# Patient Record
Sex: MALE | Race: OTHER | NOT HISPANIC OR LATINO
[De-identification: names, ages, dates, MRNs, and addresses within clinical notes are randomized per-mention and may not be internally consistent; named-entity substitution may affect disease eponyms.]

---

## 2017-01-02 ENCOUNTER — RESULT REVIEW (OUTPATIENT)
Age: 82
End: 2017-01-02

## 2017-01-03 ENCOUNTER — APPOINTMENT (OUTPATIENT)
Dept: INFUSION THERAPY | Facility: HOSPITAL | Age: 82
End: 2017-01-03

## 2017-01-03 ENCOUNTER — OUTPATIENT (OUTPATIENT)
Dept: OUTPATIENT SERVICES | Facility: HOSPITAL | Age: 82
LOS: 1 days | Discharge: HOME | End: 2017-01-03

## 2017-01-03 ENCOUNTER — OUTPATIENT (OUTPATIENT)
Dept: OUTPATIENT SERVICES | Facility: HOSPITAL | Age: 82
LOS: 1 days | End: 2017-01-03
Payer: MEDICARE

## 2017-01-03 DIAGNOSIS — C90.00 MULTIPLE MYELOMA NOT HAVING ACHIEVED REMISSION: ICD-10-CM

## 2017-01-03 LAB
BASOPHILS # BLD AUTO: 0 K/UL — SIGNIFICANT CHANGE UP (ref 0–0.2)
BLD GP AB SCN SERPL QL: NEGATIVE — SIGNIFICANT CHANGE UP
EOSINOPHIL # BLD AUTO: 0.1 K/UL — SIGNIFICANT CHANGE UP (ref 0–0.5)
EOSINOPHIL NFR BLD AUTO: 2 % — SIGNIFICANT CHANGE UP (ref 0–6)
HCT VFR BLD CALC: 21 % — CRITICAL LOW (ref 39–50)
HGB BLD-MCNC: 7.2 G/DL — LOW (ref 13–17)
LYMPHOCYTES # BLD AUTO: 0.6 K/UL — LOW (ref 1–3.3)
LYMPHOCYTES # BLD AUTO: 76 % — HIGH (ref 13–44)
MCHC RBC-ENTMCNC: 29.8 PG — SIGNIFICANT CHANGE UP (ref 27–34)
MCHC RBC-ENTMCNC: 34.6 GM/DL — SIGNIFICANT CHANGE UP (ref 32–36)
MCV RBC AUTO: 86.1 FL — SIGNIFICANT CHANGE UP (ref 80–100)
MONOCYTES # BLD AUTO: 0 K/UL — SIGNIFICANT CHANGE UP (ref 0–0.9)
MONOCYTES NFR BLD AUTO: 2 % — SIGNIFICANT CHANGE UP (ref 2–14)
NEUTROPHILS # BLD AUTO: 0.1 K/UL — LOW (ref 1.8–7.4)
NEUTROPHILS NFR BLD AUTO: 18 % — LOW (ref 43–77)
NEUTS BAND # BLD: 2 % — SIGNIFICANT CHANGE UP (ref 0–8)
PLAT MORPH BLD: NORMAL — SIGNIFICANT CHANGE UP
PLATELET # BLD AUTO: 6 K/UL — CRITICAL LOW (ref 150–400)
PLATELET # BLD MANUAL: 28 K/UL — LOW (ref 150–400)
RBC # BLD: 2.44 M/UL — LOW (ref 4.2–5.8)
RBC # FLD: 12 % — SIGNIFICANT CHANGE UP (ref 10.3–14.5)
RBC BLD AUTO: SIGNIFICANT CHANGE UP
RH IG SCN BLD-IMP: POSITIVE — SIGNIFICANT CHANGE UP
WBC # BLD: 0.8 K/UL — CRITICAL LOW (ref 3.8–10.5)
WBC # FLD AUTO: 0.8 K/UL — CRITICAL LOW (ref 3.8–10.5)

## 2017-01-04 ENCOUNTER — RESULT REVIEW (OUTPATIENT)
Age: 82
End: 2017-01-04

## 2017-01-04 DIAGNOSIS — Z51.89 ENCOUNTER FOR OTHER SPECIFIED AFTERCARE: ICD-10-CM

## 2017-01-04 DIAGNOSIS — R11.2 NAUSEA WITH VOMITING, UNSPECIFIED: ICD-10-CM

## 2017-01-05 ENCOUNTER — APPOINTMENT (OUTPATIENT)
Dept: INFUSION THERAPY | Facility: HOSPITAL | Age: 82
End: 2017-01-05

## 2017-01-05 LAB
BASOPHILS # BLD AUTO: 0 K/UL — SIGNIFICANT CHANGE UP (ref 0–0.2)
EOSINOPHIL # BLD AUTO: 0 K/UL — SIGNIFICANT CHANGE UP (ref 0–0.5)
EOSINOPHIL NFR BLD AUTO: 2 % — SIGNIFICANT CHANGE UP (ref 0–6)
HCT VFR BLD CALC: 21 % — CRITICAL LOW (ref 39–50)
HGB BLD-MCNC: 7.1 G/DL — LOW (ref 13–17)
LYMPHOCYTES # BLD AUTO: 0.6 K/UL — LOW (ref 1–3.3)
LYMPHOCYTES # BLD AUTO: 82 % — HIGH (ref 13–44)
MCHC RBC-ENTMCNC: 29.1 PG — SIGNIFICANT CHANGE UP (ref 27–34)
MCHC RBC-ENTMCNC: 33.8 GM/DL — SIGNIFICANT CHANGE UP (ref 32–36)
MCV RBC AUTO: 86.2 FL — SIGNIFICANT CHANGE UP (ref 80–100)
MONOCYTES # BLD AUTO: 0 K/UL — SIGNIFICANT CHANGE UP (ref 0–0.9)
NEUTROPHILS # BLD AUTO: 0.2 K/UL — LOW (ref 1.8–7.4)
NEUTROPHILS NFR BLD AUTO: 16 % — LOW (ref 43–77)
PLAT MORPH BLD: NORMAL — SIGNIFICANT CHANGE UP
PLATELET # BLD AUTO: 14 K/UL — CRITICAL LOW (ref 150–400)
RBC # BLD: 2.43 M/UL — LOW (ref 4.2–5.8)
RBC # FLD: 12 % — SIGNIFICANT CHANGE UP (ref 10.3–14.5)
RBC BLD AUTO: SIGNIFICANT CHANGE UP
WBC # BLD: 0.8 K/UL — CRITICAL LOW (ref 3.8–10.5)
WBC # FLD AUTO: 0.8 K/UL — CRITICAL LOW (ref 3.8–10.5)

## 2017-01-06 ENCOUNTER — RESULT REVIEW (OUTPATIENT)
Age: 82
End: 2017-01-06

## 2017-01-07 ENCOUNTER — APPOINTMENT (OUTPATIENT)
Dept: INFUSION THERAPY | Facility: HOSPITAL | Age: 82
End: 2017-01-07

## 2017-01-07 LAB
BASOPHILS # BLD AUTO: 0 K/UL — SIGNIFICANT CHANGE UP (ref 0–0.2)
BLD GP AB SCN SERPL QL: NEGATIVE — SIGNIFICANT CHANGE UP
EOSINOPHIL # BLD AUTO: 0 K/UL — SIGNIFICANT CHANGE UP (ref 0–0.5)
EOSINOPHIL NFR BLD AUTO: 1 % — SIGNIFICANT CHANGE UP (ref 0–6)
HCT VFR BLD CALC: 24.3 % — LOW (ref 39–50)
HGB BLD-MCNC: 8.3 G/DL — LOW (ref 13–17)
LYMPHOCYTES # BLD AUTO: 0.7 K/UL — LOW (ref 1–3.3)
LYMPHOCYTES # BLD AUTO: 73 % — HIGH (ref 13–44)
MCHC RBC-ENTMCNC: 29.5 PG — SIGNIFICANT CHANGE UP (ref 27–34)
MCHC RBC-ENTMCNC: 34 GM/DL — SIGNIFICANT CHANGE UP (ref 32–36)
MCV RBC AUTO: 86.7 FL — SIGNIFICANT CHANGE UP (ref 80–100)
MONOCYTES # BLD AUTO: 0 K/UL — SIGNIFICANT CHANGE UP (ref 0–0.9)
MONOCYTES NFR BLD AUTO: 3 % — SIGNIFICANT CHANGE UP (ref 2–14)
NEUTROPHILS # BLD AUTO: 0.2 K/UL — LOW (ref 1.8–7.4)
NEUTROPHILS NFR BLD AUTO: 23 % — LOW (ref 43–77)
PLAT MORPH BLD: NORMAL — SIGNIFICANT CHANGE UP
PLATELET # BLD AUTO: 7 K/UL — CRITICAL LOW (ref 150–400)
PLATELET # BLD MANUAL: 18 K/UL — CRITICAL LOW (ref 150–400)
RBC # BLD: 2.81 M/UL — LOW (ref 4.2–5.8)
RBC # FLD: 12.2 % — SIGNIFICANT CHANGE UP (ref 10.3–14.5)
RBC BLD AUTO: SIGNIFICANT CHANGE UP
RH IG SCN BLD-IMP: POSITIVE — SIGNIFICANT CHANGE UP
WBC # BLD: 0.9 K/UL — CRITICAL LOW (ref 3.8–10.5)
WBC # FLD AUTO: 0.9 K/UL — CRITICAL LOW (ref 3.8–10.5)

## 2017-01-08 ENCOUNTER — RESULT REVIEW (OUTPATIENT)
Age: 82
End: 2017-01-08

## 2017-01-09 ENCOUNTER — APPOINTMENT (OUTPATIENT)
Dept: INFUSION THERAPY | Facility: HOSPITAL | Age: 82
End: 2017-01-09

## 2017-01-09 LAB
EOSINOPHIL # BLD AUTO: 0.1 K/UL — SIGNIFICANT CHANGE UP (ref 0–0.5)
EOSINOPHIL NFR BLD AUTO: 1 % — SIGNIFICANT CHANGE UP (ref 0–6)
HCT VFR BLD CALC: 23.6 % — LOW (ref 39–50)
HGB BLD-MCNC: 8.3 G/DL — LOW (ref 13–17)
LYMPHOCYTES # BLD AUTO: 0.7 K/UL — LOW (ref 1–3.3)
LYMPHOCYTES # BLD AUTO: 67 % — HIGH (ref 13–44)
MCHC RBC-ENTMCNC: 30.3 PG — SIGNIFICANT CHANGE UP (ref 27–34)
MCHC RBC-ENTMCNC: 35 GM/DL — SIGNIFICANT CHANGE UP (ref 32–36)
MCV RBC AUTO: 86.5 FL — SIGNIFICANT CHANGE UP (ref 80–100)
MONOCYTES # BLD AUTO: 0 K/UL — SIGNIFICANT CHANGE UP (ref 0–0.9)
MONOCYTES NFR BLD AUTO: 2 % — SIGNIFICANT CHANGE UP (ref 2–14)
NEUTROPHILS # BLD AUTO: 0.2 K/UL — LOW (ref 1.8–7.4)
NEUTROPHILS NFR BLD AUTO: 30 % — LOW (ref 43–77)
PLAT MORPH BLD: NORMAL — SIGNIFICANT CHANGE UP
PLATELET # BLD AUTO: 5 K/UL — CRITICAL LOW (ref 150–400)
PLATELET # BLD MANUAL: 4 K/UL — CRITICAL LOW (ref 150–400)
RBC # BLD: 2.73 M/UL — LOW (ref 4.2–5.8)
RBC # FLD: 12 % — SIGNIFICANT CHANGE UP (ref 10.3–14.5)
RBC BLD AUTO: SIGNIFICANT CHANGE UP
WBC # BLD: 1 K/UL — CRITICAL LOW (ref 3.8–10.5)
WBC # FLD AUTO: 1 K/UL — CRITICAL LOW (ref 3.8–10.5)

## 2017-01-10 ENCOUNTER — RESULT REVIEW (OUTPATIENT)
Age: 82
End: 2017-01-10

## 2017-01-11 ENCOUNTER — APPOINTMENT (OUTPATIENT)
Dept: INFUSION THERAPY | Facility: HOSPITAL | Age: 82
End: 2017-01-11

## 2017-01-11 LAB
ANISOCYTOSIS BLD QL: SLIGHT — SIGNIFICANT CHANGE UP
BASOPHILS # BLD AUTO: 0 K/UL — SIGNIFICANT CHANGE UP (ref 0–0.2)
BLD GP AB SCN SERPL QL: NEGATIVE — SIGNIFICANT CHANGE UP
DACRYOCYTES BLD QL SMEAR: SLIGHT — SIGNIFICANT CHANGE UP
ELLIPTOCYTES BLD QL SMEAR: SLIGHT — SIGNIFICANT CHANGE UP
EOSINOPHIL # BLD AUTO: 0 K/UL — SIGNIFICANT CHANGE UP (ref 0–0.5)
EOSINOPHIL NFR BLD AUTO: 4 % — SIGNIFICANT CHANGE UP (ref 0–6)
HCT VFR BLD CALC: 22.3 % — LOW (ref 39–50)
HGB BLD-MCNC: 7.6 G/DL — LOW (ref 13–17)
HYPOCHROMIA BLD QL: SLIGHT — SIGNIFICANT CHANGE UP
LYMPHOCYTES # BLD AUTO: 0.6 K/UL — LOW (ref 1–3.3)
LYMPHOCYTES # BLD AUTO: 76 % — HIGH (ref 13–44)
MACROCYTES BLD QL: SLIGHT — SIGNIFICANT CHANGE UP
MCHC RBC-ENTMCNC: 29.5 PG — SIGNIFICANT CHANGE UP (ref 27–34)
MCHC RBC-ENTMCNC: 34 GM/DL — SIGNIFICANT CHANGE UP (ref 32–36)
MCV RBC AUTO: 86.6 FL — SIGNIFICANT CHANGE UP (ref 80–100)
MICROCYTES BLD QL: SLIGHT — SIGNIFICANT CHANGE UP
MONOCYTES # BLD AUTO: 0 K/UL — SIGNIFICANT CHANGE UP (ref 0–0.9)
MONOCYTES NFR BLD AUTO: 2 % — SIGNIFICANT CHANGE UP (ref 2–14)
NEUTROPHILS # BLD AUTO: 0.2 K/UL — LOW (ref 1.8–7.4)
NEUTROPHILS NFR BLD AUTO: 18 % — LOW (ref 43–77)
PLAT MORPH BLD: NORMAL — SIGNIFICANT CHANGE UP
PLATELET # BLD AUTO: 4 K/UL — CRITICAL LOW (ref 150–400)
POIKILOCYTOSIS BLD QL AUTO: SLIGHT — SIGNIFICANT CHANGE UP
POLYCHROMASIA BLD QL SMEAR: SLIGHT — SIGNIFICANT CHANGE UP
RBC # BLD: 2.58 M/UL — LOW (ref 4.2–5.8)
RBC # FLD: 12.1 % — SIGNIFICANT CHANGE UP (ref 10.3–14.5)
RBC BLD AUTO: ABNORMAL
RH IG SCN BLD-IMP: POSITIVE — SIGNIFICANT CHANGE UP
SCHISTOCYTES BLD QL AUTO: SLIGHT — SIGNIFICANT CHANGE UP
WBC # BLD: 0.8 K/UL — CRITICAL LOW (ref 3.8–10.5)
WBC # FLD AUTO: 0.8 K/UL — CRITICAL LOW (ref 3.8–10.5)

## 2017-01-12 ENCOUNTER — RESULT REVIEW (OUTPATIENT)
Age: 82
End: 2017-01-12

## 2017-01-13 ENCOUNTER — RESULT REVIEW (OUTPATIENT)
Age: 82
End: 2017-01-13

## 2017-01-13 ENCOUNTER — APPOINTMENT (OUTPATIENT)
Dept: INFUSION THERAPY | Facility: HOSPITAL | Age: 82
End: 2017-01-13

## 2017-01-13 LAB
ANISOCYTOSIS BLD QL: SLIGHT — SIGNIFICANT CHANGE UP
ELLIPTOCYTES BLD QL SMEAR: SLIGHT — SIGNIFICANT CHANGE UP
EOSINOPHIL NFR BLD AUTO: 8 % — HIGH (ref 0–6)
HCT VFR BLD CALC: 20.8 % — CRITICAL LOW (ref 39–50)
HGB BLD-MCNC: 7.3 G/DL — LOW (ref 13–17)
HYPOCHROMIA BLD QL: SLIGHT — SIGNIFICANT CHANGE UP
LYMPHOCYTES # BLD AUTO: 0.7 K/UL — LOW (ref 1–3.3)
LYMPHOCYTES # BLD AUTO: 80 % — HIGH (ref 13–44)
MCHC RBC-ENTMCNC: 30.1 PG — SIGNIFICANT CHANGE UP (ref 27–34)
MCHC RBC-ENTMCNC: 35 GM/DL — SIGNIFICANT CHANGE UP (ref 32–36)
MCV RBC AUTO: 85.9 FL — SIGNIFICANT CHANGE UP (ref 80–100)
MONOCYTES NFR BLD AUTO: 2 % — SIGNIFICANT CHANGE UP (ref 2–14)
NEUTROPHILS # BLD AUTO: 0.2 K/UL — LOW (ref 1.8–7.4)
NEUTROPHILS NFR BLD AUTO: 10 % — LOW (ref 43–77)
PLAT MORPH BLD: NORMAL — SIGNIFICANT CHANGE UP
PLATELET # BLD AUTO: 5 K/UL — CRITICAL LOW (ref 150–400)
PLATELET # BLD MANUAL: 4 K/UL — CRITICAL LOW (ref 150–400)
POIKILOCYTOSIS BLD QL AUTO: SLIGHT — SIGNIFICANT CHANGE UP
POLYCHROMASIA BLD QL SMEAR: SLIGHT — SIGNIFICANT CHANGE UP
RBC # BLD: 2.42 M/UL — LOW (ref 4.2–5.8)
RBC # FLD: 12 % — SIGNIFICANT CHANGE UP (ref 10.3–14.5)
RBC BLD AUTO: ABNORMAL
SCHISTOCYTES BLD QL AUTO: SLIGHT — SIGNIFICANT CHANGE UP
WBC # BLD: 0.9 K/UL — CRITICAL LOW (ref 3.8–10.5)
WBC # FLD AUTO: 0.9 K/UL — CRITICAL LOW (ref 3.8–10.5)

## 2017-01-14 ENCOUNTER — APPOINTMENT (OUTPATIENT)
Dept: INFUSION THERAPY | Facility: HOSPITAL | Age: 82
End: 2017-01-14

## 2017-01-14 LAB
ANISOCYTOSIS BLD QL: SLIGHT — SIGNIFICANT CHANGE UP
BASOPHILS # BLD AUTO: 0 K/UL — SIGNIFICANT CHANGE UP (ref 0–0.2)
DACRYOCYTES BLD QL SMEAR: SLIGHT — SIGNIFICANT CHANGE UP
ELLIPTOCYTES BLD QL SMEAR: SLIGHT — SIGNIFICANT CHANGE UP
EOSINOPHIL # BLD AUTO: 0.1 K/UL — SIGNIFICANT CHANGE UP (ref 0–0.5)
EOSINOPHIL NFR BLD AUTO: 6 % — SIGNIFICANT CHANGE UP (ref 0–6)
HCT VFR BLD CALC: 25.8 % — LOW (ref 39–50)
HGB BLD-MCNC: 9.2 G/DL — LOW (ref 13–17)
HYPOCHROMIA BLD QL: SLIGHT — SIGNIFICANT CHANGE UP
LYMPHOCYTES # BLD AUTO: 0.7 K/UL — LOW (ref 1–3.3)
LYMPHOCYTES # BLD AUTO: 72 % — HIGH (ref 13–44)
MACROCYTES BLD QL: SLIGHT — SIGNIFICANT CHANGE UP
MCHC RBC-ENTMCNC: 30.3 PG — SIGNIFICANT CHANGE UP (ref 27–34)
MCHC RBC-ENTMCNC: 35.5 GM/DL — SIGNIFICANT CHANGE UP (ref 32–36)
MCV RBC AUTO: 85.3 FL — SIGNIFICANT CHANGE UP (ref 80–100)
MICROCYTES BLD QL: SLIGHT — SIGNIFICANT CHANGE UP
MONOCYTES # BLD AUTO: 0 K/UL — SIGNIFICANT CHANGE UP (ref 0–0.9)
MONOCYTES NFR BLD AUTO: 2 % — SIGNIFICANT CHANGE UP (ref 2–14)
NEUTROPHILS # BLD AUTO: 0.2 K/UL — LOW (ref 1.8–7.4)
NEUTROPHILS NFR BLD AUTO: 19 % — LOW (ref 43–77)
NEUTS BAND # BLD: 1 % — SIGNIFICANT CHANGE UP (ref 0–8)
PLAT MORPH BLD: NORMAL — SIGNIFICANT CHANGE UP
PLATELET # BLD AUTO: 6 K/UL — CRITICAL LOW (ref 150–400)
PLATELET # BLD MANUAL: 4 K/UL — CRITICAL LOW (ref 150–400)
POIKILOCYTOSIS BLD QL AUTO: SLIGHT — SIGNIFICANT CHANGE UP
POLYCHROMASIA BLD QL SMEAR: SLIGHT — SIGNIFICANT CHANGE UP
RBC # BLD: 3.02 M/UL — LOW (ref 4.2–5.8)
RBC # FLD: 12 % — SIGNIFICANT CHANGE UP (ref 10.3–14.5)
RBC BLD AUTO: ABNORMAL
SCHISTOCYTES BLD QL AUTO: SLIGHT — SIGNIFICANT CHANGE UP
WBC # BLD: 1 K/UL — CRITICAL LOW (ref 3.8–10.5)
WBC # FLD AUTO: 1 K/UL — CRITICAL LOW (ref 3.8–10.5)

## 2017-01-16 ENCOUNTER — RESULT REVIEW (OUTPATIENT)
Age: 82
End: 2017-01-16

## 2017-01-17 ENCOUNTER — LABORATORY RESULT (OUTPATIENT)
Age: 82
End: 2017-01-17

## 2017-01-17 ENCOUNTER — RESULT REVIEW (OUTPATIENT)
Age: 82
End: 2017-01-17

## 2017-01-17 ENCOUNTER — APPOINTMENT (OUTPATIENT)
Dept: INFUSION THERAPY | Facility: HOSPITAL | Age: 82
End: 2017-01-17

## 2017-01-17 LAB
ANISOCYTOSIS BLD QL: SLIGHT — SIGNIFICANT CHANGE UP
BASOPHILS NFR BLD AUTO: 0 % — SIGNIFICANT CHANGE UP (ref 0–2)
BLASTS # FLD: 1 % — HIGH (ref 0–0)
ELLIPTOCYTES BLD QL SMEAR: SLIGHT — SIGNIFICANT CHANGE UP
EOSINOPHIL # BLD AUTO: 0.1 K/UL — SIGNIFICANT CHANGE UP (ref 0–0.5)
EOSINOPHIL NFR BLD AUTO: 4 % — SIGNIFICANT CHANGE UP (ref 0–6)
HCT VFR BLD CALC: 25.5 % — LOW (ref 39–50)
HGB BLD-MCNC: 9 G/DL — LOW (ref 13–17)
LYMPHOCYTES # BLD AUTO: 0.9 K/UL — LOW (ref 1–3.3)
LYMPHOCYTES # BLD AUTO: 80 % — HIGH (ref 13–44)
MCHC RBC-ENTMCNC: 30 PG — SIGNIFICANT CHANGE UP (ref 27–34)
MCHC RBC-ENTMCNC: 35.3 GM/DL — SIGNIFICANT CHANGE UP (ref 32–36)
MCV RBC AUTO: 85 FL — SIGNIFICANT CHANGE UP (ref 80–100)
MONOCYTES NFR BLD AUTO: 1 % — LOW (ref 2–14)
NEUTROPHILS # BLD AUTO: 0.1 K/UL — LOW (ref 1.8–7.4)
NEUTROPHILS NFR BLD AUTO: 13 % — LOW (ref 43–77)
NEUTS BAND # BLD: 1 % — SIGNIFICANT CHANGE UP (ref 0–8)
PLAT MORPH BLD: NORMAL — SIGNIFICANT CHANGE UP
PLATELET # BLD AUTO: 6 K/UL — CRITICAL LOW (ref 150–400)
POIKILOCYTOSIS BLD QL AUTO: SLIGHT — SIGNIFICANT CHANGE UP
RBC # BLD: 3 M/UL — LOW (ref 4.2–5.8)
RBC # FLD: 11.7 % — SIGNIFICANT CHANGE UP (ref 10.3–14.5)
RBC BLD AUTO: ABNORMAL
SCHISTOCYTES BLD QL AUTO: SLIGHT — SIGNIFICANT CHANGE UP
WBC # BLD: 1.1 K/UL — LOW (ref 3.8–10.5)
WBC # FLD AUTO: 1.1 K/UL — LOW (ref 3.8–10.5)

## 2017-01-18 ENCOUNTER — RESULT REVIEW (OUTPATIENT)
Age: 82
End: 2017-01-18

## 2017-01-18 ENCOUNTER — APPOINTMENT (OUTPATIENT)
Dept: INFUSION THERAPY | Facility: HOSPITAL | Age: 82
End: 2017-01-18

## 2017-01-18 ENCOUNTER — APPOINTMENT (OUTPATIENT)
Dept: HEMATOLOGY ONCOLOGY | Facility: CLINIC | Age: 82
End: 2017-01-18

## 2017-01-18 VITALS
TEMPERATURE: 98 F | SYSTOLIC BLOOD PRESSURE: 118 MMHG | HEART RATE: 70 BPM | DIASTOLIC BLOOD PRESSURE: 76 MMHG | RESPIRATION RATE: 16 BRPM

## 2017-01-18 DIAGNOSIS — Z51.11 ENCOUNTER FOR ANTINEOPLASTIC CHEMOTHERAPY: ICD-10-CM

## 2017-01-18 DIAGNOSIS — D64.89 OTHER SPECIFIED ANEMIAS: ICD-10-CM

## 2017-01-18 LAB
BASOPHILS # BLD AUTO: 0 K/UL — SIGNIFICANT CHANGE UP (ref 0–0.2)
BASOPHILS NFR BLD AUTO: 2 % — SIGNIFICANT CHANGE UP (ref 0–2)
BLD GP AB SCN SERPL QL: NEGATIVE — SIGNIFICANT CHANGE UP
EOSINOPHIL # BLD AUTO: 0 K/UL — SIGNIFICANT CHANGE UP (ref 0–0.5)
HCT VFR BLD CALC: 23.6 % — LOW (ref 39–50)
HGB BLD-MCNC: 8.3 G/DL — LOW (ref 13–17)
LYMPHOCYTES # BLD AUTO: 0.6 K/UL — LOW (ref 1–3.3)
LYMPHOCYTES # BLD AUTO: 70 % — HIGH (ref 13–44)
MCHC RBC-ENTMCNC: 30.1 PG — SIGNIFICANT CHANGE UP (ref 27–34)
MCHC RBC-ENTMCNC: 35.2 GM/DL — SIGNIFICANT CHANGE UP (ref 32–36)
MCV RBC AUTO: 85.7 FL — SIGNIFICANT CHANGE UP (ref 80–100)
MONOCYTES # BLD AUTO: 0 K/UL — SIGNIFICANT CHANGE UP (ref 0–0.9)
MONOCYTES NFR BLD AUTO: 1 % — LOW (ref 2–14)
NEUTROPHILS # BLD AUTO: 0.2 K/UL — LOW (ref 1.8–7.4)
NEUTROPHILS NFR BLD AUTO: 27 % — LOW (ref 43–77)
PLAT MORPH BLD: NORMAL — SIGNIFICANT CHANGE UP
PLATELET # BLD AUTO: 5 K/UL — CRITICAL LOW (ref 150–400)
PLATELET # BLD MANUAL: 16 K/UL — CRITICAL LOW (ref 150–400)
RBC # BLD: 2.76 M/UL — LOW (ref 4.2–5.8)
RBC # FLD: 11.6 % — SIGNIFICANT CHANGE UP (ref 10.3–14.5)
RBC BLD AUTO: SIGNIFICANT CHANGE UP
RH IG SCN BLD-IMP: POSITIVE — SIGNIFICANT CHANGE UP
WBC # BLD: 0.9 K/UL — CRITICAL LOW (ref 3.8–10.5)
WBC # FLD AUTO: 0.9 K/UL — CRITICAL LOW (ref 3.8–10.5)

## 2017-01-19 ENCOUNTER — RESULT REVIEW (OUTPATIENT)
Age: 82
End: 2017-01-19

## 2017-01-19 ENCOUNTER — APPOINTMENT (OUTPATIENT)
Dept: INFUSION THERAPY | Facility: HOSPITAL | Age: 82
End: 2017-01-19

## 2017-01-19 LAB
EOSINOPHIL NFR BLD AUTO: 1 % — SIGNIFICANT CHANGE UP (ref 0–6)
HCT VFR BLD CALC: 23.3 % — LOW (ref 39–50)
HGB BLD-MCNC: 8.2 G/DL — LOW (ref 13–17)
LYMPHOCYTES # BLD AUTO: 76 % — HIGH (ref 13–44)
LYMPHOCYTES # BLD AUTO: LOW K/UL (ref 1–3.3)
MCHC RBC-ENTMCNC: 30.1 PG — SIGNIFICANT CHANGE UP (ref 27–34)
MCHC RBC-ENTMCNC: 35.1 GM/DL — SIGNIFICANT CHANGE UP (ref 32–36)
MCV RBC AUTO: 85.7 FL — SIGNIFICANT CHANGE UP (ref 80–100)
MONOCYTES NFR BLD AUTO: 10 % — SIGNIFICANT CHANGE UP (ref 2–14)
NEUTROPHILS # BLD AUTO: 0.2 K/UL — LOW (ref 1.8–7.4)
NEUTROPHILS NFR BLD AUTO: 13 % — LOW (ref 43–77)
PLAT MORPH BLD: NORMAL — SIGNIFICANT CHANGE UP
PLATELET # BLD AUTO: 15 K/UL — CRITICAL LOW (ref 150–400)
PLATELET # BLD MANUAL: 30 K/UL — LOW (ref 150–400)
RBC # BLD: 2.72 M/UL — LOW (ref 4.2–5.8)
RBC # FLD: 11.8 % — SIGNIFICANT CHANGE UP (ref 10.3–14.5)
RBC BLD AUTO: SIGNIFICANT CHANGE UP
WBC # BLD: 1 K/UL — CRITICAL LOW (ref 3.8–10.5)
WBC # FLD AUTO: 1 K/UL — CRITICAL LOW (ref 3.8–10.5)

## 2017-01-20 ENCOUNTER — APPOINTMENT (OUTPATIENT)
Dept: INFUSION THERAPY | Facility: HOSPITAL | Age: 82
End: 2017-01-20

## 2017-01-20 ENCOUNTER — RESULT REVIEW (OUTPATIENT)
Age: 82
End: 2017-01-20

## 2017-01-20 LAB
BASOPHILS # BLD AUTO: 0 K/UL — SIGNIFICANT CHANGE UP (ref 0–0.2)
EOSINOPHIL # BLD AUTO: 0.1 K/UL — SIGNIFICANT CHANGE UP (ref 0–0.5)
EOSINOPHIL NFR BLD AUTO: 4 % — SIGNIFICANT CHANGE UP (ref 0–6)
HCT VFR BLD CALC: 22.5 % — LOW (ref 39–50)
HGB BLD-MCNC: 7.8 G/DL — LOW (ref 13–17)
LYMPHOCYTES # BLD AUTO: 0.7 K/UL — LOW (ref 1–3.3)
LYMPHOCYTES # BLD AUTO: 76 % — HIGH (ref 13–44)
MCHC RBC-ENTMCNC: 29.8 PG — SIGNIFICANT CHANGE UP (ref 27–34)
MCHC RBC-ENTMCNC: 34.4 GM/DL — SIGNIFICANT CHANGE UP (ref 32–36)
MCV RBC AUTO: 86.4 FL — SIGNIFICANT CHANGE UP (ref 80–100)
MONOCYTES # BLD AUTO: 0 K/UL — SIGNIFICANT CHANGE UP (ref 0–0.9)
MONOCYTES NFR BLD AUTO: 7 % — SIGNIFICANT CHANGE UP (ref 2–14)
NEUTROPHILS # BLD AUTO: 0.1 K/UL — LOW (ref 1.8–7.4)
NEUTROPHILS NFR BLD AUTO: 13 % — LOW (ref 43–77)
PLAT MORPH BLD: NORMAL — SIGNIFICANT CHANGE UP
PLATELET # BLD AUTO: 21 K/UL — LOW (ref 150–400)
RBC # BLD: 2.61 M/UL — LOW (ref 4.2–5.8)
RBC # FLD: 11.7 % — SIGNIFICANT CHANGE UP (ref 10.3–14.5)
RBC BLD AUTO: SIGNIFICANT CHANGE UP
WBC # BLD: 0.9 K/UL — CRITICAL LOW (ref 3.8–10.5)
WBC # FLD AUTO: 0.9 K/UL — CRITICAL LOW (ref 3.8–10.5)

## 2017-01-21 ENCOUNTER — APPOINTMENT (OUTPATIENT)
Dept: INFUSION THERAPY | Facility: HOSPITAL | Age: 82
End: 2017-01-21

## 2017-01-21 LAB
BASOPHILS # BLD AUTO: 0 K/UL — SIGNIFICANT CHANGE UP (ref 0–0.2)
EOSINOPHIL # BLD AUTO: 0 K/UL — SIGNIFICANT CHANGE UP (ref 0–0.5)
EOSINOPHIL NFR BLD AUTO: 6 % — SIGNIFICANT CHANGE UP (ref 0–6)
HCT VFR BLD CALC: 23.4 % — LOW (ref 39–50)
HGB BLD-MCNC: 8.2 G/DL — LOW (ref 13–17)
LYMPHOCYTES # BLD AUTO: 0.6 K/UL — LOW (ref 1–3.3)
LYMPHOCYTES # BLD AUTO: 76 % — HIGH (ref 13–44)
MCHC RBC-ENTMCNC: 29.9 PG — SIGNIFICANT CHANGE UP (ref 27–34)
MCHC RBC-ENTMCNC: 35 GM/DL — SIGNIFICANT CHANGE UP (ref 32–36)
MCV RBC AUTO: 85.4 FL — SIGNIFICANT CHANGE UP (ref 80–100)
MONOCYTES # BLD AUTO: 0 K/UL — SIGNIFICANT CHANGE UP (ref 0–0.9)
NEUTROPHILS # BLD AUTO: 0.1 K/UL — LOW (ref 1.8–7.4)
NEUTROPHILS NFR BLD AUTO: 18 % — LOW (ref 43–77)
PLAT MORPH BLD: NORMAL — SIGNIFICANT CHANGE UP
PLATELET # BLD AUTO: 18 K/UL — CRITICAL LOW (ref 150–400)
RBC # BLD: 2.74 M/UL — LOW (ref 4.2–5.8)
RBC # FLD: 11.7 % — SIGNIFICANT CHANGE UP (ref 10.3–14.5)
RBC BLD AUTO: SIGNIFICANT CHANGE UP
WBC # BLD: 0.8 K/UL — CRITICAL LOW (ref 3.8–10.5)
WBC # FLD AUTO: 0.8 K/UL — CRITICAL LOW (ref 3.8–10.5)

## 2017-01-22 ENCOUNTER — RESULT REVIEW (OUTPATIENT)
Age: 82
End: 2017-01-22

## 2017-01-23 ENCOUNTER — APPOINTMENT (OUTPATIENT)
Dept: INFUSION THERAPY | Facility: HOSPITAL | Age: 82
End: 2017-01-23

## 2017-01-23 ENCOUNTER — RESULT REVIEW (OUTPATIENT)
Age: 82
End: 2017-01-23

## 2017-01-23 LAB
BASOPHILS # BLD AUTO: 0 K/UL — SIGNIFICANT CHANGE UP (ref 0–0.2)
EOSINOPHIL # BLD AUTO: 0.1 K/UL — SIGNIFICANT CHANGE UP (ref 0–0.5)
EOSINOPHIL NFR BLD AUTO: 4 % — SIGNIFICANT CHANGE UP (ref 0–6)
HCT VFR BLD CALC: 26.1 % — LOW (ref 39–50)
HGB BLD-MCNC: 9.1 G/DL — LOW (ref 13–17)
LYMPHOCYTES # BLD AUTO: 0.7 K/UL — LOW (ref 1–3.3)
LYMPHOCYTES # BLD AUTO: 56 % — HIGH (ref 13–44)
MCHC RBC-ENTMCNC: 30.2 PG — SIGNIFICANT CHANGE UP (ref 27–34)
MCHC RBC-ENTMCNC: 35 GM/DL — SIGNIFICANT CHANGE UP (ref 32–36)
MCV RBC AUTO: 86.2 FL — SIGNIFICANT CHANGE UP (ref 80–100)
MONOCYTES # BLD AUTO: 0 K/UL — SIGNIFICANT CHANGE UP (ref 0–0.9)
NEUTROPHILS # BLD AUTO: 0.4 K/UL — LOW (ref 1.8–7.4)
NEUTROPHILS NFR BLD AUTO: 40 % — LOW (ref 43–77)
PLAT MORPH BLD: NORMAL — SIGNIFICANT CHANGE UP
PLATELET # BLD AUTO: 9 K/UL — CRITICAL LOW (ref 150–400)
PLATELET # BLD MANUAL: 20 K/UL — CRITICAL LOW (ref 150–400)
RBC # BLD: 3.03 M/UL — LOW (ref 4.2–5.8)
RBC # FLD: 12.6 % — SIGNIFICANT CHANGE UP (ref 10.3–14.5)
RBC BLD AUTO: SIGNIFICANT CHANGE UP
WBC # BLD: 1.2 K/UL — LOW (ref 3.8–10.5)
WBC # FLD AUTO: 1.2 K/UL — LOW (ref 3.8–10.5)

## 2017-01-24 ENCOUNTER — RESULT REVIEW (OUTPATIENT)
Age: 82
End: 2017-01-24

## 2017-01-24 ENCOUNTER — APPOINTMENT (OUTPATIENT)
Dept: INFUSION THERAPY | Facility: HOSPITAL | Age: 82
End: 2017-01-24

## 2017-01-24 LAB
HCT VFR BLD CALC: 25.2 % — LOW (ref 39–50)
HGB BLD-MCNC: 8.8 G/DL — LOW (ref 13–17)
LYMPHOCYTES # BLD AUTO: 0.6 K/UL — LOW (ref 1–3.3)
LYMPHOCYTES # BLD AUTO: 61 % — HIGH (ref 13–44)
MCHC RBC-ENTMCNC: 30.2 PG — SIGNIFICANT CHANGE UP (ref 27–34)
MCHC RBC-ENTMCNC: 35.1 GM/DL — SIGNIFICANT CHANGE UP (ref 32–36)
MCV RBC AUTO: 86.1 FL — SIGNIFICANT CHANGE UP (ref 80–100)
MONOCYTES # BLD AUTO: 0 K/UL — SIGNIFICANT CHANGE UP (ref 0–0.9)
MONOCYTES NFR BLD AUTO: 3 % — SIGNIFICANT CHANGE UP (ref 2–14)
NEUTROPHILS # BLD AUTO: 0.3 K/UL — LOW (ref 1.8–7.4)
NEUTROPHILS NFR BLD AUTO: 36 % — LOW (ref 43–77)
PLAT MORPH BLD: NORMAL — SIGNIFICANT CHANGE UP
PLATELET # BLD AUTO: 21 K/UL — LOW (ref 150–400)
RBC # BLD: 2.92 M/UL — LOW (ref 4.2–5.8)
RBC # FLD: 12.3 % — SIGNIFICANT CHANGE UP (ref 10.3–14.5)
RBC BLD AUTO: SIGNIFICANT CHANGE UP
WBC # BLD: 1 K/UL — CRITICAL LOW (ref 3.8–10.5)
WBC # FLD AUTO: 1 K/UL — CRITICAL LOW (ref 3.8–10.5)

## 2017-01-25 ENCOUNTER — APPOINTMENT (OUTPATIENT)
Dept: INFUSION THERAPY | Facility: HOSPITAL | Age: 82
End: 2017-01-25

## 2017-01-25 LAB
BASOPHILS # BLD AUTO: 0 K/UL — SIGNIFICANT CHANGE UP (ref 0–0.2)
EOSINOPHIL # BLD AUTO: 0 K/UL — SIGNIFICANT CHANGE UP (ref 0–0.5)
HCT VFR BLD CALC: 25.8 % — LOW (ref 39–50)
HGB BLD-MCNC: 8.9 G/DL — LOW (ref 13–17)
LYMPHOCYTES # BLD AUTO: 0.8 K/UL — LOW (ref 1–3.3)
LYMPHOCYTES # BLD AUTO: 78 % — HIGH (ref 13–44)
MCHC RBC-ENTMCNC: 29.6 PG — SIGNIFICANT CHANGE UP (ref 27–34)
MCHC RBC-ENTMCNC: 34.3 GM/DL — SIGNIFICANT CHANGE UP (ref 32–36)
MCV RBC AUTO: 86.3 FL — SIGNIFICANT CHANGE UP (ref 80–100)
MONOCYTES # BLD AUTO: 0 K/UL — SIGNIFICANT CHANGE UP (ref 0–0.9)
MONOCYTES NFR BLD AUTO: 6 % — SIGNIFICANT CHANGE UP (ref 2–14)
NEUTROPHILS # BLD AUTO: 0.2 K/UL — LOW (ref 1.8–7.4)
NEUTROPHILS NFR BLD AUTO: 16 % — LOW (ref 43–77)
PLAT MORPH BLD: NORMAL — SIGNIFICANT CHANGE UP
PLATELET # BLD AUTO: 15 K/UL — CRITICAL LOW (ref 150–400)
RBC # BLD: 2.99 M/UL — LOW (ref 4.2–5.8)
RBC # FLD: 12.4 % — SIGNIFICANT CHANGE UP (ref 10.3–14.5)
RBC BLD AUTO: SIGNIFICANT CHANGE UP
WBC # BLD: 1 K/UL — CRITICAL LOW (ref 3.8–10.5)
WBC # FLD AUTO: 1 K/UL — CRITICAL LOW (ref 3.8–10.5)

## 2017-01-26 ENCOUNTER — RESULT REVIEW (OUTPATIENT)
Age: 82
End: 2017-01-26

## 2017-01-26 ENCOUNTER — MEDICATION RENEWAL (OUTPATIENT)
Age: 82
End: 2017-01-26

## 2017-01-26 DIAGNOSIS — K59.00 CONSTIPATION, UNSPECIFIED: ICD-10-CM

## 2017-01-26 RX ORDER — LACTULOSE 10 G/15ML
10 SOLUTION ORAL
Qty: 500 | Refills: 2 | Status: ACTIVE | COMMUNITY
Start: 2017-01-26 | End: 1900-01-01

## 2017-01-27 ENCOUNTER — APPOINTMENT (OUTPATIENT)
Dept: INFUSION THERAPY | Facility: HOSPITAL | Age: 82
End: 2017-01-27

## 2017-01-27 LAB
BASOPHILS # BLD AUTO: 0 K/UL — SIGNIFICANT CHANGE UP (ref 0–0.2)
BASOPHILS NFR BLD AUTO: 1 % — SIGNIFICANT CHANGE UP (ref 0–2)
EOSINOPHIL # BLD AUTO: 0.1 K/UL — SIGNIFICANT CHANGE UP (ref 0–0.5)
EOSINOPHIL NFR BLD AUTO: 4 % — SIGNIFICANT CHANGE UP (ref 0–6)
HCT VFR BLD CALC: 25.6 % — LOW (ref 39–50)
HGB BLD-MCNC: 8.8 G/DL — LOW (ref 13–17)
LYMPHOCYTES # BLD AUTO: 0.7 K/UL — LOW (ref 1–3.3)
LYMPHOCYTES # BLD AUTO: 75 % — HIGH (ref 13–44)
MCHC RBC-ENTMCNC: 29.2 PG — SIGNIFICANT CHANGE UP (ref 27–34)
MCHC RBC-ENTMCNC: 34.2 GM/DL — SIGNIFICANT CHANGE UP (ref 32–36)
MCV RBC AUTO: 85.3 FL — SIGNIFICANT CHANGE UP (ref 80–100)
MONOCYTES # BLD AUTO: 0 K/UL — SIGNIFICANT CHANGE UP (ref 0–0.9)
NEUTROPHILS # BLD AUTO: 0.2 K/UL — LOW (ref 1.8–7.4)
NEUTROPHILS NFR BLD AUTO: 20 % — LOW (ref 43–77)
PLAT MORPH BLD: NORMAL — SIGNIFICANT CHANGE UP
PLATELET # BLD AUTO: 9 K/UL — CRITICAL LOW (ref 150–400)
RBC # BLD: 3.01 M/UL — LOW (ref 4.2–5.8)
RBC # FLD: 12.2 % — SIGNIFICANT CHANGE UP (ref 10.3–14.5)
RBC BLD AUTO: SIGNIFICANT CHANGE UP
WBC # BLD: 1 K/UL — CRITICAL LOW (ref 3.8–10.5)
WBC # FLD AUTO: 1 K/UL — CRITICAL LOW (ref 3.8–10.5)

## 2017-01-29 ENCOUNTER — RESULT REVIEW (OUTPATIENT)
Age: 82
End: 2017-01-29

## 2017-01-30 ENCOUNTER — APPOINTMENT (OUTPATIENT)
Dept: INFUSION THERAPY | Facility: HOSPITAL | Age: 82
End: 2017-01-30

## 2017-01-30 ENCOUNTER — OUTPATIENT (OUTPATIENT)
Dept: OUTPATIENT SERVICES | Facility: HOSPITAL | Age: 82
LOS: 1 days | Discharge: HOME | End: 2017-01-30

## 2017-01-30 DIAGNOSIS — C95.00 ACUTE LEUKEMIA OF UNSPECIFIED CELL TYPE NOT HAVING ACHIEVED REMISSION: ICD-10-CM

## 2017-01-30 LAB
BASOPHILS # BLD AUTO: 0 K/UL — SIGNIFICANT CHANGE UP (ref 0–0.2)
EOSINOPHIL # BLD AUTO: 0 K/UL — SIGNIFICANT CHANGE UP (ref 0–0.5)
EOSINOPHIL NFR BLD AUTO: 2 % — SIGNIFICANT CHANGE UP (ref 0–6)
HCT VFR BLD CALC: 24 % — LOW (ref 39–50)
HGB BLD-MCNC: 8.2 G/DL — LOW (ref 13–17)
LYMPHOCYTES # BLD AUTO: 0.6 K/UL — LOW (ref 1–3.3)
LYMPHOCYTES # BLD AUTO: 79 % — HIGH (ref 13–44)
MCHC RBC-ENTMCNC: 29.3 PG — SIGNIFICANT CHANGE UP (ref 27–34)
MCHC RBC-ENTMCNC: 34.4 GM/DL — SIGNIFICANT CHANGE UP (ref 32–36)
MCV RBC AUTO: 85.3 FL — SIGNIFICANT CHANGE UP (ref 80–100)
MONOCYTES # BLD AUTO: 0 K/UL — SIGNIFICANT CHANGE UP (ref 0–0.9)
NEUTROPHILS # BLD AUTO: 0.2 K/UL — LOW (ref 1.8–7.4)
NEUTROPHILS NFR BLD AUTO: 19 % — LOW (ref 43–77)
PLAT MORPH BLD: NORMAL — SIGNIFICANT CHANGE UP
PLATELET # BLD AUTO: 6 K/UL — CRITICAL LOW (ref 150–400)
RBC # BLD: 2.81 M/UL — LOW (ref 4.2–5.8)
RBC # FLD: 12.1 % — SIGNIFICANT CHANGE UP (ref 10.3–14.5)
RBC BLD AUTO: SIGNIFICANT CHANGE UP
WBC # BLD: 0.8 K/UL — CRITICAL LOW (ref 3.8–10.5)
WBC # FLD AUTO: 0.8 K/UL — CRITICAL LOW (ref 3.8–10.5)

## 2017-01-31 ENCOUNTER — RESULT REVIEW (OUTPATIENT)
Age: 82
End: 2017-01-31

## 2017-02-01 ENCOUNTER — RESULT REVIEW (OUTPATIENT)
Age: 82
End: 2017-02-01

## 2017-02-01 ENCOUNTER — APPOINTMENT (OUTPATIENT)
Dept: INFUSION THERAPY | Facility: HOSPITAL | Age: 82
End: 2017-02-01

## 2017-02-01 ENCOUNTER — OUTPATIENT (OUTPATIENT)
Dept: OUTPATIENT SERVICES | Facility: HOSPITAL | Age: 82
LOS: 1 days | End: 2017-02-01
Payer: MEDICARE

## 2017-02-01 ENCOUNTER — APPOINTMENT (OUTPATIENT)
Dept: HEMATOLOGY ONCOLOGY | Facility: CLINIC | Age: 82
End: 2017-02-01

## 2017-02-01 VITALS
WEIGHT: 181.22 LBS | HEART RATE: 91 BPM | BODY MASS INDEX: 30.56 KG/M2 | SYSTOLIC BLOOD PRESSURE: 145 MMHG | DIASTOLIC BLOOD PRESSURE: 66 MMHG | TEMPERATURE: 97.7 F | OXYGEN SATURATION: 98 % | RESPIRATION RATE: 16 BRPM

## 2017-02-01 DIAGNOSIS — C95.00 ACUTE LEUKEMIA OF UNSPECIFIED CELL TYPE NOT HAVING ACHIEVED REMISSION: ICD-10-CM

## 2017-02-01 LAB
BASOPHILS # BLD AUTO: 0 K/UL — SIGNIFICANT CHANGE UP (ref 0–0.2)
BLD GP AB SCN SERPL QL: NEGATIVE — SIGNIFICANT CHANGE UP
EOSINOPHIL # BLD AUTO: 0.1 K/UL — SIGNIFICANT CHANGE UP (ref 0–0.5)
EOSINOPHIL NFR BLD AUTO: 8 % — HIGH (ref 0–6)
HCT VFR BLD CALC: 23 % — LOW (ref 39–50)
HGB BLD-MCNC: 7.8 G/DL — LOW (ref 13–17)
LYMPHOCYTES # BLD AUTO: 0.7 K/UL — LOW (ref 1–3.3)
LYMPHOCYTES # BLD AUTO: 80 % — HIGH (ref 13–44)
MCHC RBC-ENTMCNC: 29.1 PG — SIGNIFICANT CHANGE UP (ref 27–34)
MCHC RBC-ENTMCNC: 34 GM/DL — SIGNIFICANT CHANGE UP (ref 32–36)
MCV RBC AUTO: 85.4 FL — SIGNIFICANT CHANGE UP (ref 80–100)
MONOCYTES # BLD AUTO: 0 K/UL — SIGNIFICANT CHANGE UP (ref 0–0.9)
NEUTROPHILS # BLD AUTO: 0.2 K/UL — LOW (ref 1.8–7.4)
NEUTROPHILS NFR BLD AUTO: 12 % — LOW (ref 43–77)
PLAT MORPH BLD: NORMAL — SIGNIFICANT CHANGE UP
PLATELET # BLD AUTO: 6 K/UL — CRITICAL LOW (ref 150–400)
PLATELET # BLD MANUAL: 26 K/UL — LOW (ref 150–400)
RBC # BLD: 2.69 M/UL — LOW (ref 4.2–5.8)
RBC # FLD: 12 % — SIGNIFICANT CHANGE UP (ref 10.3–14.5)
RBC BLD AUTO: SIGNIFICANT CHANGE UP
RH IG SCN BLD-IMP: POSITIVE — SIGNIFICANT CHANGE UP
WBC # BLD: 0.9 K/UL — CRITICAL LOW (ref 3.8–10.5)
WBC # FLD AUTO: 0.9 K/UL — CRITICAL LOW (ref 3.8–10.5)

## 2017-02-01 PROCEDURE — 88313 SPECIAL STAINS GROUP 2: CPT | Mod: 26

## 2017-02-01 PROCEDURE — 88189 FLOWCYTOMETRY/READ 16 & >: CPT

## 2017-02-01 PROCEDURE — 88264 CHROMOSOME ANALYSIS 20-25: CPT

## 2017-02-01 PROCEDURE — 88237 TISSUE CULTURE BONE MARROW: CPT

## 2017-02-01 PROCEDURE — 88305 TISSUE EXAM BY PATHOLOGIST: CPT | Mod: 26

## 2017-02-01 PROCEDURE — 85097 BONE MARROW INTERPRETATION: CPT

## 2017-02-01 PROCEDURE — 86901 BLOOD TYPING SEROLOGIC RH(D): CPT

## 2017-02-01 PROCEDURE — 86923 COMPATIBILITY TEST ELECTRIC: CPT

## 2017-02-01 PROCEDURE — 88285 CHROMOSOME COUNT ADDITIONAL: CPT

## 2017-02-01 PROCEDURE — 86850 RBC ANTIBODY SCREEN: CPT

## 2017-02-01 PROCEDURE — 86900 BLOOD TYPING SEROLOGIC ABO: CPT

## 2017-02-01 PROCEDURE — 88280 CHROMOSOME KARYOTYPE STUDY: CPT

## 2017-02-01 PROCEDURE — 88360 TUMOR IMMUNOHISTOCHEM/MANUAL: CPT | Mod: 26

## 2017-02-02 ENCOUNTER — APPOINTMENT (OUTPATIENT)
Dept: INFUSION THERAPY | Facility: HOSPITAL | Age: 82
End: 2017-02-02

## 2017-02-02 DIAGNOSIS — R11.2 NAUSEA WITH VOMITING, UNSPECIFIED: ICD-10-CM

## 2017-02-02 DIAGNOSIS — Z51.89 ENCOUNTER FOR OTHER SPECIFIED AFTERCARE: ICD-10-CM

## 2017-02-02 DIAGNOSIS — Z51.11 ENCOUNTER FOR ANTINEOPLASTIC CHEMOTHERAPY: ICD-10-CM

## 2017-02-02 LAB
EOSINOPHIL # BLD AUTO: 0 K/UL — SIGNIFICANT CHANGE UP (ref 0–0.5)
EOSINOPHIL NFR BLD AUTO: 3 % — SIGNIFICANT CHANGE UP (ref 0–6)
HCT VFR BLD CALC: 22.8 % — LOW (ref 39–50)
HGB BLD-MCNC: 7.5 G/DL — LOW (ref 13–17)
LYMPHOCYTES # BLD AUTO: 0.7 K/UL — LOW (ref 1–3.3)
LYMPHOCYTES # BLD AUTO: 67 % — HIGH (ref 13–44)
MCHC RBC-ENTMCNC: 28.1 PG — SIGNIFICANT CHANGE UP (ref 27–34)
MCHC RBC-ENTMCNC: 32.9 GM/DL — SIGNIFICANT CHANGE UP (ref 32–36)
MCV RBC AUTO: 85.3 FL — SIGNIFICANT CHANGE UP (ref 80–100)
MONOCYTES # BLD AUTO: 0 K/UL — SIGNIFICANT CHANGE UP (ref 0–0.9)
MONOCYTES NFR BLD AUTO: 2 % — SIGNIFICANT CHANGE UP (ref 2–14)
NEUTROPHILS # BLD AUTO: 0.2 K/UL — LOW (ref 1.8–7.4)
NEUTROPHILS NFR BLD AUTO: 28 % — LOW (ref 43–77)
PLAT MORPH BLD: NORMAL — SIGNIFICANT CHANGE UP
PLATELET # BLD AUTO: 21 K/UL — LOW (ref 150–400)
PLATELET # BLD MANUAL: 30 K/UL — LOW (ref 150–400)
RBC # BLD: 2.67 M/UL — LOW (ref 4.2–5.8)
RBC # FLD: 12 % — SIGNIFICANT CHANGE UP (ref 10.3–14.5)
RBC BLD AUTO: SIGNIFICANT CHANGE UP
WBC # BLD: 1 K/UL — CRITICAL LOW (ref 3.8–10.5)
WBC # FLD AUTO: 1 K/UL — CRITICAL LOW (ref 3.8–10.5)

## 2017-02-03 ENCOUNTER — APPOINTMENT (OUTPATIENT)
Dept: INFUSION THERAPY | Facility: HOSPITAL | Age: 82
End: 2017-02-03

## 2017-02-05 ENCOUNTER — RESULT REVIEW (OUTPATIENT)
Age: 82
End: 2017-02-05

## 2017-02-06 ENCOUNTER — APPOINTMENT (OUTPATIENT)
Dept: INFUSION THERAPY | Facility: HOSPITAL | Age: 82
End: 2017-02-06

## 2017-02-06 LAB
EOSINOPHIL # BLD AUTO: 0.1 K/UL — SIGNIFICANT CHANGE UP (ref 0–0.5)
EOSINOPHIL NFR BLD AUTO: 4 % — SIGNIFICANT CHANGE UP (ref 0–6)
HCT VFR BLD CALC: 26.1 % — LOW (ref 39–50)
HEMATOPATHOLOGY REPORT: SIGNIFICANT CHANGE UP
HGB BLD-MCNC: 8.9 G/DL — LOW (ref 13–17)
LYMPHOCYTES # BLD AUTO: 0.6 K/UL — LOW (ref 1–3.3)
LYMPHOCYTES # BLD AUTO: 69 % — HIGH (ref 13–44)
MCHC RBC-ENTMCNC: 29.3 PG — SIGNIFICANT CHANGE UP (ref 27–34)
MCHC RBC-ENTMCNC: 34.1 GM/DL — SIGNIFICANT CHANGE UP (ref 32–36)
MCV RBC AUTO: 86.1 FL — SIGNIFICANT CHANGE UP (ref 80–100)
MONOCYTES # BLD AUTO: 0 K/UL — SIGNIFICANT CHANGE UP (ref 0–0.9)
MONOCYTES NFR BLD AUTO: 4 % — SIGNIFICANT CHANGE UP (ref 2–14)
NEUTROPHILS # BLD AUTO: 0.3 K/UL — LOW (ref 1.8–7.4)
NEUTROPHILS NFR BLD AUTO: 23 % — LOW (ref 43–77)
PLAT MORPH BLD: NORMAL — SIGNIFICANT CHANGE UP
PLATELET # BLD AUTO: 10 K/UL — CRITICAL LOW (ref 150–400)
PLATELET # BLD MANUAL: 8 K/UL — CRITICAL LOW (ref 150–400)
RBC # BLD: 3.03 M/UL — LOW (ref 4.2–5.8)
RBC # FLD: 13.2 % — SIGNIFICANT CHANGE UP (ref 10.3–14.5)
RBC BLD AUTO: SIGNIFICANT CHANGE UP
TM INTERPRETATION: SIGNIFICANT CHANGE UP
WBC # BLD: 1 K/UL — CRITICAL LOW (ref 3.8–10.5)
WBC # FLD AUTO: 1 K/UL — CRITICAL LOW (ref 3.8–10.5)

## 2017-02-07 ENCOUNTER — RESULT REVIEW (OUTPATIENT)
Age: 82
End: 2017-02-07

## 2017-02-08 ENCOUNTER — APPOINTMENT (OUTPATIENT)
Dept: HEMATOLOGY ONCOLOGY | Facility: CLINIC | Age: 82
End: 2017-02-08

## 2017-02-08 ENCOUNTER — APPOINTMENT (OUTPATIENT)
Dept: INFUSION THERAPY | Facility: HOSPITAL | Age: 82
End: 2017-02-08

## 2017-02-08 VITALS
SYSTOLIC BLOOD PRESSURE: 150 MMHG | OXYGEN SATURATION: 97 % | BODY MASS INDEX: 30.93 KG/M2 | TEMPERATURE: 97.9 F | DIASTOLIC BLOOD PRESSURE: 72 MMHG | HEART RATE: 74 BPM | RESPIRATION RATE: 16 BRPM | WEIGHT: 183.4 LBS

## 2017-02-08 LAB
BASOPHILS # BLD AUTO: 0 K/UL — SIGNIFICANT CHANGE UP (ref 0–0.2)
ELLIPTOCYTES BLD QL SMEAR: SLIGHT — SIGNIFICANT CHANGE UP
EOSINOPHIL # BLD AUTO: 0.1 K/UL — SIGNIFICANT CHANGE UP (ref 0–0.5)
EOSINOPHIL NFR BLD AUTO: 3 % — SIGNIFICANT CHANGE UP (ref 0–6)
HCT VFR BLD CALC: 24.1 % — LOW (ref 39–50)
HGB BLD-MCNC: 8.2 G/DL — LOW (ref 13–17)
LYMPHOCYTES # BLD AUTO: 0.6 K/UL — LOW (ref 1–3.3)
LYMPHOCYTES # BLD AUTO: 65 % — HIGH (ref 13–44)
MCHC RBC-ENTMCNC: 29.4 PG — SIGNIFICANT CHANGE UP (ref 27–34)
MCHC RBC-ENTMCNC: 34 GM/DL — SIGNIFICANT CHANGE UP (ref 32–36)
MCV RBC AUTO: 86.3 FL — SIGNIFICANT CHANGE UP (ref 80–100)
MONOCYTES # BLD AUTO: 0 K/UL — SIGNIFICANT CHANGE UP (ref 0–0.9)
NEUTROPHILS # BLD AUTO: 0.4 K/UL — LOW (ref 1.8–7.4)
NEUTROPHILS NFR BLD AUTO: 32 % — LOW (ref 43–77)
PLAT MORPH BLD: NORMAL — SIGNIFICANT CHANGE UP
PLATELET # BLD AUTO: 9 K/UL — CRITICAL LOW (ref 150–400)
RBC # BLD: 2.8 M/UL — LOW (ref 4.2–5.8)
RBC # FLD: 12.9 % — SIGNIFICANT CHANGE UP (ref 10.3–14.5)
RBC BLD AUTO: SIGNIFICANT CHANGE UP
WBC # BLD: 1.2 K/UL — LOW (ref 3.8–10.5)
WBC # FLD AUTO: 1.2 K/UL — LOW (ref 3.8–10.5)

## 2017-02-09 ENCOUNTER — RESULT REVIEW (OUTPATIENT)
Age: 82
End: 2017-02-09

## 2017-02-09 ENCOUNTER — APPOINTMENT (OUTPATIENT)
Dept: INFUSION THERAPY | Facility: HOSPITAL | Age: 82
End: 2017-02-09

## 2017-02-09 ENCOUNTER — FORM ENCOUNTER (OUTPATIENT)
Age: 82
End: 2017-02-09

## 2017-02-10 ENCOUNTER — OUTPATIENT (OUTPATIENT)
Dept: OUTPATIENT SERVICES | Facility: HOSPITAL | Age: 82
LOS: 1 days | End: 2017-02-10
Payer: MEDICARE

## 2017-02-10 ENCOUNTER — APPOINTMENT (OUTPATIENT)
Dept: CT IMAGING | Facility: IMAGING CENTER | Age: 82
End: 2017-02-10

## 2017-02-10 ENCOUNTER — APPOINTMENT (OUTPATIENT)
Dept: INFUSION THERAPY | Facility: HOSPITAL | Age: 82
End: 2017-02-10

## 2017-02-10 DIAGNOSIS — C92.00 ACUTE MYELOBLASTIC LEUKEMIA, NOT HAVING ACHIEVED REMISSION: ICD-10-CM

## 2017-02-10 LAB
BASOPHILS # BLD AUTO: 0 K/UL — SIGNIFICANT CHANGE UP (ref 0–0.2)
EOSINOPHIL # BLD AUTO: 0.1 K/UL — SIGNIFICANT CHANGE UP (ref 0–0.5)
EOSINOPHIL NFR BLD AUTO: 8 % — HIGH (ref 0–6)
HCT VFR BLD CALC: 23.8 % — LOW (ref 39–50)
HGB BLD-MCNC: 8.1 G/DL — LOW (ref 13–17)
LYMPHOCYTES # BLD AUTO: 0.8 K/UL — LOW (ref 1–3.3)
LYMPHOCYTES # BLD AUTO: 68 % — HIGH (ref 13–44)
MCHC RBC-ENTMCNC: 29.1 PG — SIGNIFICANT CHANGE UP (ref 27–34)
MCHC RBC-ENTMCNC: 33.9 GM/DL — SIGNIFICANT CHANGE UP (ref 32–36)
MCV RBC AUTO: 85.9 FL — SIGNIFICANT CHANGE UP (ref 80–100)
MONOCYTES # BLD AUTO: 0 K/UL — SIGNIFICANT CHANGE UP (ref 0–0.9)
NEUTROPHILS # BLD AUTO: 0.2 K/UL — LOW (ref 1.8–7.4)
NEUTROPHILS NFR BLD AUTO: 24 % — LOW (ref 43–77)
PLAT MORPH BLD: NORMAL — SIGNIFICANT CHANGE UP
PLATELET # BLD AUTO: 10 K/UL — CRITICAL LOW (ref 150–400)
PLATELET # BLD MANUAL: 17 K/UL — CRITICAL LOW (ref 150–400)
RBC # BLD: 2.77 M/UL — LOW (ref 4.2–5.8)
RBC # FLD: 13 % — SIGNIFICANT CHANGE UP (ref 10.3–14.5)
RBC BLD AUTO: SIGNIFICANT CHANGE UP
WBC # BLD: 1.1 K/UL — LOW (ref 3.8–10.5)
WBC # FLD AUTO: 1.1 K/UL — LOW (ref 3.8–10.5)

## 2017-02-10 PROCEDURE — 70450 CT HEAD/BRAIN W/O DYE: CPT

## 2017-02-12 ENCOUNTER — RESULT REVIEW (OUTPATIENT)
Age: 82
End: 2017-02-12

## 2017-02-13 ENCOUNTER — LABORATORY RESULT (OUTPATIENT)
Age: 82
End: 2017-02-13

## 2017-02-13 ENCOUNTER — APPOINTMENT (OUTPATIENT)
Dept: INFUSION THERAPY | Facility: HOSPITAL | Age: 82
End: 2017-02-13

## 2017-02-13 LAB
BASOPHILS # BLD AUTO: 0 K/UL — SIGNIFICANT CHANGE UP (ref 0–0.2)
BLD GP AB SCN SERPL QL: NEGATIVE — SIGNIFICANT CHANGE UP
EOSINOPHIL # BLD AUTO: 0.1 K/UL — SIGNIFICANT CHANGE UP (ref 0–0.5)
EOSINOPHIL NFR BLD AUTO: 7 % — HIGH (ref 0–6)
HCT VFR BLD CALC: 20.8 % — CRITICAL LOW (ref 39–50)
HGB BLD-MCNC: 7.3 G/DL — LOW (ref 13–17)
LYMPHOCYTES # BLD AUTO: 0.5 K/UL — LOW (ref 1–3.3)
LYMPHOCYTES # BLD AUTO: 60 % — HIGH (ref 13–44)
MCHC RBC-ENTMCNC: 29.9 PG — SIGNIFICANT CHANGE UP (ref 27–34)
MCHC RBC-ENTMCNC: 35.2 G/DL — SIGNIFICANT CHANGE UP (ref 32–36)
MCV RBC AUTO: 85 FL — SIGNIFICANT CHANGE UP (ref 80–100)
MONOCYTES # BLD AUTO: 0 K/UL — SIGNIFICANT CHANGE UP (ref 0–0.9)
MONOCYTES NFR BLD AUTO: 2 % — SIGNIFICANT CHANGE UP (ref 2–14)
NEUTROPHILS # BLD AUTO: 0.3 K/UL — LOW (ref 1.8–7.4)
NEUTROPHILS NFR BLD AUTO: 30 % — LOW (ref 43–77)
NEUTS BAND # BLD: 1 % — SIGNIFICANT CHANGE UP (ref 0–8)
PLAT MORPH BLD: NORMAL — SIGNIFICANT CHANGE UP
PLATELET # BLD AUTO: 12 K/UL — CRITICAL LOW (ref 150–400)
RBC # BLD: 2.45 M/UL — LOW (ref 4.2–5.8)
RBC # FLD: 13 % — SIGNIFICANT CHANGE UP (ref 10.3–14.5)
RBC BLD AUTO: SIGNIFICANT CHANGE UP
RH IG SCN BLD-IMP: POSITIVE — SIGNIFICANT CHANGE UP
WBC # BLD: 0.9 K/UL — CRITICAL LOW (ref 3.8–10.5)
WBC # FLD AUTO: 0.9 K/UL — CRITICAL LOW (ref 3.8–10.5)

## 2017-02-14 ENCOUNTER — RESULT REVIEW (OUTPATIENT)
Age: 82
End: 2017-02-14

## 2017-02-14 ENCOUNTER — APPOINTMENT (OUTPATIENT)
Dept: INFUSION THERAPY | Facility: HOSPITAL | Age: 82
End: 2017-02-14

## 2017-02-14 DIAGNOSIS — C92.00 ACUTE MYELOBLASTIC LEUKEMIA, NOT HAVING ACHIEVED REMISSION: ICD-10-CM

## 2017-02-15 ENCOUNTER — RESULT REVIEW (OUTPATIENT)
Age: 82
End: 2017-02-15

## 2017-02-15 ENCOUNTER — APPOINTMENT (OUTPATIENT)
Dept: INFUSION THERAPY | Facility: HOSPITAL | Age: 82
End: 2017-02-15

## 2017-02-15 LAB
ANISOCYTOSIS BLD QL: SLIGHT — SIGNIFICANT CHANGE UP
BASOPHILS # BLD AUTO: 0 K/UL — SIGNIFICANT CHANGE UP (ref 0–0.2)
DACRYOCYTES BLD QL SMEAR: SLIGHT — SIGNIFICANT CHANGE UP
ELLIPTOCYTES BLD QL SMEAR: SLIGHT — SIGNIFICANT CHANGE UP
EOSINOPHIL # BLD AUTO: 0 K/UL — SIGNIFICANT CHANGE UP (ref 0–0.5)
HCT VFR BLD CALC: 26 % — LOW (ref 39–50)
HGB BLD-MCNC: 8.7 G/DL — LOW (ref 13–17)
HYPOCHROMIA BLD QL: SLIGHT — SIGNIFICANT CHANGE UP
LYMPHOCYTES # BLD AUTO: 0.6 K/UL — LOW (ref 1–3.3)
LYMPHOCYTES # BLD AUTO: 80 % — HIGH (ref 13–44)
MACROCYTES BLD QL: SLIGHT — SIGNIFICANT CHANGE UP
MCHC RBC-ENTMCNC: 29.3 PG — SIGNIFICANT CHANGE UP (ref 27–34)
MCHC RBC-ENTMCNC: 33.6 G/DL — SIGNIFICANT CHANGE UP (ref 32–36)
MCV RBC AUTO: 87.4 FL — SIGNIFICANT CHANGE UP (ref 80–100)
MICROCYTES BLD QL: SLIGHT — SIGNIFICANT CHANGE UP
MONOCYTES # BLD AUTO: 0 K/UL — SIGNIFICANT CHANGE UP (ref 0–0.9)
NEUTROPHILS # BLD AUTO: 0.3 K/UL — LOW (ref 1.8–7.4)
NEUTROPHILS NFR BLD AUTO: 20 % — LOW (ref 43–77)
PLAT MORPH BLD: NORMAL — SIGNIFICANT CHANGE UP
PLATELET # BLD AUTO: 10 K/UL — CRITICAL LOW (ref 150–400)
POIKILOCYTOSIS BLD QL AUTO: SLIGHT — SIGNIFICANT CHANGE UP
POLYCHROMASIA BLD QL SMEAR: SLIGHT — SIGNIFICANT CHANGE UP
RBC # BLD: 2.97 M/UL — LOW (ref 4.2–5.8)
RBC # FLD: 13 % — SIGNIFICANT CHANGE UP (ref 10.3–14.5)
RBC BLD AUTO: SIGNIFICANT CHANGE UP
SCHISTOCYTES BLD QL AUTO: SLIGHT — SIGNIFICANT CHANGE UP
WBC # BLD: 0.9 K/UL — CRITICAL LOW (ref 3.8–10.5)
WBC # FLD AUTO: 0.9 K/UL — CRITICAL LOW (ref 3.8–10.5)

## 2017-02-16 ENCOUNTER — APPOINTMENT (OUTPATIENT)
Dept: INFUSION THERAPY | Facility: HOSPITAL | Age: 82
End: 2017-02-16

## 2017-02-16 ENCOUNTER — INPATIENT (INPATIENT)
Facility: HOSPITAL | Age: 82
LOS: 13 days | Discharge: HOME | DRG: 871 | End: 2017-03-02
Attending: INTERNAL MEDICINE | Admitting: STUDENT IN AN ORGANIZED HEALTH CARE EDUCATION/TRAINING PROGRAM
Payer: MEDICARE

## 2017-02-16 VITALS
TEMPERATURE: 101 F | DIASTOLIC BLOOD PRESSURE: 74 MMHG | SYSTOLIC BLOOD PRESSURE: 168 MMHG | RESPIRATION RATE: 22 BRPM | HEART RATE: 96 BPM | OXYGEN SATURATION: 99 %

## 2017-02-16 DIAGNOSIS — N40.0 BENIGN PROSTATIC HYPERPLASIA WITHOUT LOWER URINARY TRACT SYMPTOMS: ICD-10-CM

## 2017-02-16 DIAGNOSIS — C92.00 ACUTE MYELOBLASTIC LEUKEMIA, NOT HAVING ACHIEVED REMISSION: ICD-10-CM

## 2017-02-16 DIAGNOSIS — Z41.8 ENCOUNTER FOR OTHER PROCEDURES FOR PURPOSES OTHER THAN REMEDYING HEALTH STATE: ICD-10-CM

## 2017-02-16 DIAGNOSIS — D61.818 OTHER PANCYTOPENIA: ICD-10-CM

## 2017-02-16 DIAGNOSIS — D70.9 NEUTROPENIA, UNSPECIFIED: ICD-10-CM

## 2017-02-16 DIAGNOSIS — A41.9 SEPSIS, UNSPECIFIED ORGANISM: ICD-10-CM

## 2017-02-16 DIAGNOSIS — I10 ESSENTIAL (PRIMARY) HYPERTENSION: ICD-10-CM

## 2017-02-16 LAB
ALBUMIN SERPL ELPH-MCNC: 3.3 G/DL — SIGNIFICANT CHANGE UP (ref 3.3–5)
ALP SERPL-CCNC: 101 U/L — SIGNIFICANT CHANGE UP (ref 40–120)
ALT FLD-CCNC: 25 U/L RC — SIGNIFICANT CHANGE UP (ref 10–45)
ANION GAP SERPL CALC-SCNC: 14 MMOL/L — SIGNIFICANT CHANGE UP (ref 5–17)
AST SERPL-CCNC: 22 U/L — SIGNIFICANT CHANGE UP (ref 10–40)
BASOPHILS # BLD AUTO: 0 K/UL — SIGNIFICANT CHANGE UP (ref 0–0.2)
BASOPHILS # BLD AUTO: 0 K/UL — SIGNIFICANT CHANGE UP (ref 0–0.2)
BASOPHILS NFR BLD AUTO: 0 % — SIGNIFICANT CHANGE UP (ref 0–2)
BILIRUB SERPL-MCNC: 0.9 MG/DL — SIGNIFICANT CHANGE UP (ref 0.2–1.2)
BLD GP AB SCN SERPL QL: NEGATIVE — SIGNIFICANT CHANGE UP
BUN SERPL-MCNC: 12 MG/DL — SIGNIFICANT CHANGE UP (ref 7–23)
CALCIUM SERPL-MCNC: 10 MG/DL — SIGNIFICANT CHANGE UP (ref 8.4–10.5)
CHLORIDE SERPL-SCNC: 98 MMOL/L — SIGNIFICANT CHANGE UP (ref 96–108)
CHROM ANALY OVERALL INTERP SPEC-IMP: SIGNIFICANT CHANGE UP
CO2 SERPL-SCNC: 25 MMOL/L — SIGNIFICANT CHANGE UP (ref 22–31)
CREAT SERPL-MCNC: 0.84 MG/DL — SIGNIFICANT CHANGE UP (ref 0.5–1.3)
EOSINOPHIL # BLD AUTO: 0 K/UL — SIGNIFICANT CHANGE UP (ref 0–0.5)
EOSINOPHIL # BLD AUTO: 0.1 K/UL — SIGNIFICANT CHANGE UP (ref 0–0.5)
EOSINOPHIL NFR BLD AUTO: 0.7 % — SIGNIFICANT CHANGE UP (ref 0–6)
EOSINOPHIL NFR BLD AUTO: 8 % — HIGH (ref 0–6)
GAS PNL BLDV: SIGNIFICANT CHANGE UP
GLUCOSE SERPL-MCNC: 108 MG/DL — HIGH (ref 70–99)
HCT VFR BLD CALC: 25.9 % — LOW (ref 39–50)
HCT VFR BLD CALC: 27.5 % — LOW (ref 39–50)
HGB BLD-MCNC: 8.7 G/DL — LOW (ref 13–17)
HGB BLD-MCNC: 9.7 G/DL — LOW (ref 13–17)
LYMPHOCYTES # BLD AUTO: 0.5 K/UL — LOW (ref 1–3.3)
LYMPHOCYTES # BLD AUTO: 0.7 K/UL — LOW (ref 1–3.3)
LYMPHOCYTES # BLD AUTO: 68 % — HIGH (ref 13–44)
LYMPHOCYTES # BLD AUTO: 74 % — HIGH (ref 13–44)
MCHC RBC-ENTMCNC: 29.2 PG — SIGNIFICANT CHANGE UP (ref 27–34)
MCHC RBC-ENTMCNC: 30.6 PG — SIGNIFICANT CHANGE UP (ref 27–34)
MCHC RBC-ENTMCNC: 33.6 G/DL — SIGNIFICANT CHANGE UP (ref 32–36)
MCHC RBC-ENTMCNC: 35.2 GM/DL — SIGNIFICANT CHANGE UP (ref 32–36)
MCV RBC AUTO: 86.8 FL — SIGNIFICANT CHANGE UP (ref 80–100)
MCV RBC AUTO: 87 FL — SIGNIFICANT CHANGE UP (ref 80–100)
MONOCYTES # BLD AUTO: 0 K/UL — SIGNIFICANT CHANGE UP (ref 0–0.9)
MONOCYTES # BLD AUTO: 0 K/UL — SIGNIFICANT CHANGE UP (ref 0–0.9)
MONOCYTES NFR BLD AUTO: 0.7 % — LOW (ref 2–14)
NEUTROPHILS # BLD AUTO: 0.2 K/UL — LOW (ref 1.8–7.4)
NEUTROPHILS # BLD AUTO: 0.2 K/UL — LOW (ref 1.8–7.4)
NEUTROPHILS NFR BLD AUTO: 24 % — LOW (ref 43–77)
NEUTROPHILS NFR BLD AUTO: 24.6 % — LOW (ref 43–77)
PLAT MORPH BLD: NORMAL — SIGNIFICANT CHANGE UP
PLATELET # BLD AUTO: 10 K/UL — CRITICAL LOW (ref 150–400)
PLATELET # BLD AUTO: 11 K/UL — CRITICAL LOW (ref 150–400)
POTASSIUM SERPL-MCNC: 3.9 MMOL/L — SIGNIFICANT CHANGE UP (ref 3.5–5.3)
POTASSIUM SERPL-SCNC: 3.9 MMOL/L — SIGNIFICANT CHANGE UP (ref 3.5–5.3)
PROT SERPL-MCNC: 7.1 G/DL — SIGNIFICANT CHANGE UP (ref 6–8.3)
RAPID RVP RESULT: SIGNIFICANT CHANGE UP
RBC # BLD: 2.99 M/UL — LOW (ref 4.2–5.8)
RBC # BLD: 3.16 M/UL — LOW (ref 4.2–5.8)
RBC # FLD: 13.1 % — SIGNIFICANT CHANGE UP (ref 10.3–14.5)
RBC # FLD: 13.4 % — SIGNIFICANT CHANGE UP (ref 10.3–14.5)
RBC BLD AUTO: SIGNIFICANT CHANGE UP
RH IG SCN BLD-IMP: POSITIVE — SIGNIFICANT CHANGE UP
SODIUM SERPL-SCNC: 137 MMOL/L — SIGNIFICANT CHANGE UP (ref 135–145)
WBC # BLD: 0.8 K/UL — CRITICAL LOW (ref 3.8–10.5)
WBC # BLD: 1 K/UL — CRITICAL LOW (ref 3.8–10.5)
WBC # FLD AUTO: 0.8 K/UL — CRITICAL LOW (ref 3.8–10.5)
WBC # FLD AUTO: 1 K/UL — CRITICAL LOW (ref 3.8–10.5)

## 2017-02-16 PROCEDURE — 71010: CPT | Mod: 26

## 2017-02-16 PROCEDURE — 93010 ELECTROCARDIOGRAM REPORT: CPT

## 2017-02-16 PROCEDURE — 99223 1ST HOSP IP/OBS HIGH 75: CPT

## 2017-02-16 PROCEDURE — 99285 EMERGENCY DEPT VISIT HI MDM: CPT | Mod: 25,GC

## 2017-02-16 PROCEDURE — 99223 1ST HOSP IP/OBS HIGH 75: CPT | Mod: AI,GC

## 2017-02-16 RX ORDER — LOSARTAN POTASSIUM 100 MG/1
50 TABLET, FILM COATED ORAL DAILY
Qty: 0 | Refills: 0 | Status: DISCONTINUED | OUTPATIENT
Start: 2017-02-16 | End: 2017-02-16

## 2017-02-16 RX ORDER — ACETAMINOPHEN 500 MG
1000 TABLET ORAL ONCE
Qty: 0 | Refills: 0 | Status: COMPLETED | OUTPATIENT
Start: 2017-02-16 | End: 2017-02-16

## 2017-02-16 RX ORDER — ACETAMINOPHEN 500 MG
650 TABLET ORAL EVERY 6 HOURS
Qty: 0 | Refills: 0 | Status: DISCONTINUED | OUTPATIENT
Start: 2017-02-16 | End: 2017-03-02

## 2017-02-16 RX ORDER — DOXAZOSIN MESYLATE 4 MG
8 TABLET ORAL AT BEDTIME
Qty: 0 | Refills: 0 | Status: DISCONTINUED | OUTPATIENT
Start: 2017-02-16 | End: 2017-02-17

## 2017-02-16 RX ORDER — VANCOMYCIN HCL 1 G
1000 VIAL (EA) INTRAVENOUS ONCE
Qty: 0 | Refills: 0 | Status: COMPLETED | OUTPATIENT
Start: 2017-02-16 | End: 2017-02-16

## 2017-02-16 RX ORDER — FLUCONAZOLE 150 MG/1
200 TABLET ORAL DAILY
Qty: 0 | Refills: 0 | Status: DISCONTINUED | OUTPATIENT
Start: 2017-02-16 | End: 2017-03-02

## 2017-02-16 RX ORDER — CEFEPIME 1 G/1
2000 INJECTION, POWDER, FOR SOLUTION INTRAMUSCULAR; INTRAVENOUS ONCE
Qty: 0 | Refills: 0 | Status: COMPLETED | OUTPATIENT
Start: 2017-02-16 | End: 2017-02-16

## 2017-02-16 RX ORDER — SODIUM CHLORIDE 9 MG/ML
1000 INJECTION INTRAMUSCULAR; INTRAVENOUS; SUBCUTANEOUS ONCE
Qty: 0 | Refills: 0 | Status: COMPLETED | OUTPATIENT
Start: 2017-02-16 | End: 2017-02-16

## 2017-02-16 RX ORDER — CEFEPIME 1 G/1
2000 INJECTION, POWDER, FOR SOLUTION INTRAMUSCULAR; INTRAVENOUS EVERY 8 HOURS
Qty: 0 | Refills: 0 | Status: DISCONTINUED | OUTPATIENT
Start: 2017-02-16 | End: 2017-02-17

## 2017-02-16 RX ORDER — SODIUM CHLORIDE 9 MG/ML
1000 INJECTION INTRAMUSCULAR; INTRAVENOUS; SUBCUTANEOUS
Qty: 0 | Refills: 0 | Status: DISCONTINUED | OUTPATIENT
Start: 2017-02-16 | End: 2017-02-17

## 2017-02-16 RX ADMIN — CEFEPIME 100 MILLIGRAM(S): 1 INJECTION, POWDER, FOR SOLUTION INTRAMUSCULAR; INTRAVENOUS at 15:13

## 2017-02-16 RX ADMIN — Medication 250 MILLIGRAM(S): at 16:57

## 2017-02-16 RX ADMIN — SODIUM CHLORIDE 2000 MILLILITER(S): 9 INJECTION INTRAMUSCULAR; INTRAVENOUS; SUBCUTANEOUS at 14:57

## 2017-02-16 RX ADMIN — Medication 400 MILLIGRAM(S): at 17:57

## 2017-02-16 RX ADMIN — Medication 8 MILLIGRAM(S): at 23:41

## 2017-02-16 RX ADMIN — CEFEPIME 100 MILLIGRAM(S): 1 INJECTION, POWDER, FOR SOLUTION INTRAMUSCULAR; INTRAVENOUS at 22:29

## 2017-02-16 NOTE — H&P ADULT. - PROBLEM SELECTOR PLAN 2
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

## 2017-02-16 NOTE — H&P ADULT. - PROBLEM SELECTOR PLAN 3
-  -  -h/o of hypertension, current /79   -would hold home htn meds in setting of sepsis.  - would add on antihertensives as needed if BP > 170/90 -h/o of hypertension, current /79   -would hold home htn meds in setting of sepsis.  - would add on antihertensives as needed if BP > 170/90 -h/o of hypertension, current /79   - will start losartan 50 mg qd and HCTZ 12.5 (only half of home dose given sepsis)  - monitor blood pressure; if continues to remain hypertensive, will titrate up dose of losartan and HCTZ -h/o of hypertension, initially hypertensive on presentation but now SBP in 130  - will hold home losartan/hctz for now and monitor BP in setting of sepsis  - monitor blood pressure; if uptrends will restart losartan and HCTZ

## 2017-02-16 NOTE — H&P ADULT. - PROBLEM SELECTOR PLAN 5
-On Cylce 7 Vidaza   -c/b Pancytopenia, neutropeni   -Hematology following pt  - would f/u recs -On Cylce 7 of Vidaza   -c/b Pancytopenia, neutropenia   -Hematology following pt  - would f/u recs -On Cylce 7 of Vidaza   -c/b Pancytopenia, neutropenia   -Hematology consult appreciated

## 2017-02-16 NOTE — ED PROVIDER NOTE - PHYSICAL EXAMINATION
Gen: NAD  Eyes: PERRL, EOMI. sclerae white, no icterus  ENT: Dry mucous membranes. No exudates  Neck: supple, no LAD, mass or goiter, trachea midline  CV: RRR. Audible S1 and S2. No murmurs, rubs, gallops, S3, nor S4  Pulm: Clear to auscultation bilaterally. No wheezes, rales, or rhonchi  Abd: BS+, nondistended, No tenderness to palpation  Musculoskeletal:  No edema  Skin: no lesions or scars noted  Psych: mood good, affect full range and congruent with mood.  Neurologic: AAOx3, Cranial nerves grossly intact

## 2017-02-16 NOTE — H&P ADULT. - PROBLEM SELECTOR PLAN 1
-With tachycardia (119), fever (102.9), lactate 2.2 , tachypnea (22), leukopenic  -not hypotensive & satting 99%O2 sats  -Bcx and Ucx collected  -received Vancomycin 1g and Cefepime 2g  -Would continue Cefepime 2g Q8 for Neutropenic fever  -Tylenol and cooling blanket for fever  -monitor Vitals, hold antiherpentives in setting of sepsis  -Would bolus if hypotensive  -Hematology following -With tachycardia (119), fever (102.9), lactate 2.2 , tachypnea (22), leukopenic  -not hypotensive & satting 99%O2 sats  -Bcx collected,  Ucx, UA, CXR, RVP, EKG  ordered  -would f/u resluts  -received Vancomycin 1g and Cefepime 2g  -Would continue Cefepime 2g Q8 for Neutropenic fever  -Tylenol and cooling blanket for fever  -monitor Vitals, hold antiherpentives in setting of sepsis  -Would bolus if hypotensive  -Hematology following -With tachycardia (119), fever (102.9), lactate 2.2 , tachypnea (22), leukopenic  -not hypotensive & satting 99%O2 sats  -Bcx collected,  Ucx, UA, CXR, RVP, EKG  ordered  -would f/u results  -received Vancomycin 1g and Cefepime 2g  -Would continue Cefepime 2g Q8 for Neutropenic fever  -Tylenol and cooling blanket for fever  -Would bolus if hypotensive  -Hematology following -With tachycardia (119), fever (102.9), lactate 2.2 , tachypnea (22), leukopenic  -not hypotensive & satting 99%O2 on RA  -Bcx collected,  Ucx, UA, RVP, EKG  ordered  -received Vancomycin 1g and Cefepime 2g  -Would continue Cefepime 2g Q8 for Neutropenic fever as discussed with hematology service  -no clear indication for vanc at this time but if continues to spike and HD unstable, can restart vanc  -Tylenol and cooling blanket for fever  -bolus if hypotensive, for now c/w NS @ 40cc/hr

## 2017-02-16 NOTE — ED ADULT NURSE REASSESSMENT NOTE - NS ED NURSE REASSESS COMMENT FT1
transfusion of S&S of transfusion reaction explained to patient, patient verbalized understanding consent in chart 2 rn's confirmed. family at bedside.

## 2017-02-16 NOTE — ED PROVIDER NOTE - OBJECTIVE STATEMENT
Neutropenic fever.   Hematologsit: Dr. Any Sarmiento 84 y/o M w/ Hx of HTN and AML on Azacitidine (Vidaza), p/w neutropenic fever. Had chemo today, after chemo developed fever to 100.5, 100.7 here in ER. Is on levaquin and fluzonazole prophylactically. Reports HA for past wk, had CT 5 days ago, which was unremarkable. Denies cough, SOB, vomiting, diarrhea, or dysuria.   Hematologsit: Dr. Any Sarmiento

## 2017-02-16 NOTE — H&P ADULT. - RADIOLOGY RESULTS AND INTERPRETATION
X-Ray personally reviewed CXR: unofficially trace R pleural effusion but no infiltrate or acute findings, f/u official rady

## 2017-02-16 NOTE — ED ADULT NURSE REASSESSMENT NOTE - NS ED NURSE REASSESS COMMENT FT1
RVP ordered from 1617 this afternoon was not done by previous RN. Pt is not RTM because of pending RVP. RVP performed and sent to lab and flow RN [place] notified. Pt aware of delay and plan of care.

## 2017-02-16 NOTE — ED ADULT NURSE REASSESSMENT NOTE - NS ED NURSE REASSESS COMMENT FT1
Report taken from Shannon LAST in [place]. Pt resting in stretcher with family at bedside in [facility]. Pt is still febrile but reports feeling better. Pt is admitted and awaiting bed placement. Pt ambulates with cane and is A&Ox3.

## 2017-02-16 NOTE — H&P ADULT. - LAB RESULTS AND INTERPRETATION
Significant for neutropenia, pancytopenic, with lactate elevation to 2.2. Labs are indicative of a hematologic dysfunction. labs reviewed: Significant for neutropenia , platelets 10, hgb 9.7 stable pancytopenic, with lactate elevation to 2.2.

## 2017-02-16 NOTE — ED PROVIDER NOTE - MEDICAL DECISION MAKING DETAILS
84 y/o M w/ AML on chemo p/w neutropenic fever. Reports HA for past 2 wks w/ neg CT head 5 days ago. Otherwise, no complaints. Plan: Cefepmie/vanc, admission. 82 y/o M w/ AML on chemo p/w neutropenic fever. Reports HA for past 2 wks w/ neg CT head 5 days ago. Otherwise, no complaints. Plan: Cefepmie/vanc, admission.    Attending MD Chappell: 3 yo male with PMH for HTN and AML present with complaint of fever. Tmax 100.7.  Has had complaint of headaches over past few weeks and recently had a negative head CT.  Denies N/V/D.  Found to have plts of 10, hem/onc at bedside recommending platelets.  Will pan-culture and give empiric IV abx and admit.

## 2017-02-16 NOTE — H&P ADULT. - PROBLEM SELECTOR PLAN 4
-BP is 193/73  -Would continue Doxazosin to pervent obstruction - stable  -Would continue Doxazosin to prevent obstruction - stable  -Would continue Doxazosin to prevent obstruction however consider holding if becomes hypotensive give alpha effects

## 2017-02-16 NOTE — H&P ADULT. - PROBLEM SELECTOR PLAN 6
2/2 AML  -daily CBC  -Transfuse 1U PRBC if hgb <7  -Transfuse 1U Platetelet if if plt < 15 (if patient is febrile)  -Transfuse 1U Platetelet if if plt < 10 (if patient is afebrile)  -pre-treat patient with tylenol before platelet tx

## 2017-02-16 NOTE — ED PROVIDER NOTE - ATTENDING CONTRIBUTION TO CARE
Attending MD Chappell:  I personally have seen and examined this patient.  Resident note reviewed and agree on plan of care and except where noted.  See MDM for details.

## 2017-02-16 NOTE — H&P ADULT. - PROBLEM SELECTOR PLAN 7
-SCDs for DVT ppx   -No chemical DVT ppx 2/2 thrombocytopenia  - -SCDs for DVT ppx   -No chemical DVT ppx 2/2 thrombocytopenia

## 2017-02-16 NOTE — H&P ADULT. - ASSESSMENT
84 yo M PMHx HTN, AML dx 6/2016 on Cycle 7 chemo Vidaza here for neutropenic fever found to be septic 82 yo M PMHx HTN, AML dx 6/2016 on Cycle 7 chemo Vidaza here for neutropenic fever, meets sepsis criteria although source unclear, will treat with broad antibiotics at this time 82 yo M PMHx HTN, AML dx 6/2016 on Cycle 7 chemo Vidaza here for neutropenic fever, meets sepsis criteria although source unclear, will treat with broad spectrum antibiotics at this time

## 2017-02-17 ENCOUNTER — APPOINTMENT (OUTPATIENT)
Dept: INFUSION THERAPY | Facility: HOSPITAL | Age: 82
End: 2017-02-17

## 2017-02-17 LAB
ANION GAP SERPL CALC-SCNC: 14 MMOL/L — SIGNIFICANT CHANGE UP (ref 5–17)
APPEARANCE UR: CLEAR — SIGNIFICANT CHANGE UP
APTT BLD: 27.6 SEC — SIGNIFICANT CHANGE UP (ref 27.5–37.4)
BILIRUB UR-MCNC: NEGATIVE — SIGNIFICANT CHANGE UP
BLD GP AB SCN SERPL QL: NEGATIVE — SIGNIFICANT CHANGE UP
BUN SERPL-MCNC: 14 MG/DL — SIGNIFICANT CHANGE UP (ref 7–23)
CALCIUM SERPL-MCNC: 9.5 MG/DL — SIGNIFICANT CHANGE UP (ref 8.4–10.5)
CHLORIDE SERPL-SCNC: 100 MMOL/L — SIGNIFICANT CHANGE UP (ref 96–108)
CO2 SERPL-SCNC: 24 MMOL/L — SIGNIFICANT CHANGE UP (ref 22–31)
COLOR SPEC: SIGNIFICANT CHANGE UP
CREAT SERPL-MCNC: 0.77 MG/DL — SIGNIFICANT CHANGE UP (ref 0.5–1.3)
DATE OF TRANSF RX: SIGNIFICANT CHANGE UP
DATE OF TRANSF RX: SIGNIFICANT CHANGE UP
DIFF PNL FLD: NEGATIVE — SIGNIFICANT CHANGE UP
EOSINOPHIL NFR BLD AUTO: 2 % — SIGNIFICANT CHANGE UP (ref 0–6)
GLUCOSE SERPL-MCNC: 112 MG/DL — HIGH (ref 70–99)
GLUCOSE UR QL: NEGATIVE — SIGNIFICANT CHANGE UP
GRAM STN FLD: SIGNIFICANT CHANGE UP
HCT VFR BLD CALC: 23.1 % — LOW (ref 39–50)
HGB BLD-MCNC: 8 G/DL — LOW (ref 13–17)
INR BLD: 1.71 RATIO — HIGH (ref 0.88–1.16)
KETONES UR-MCNC: NEGATIVE — SIGNIFICANT CHANGE UP
LEUKOCYTE ESTERASE UR-ACNC: NEGATIVE — SIGNIFICANT CHANGE UP
LYMPHOCYTES # BLD AUTO: 0.5 K/UL — LOW (ref 1–3.3)
LYMPHOCYTES # BLD AUTO: 74 % — HIGH (ref 13–44)
MAGNESIUM SERPL-MCNC: 1.6 MG/DL — SIGNIFICANT CHANGE UP (ref 1.6–2.6)
MCHC RBC-ENTMCNC: 30.2 PG — SIGNIFICANT CHANGE UP (ref 27–34)
MCHC RBC-ENTMCNC: 34.5 GM/DL — SIGNIFICANT CHANGE UP (ref 32–36)
MCV RBC AUTO: 87.4 FL — SIGNIFICANT CHANGE UP (ref 80–100)
MONOCYTES NFR BLD AUTO: 4 % — SIGNIFICANT CHANGE UP (ref 2–14)
NEUTROPHILS # BLD AUTO: 0.1 K/UL — LOW (ref 1.8–7.4)
NEUTROPHILS NFR BLD AUTO: 16 % — LOW (ref 43–77)
NITRITE UR-MCNC: NEGATIVE — SIGNIFICANT CHANGE UP
PH UR: 6.5 — SIGNIFICANT CHANGE UP (ref 4.8–8)
PHOSPHATE SERPL-MCNC: 2.7 MG/DL — SIGNIFICANT CHANGE UP (ref 2.5–4.5)
PLATELET # BLD AUTO: 8 K/UL — CRITICAL LOW (ref 150–400)
POTASSIUM SERPL-MCNC: 3.4 MMOL/L — LOW (ref 3.5–5.3)
POTASSIUM SERPL-SCNC: 3.4 MMOL/L — LOW (ref 3.5–5.3)
PROT UR-MCNC: NEGATIVE — SIGNIFICANT CHANGE UP
PROTHROM AB SERPL-ACNC: 18.7 SEC — HIGH (ref 10–13.1)
RBC # BLD: 2.65 M/UL — LOW (ref 4.2–5.8)
RBC # FLD: 13 % — SIGNIFICANT CHANGE UP (ref 10.3–14.5)
RH IG SCN BLD-IMP: POSITIVE — SIGNIFICANT CHANGE UP
SODIUM SERPL-SCNC: 138 MMOL/L — SIGNIFICANT CHANGE UP (ref 135–145)
SP GR SPEC: 1.01 — LOW (ref 1.01–1.02)
SPECIMEN SOURCE: SIGNIFICANT CHANGE UP
SPECIMEN SOURCE: SIGNIFICANT CHANGE UP
UROBILINOGEN FLD QL: NEGATIVE — SIGNIFICANT CHANGE UP
WBC # BLD: 0.6 K/UL — CRITICAL LOW (ref 3.8–10.5)
WBC # FLD AUTO: 0.6 K/UL — CRITICAL LOW (ref 3.8–10.5)

## 2017-02-17 PROCEDURE — 70450 CT HEAD/BRAIN W/O DYE: CPT | Mod: 26

## 2017-02-17 PROCEDURE — 86078 PHYS BLOOD BANK SERV REACTJ: CPT

## 2017-02-17 PROCEDURE — 99233 SBSQ HOSP IP/OBS HIGH 50: CPT | Mod: GC

## 2017-02-17 PROCEDURE — 86850 RBC ANTIBODY SCREEN: CPT

## 2017-02-17 PROCEDURE — 85097 BONE MARROW INTERPRETATION: CPT

## 2017-02-17 PROCEDURE — 86813 HLA TYPING A B OR C: CPT

## 2017-02-17 PROCEDURE — 88360 TUMOR IMMUNOHISTOCHEM/MANUAL: CPT

## 2017-02-17 PROCEDURE — 88313 SPECIAL STAINS GROUP 2: CPT

## 2017-02-17 PROCEDURE — 88185 FLOWCYTOMETRY/TC ADD-ON: CPT

## 2017-02-17 PROCEDURE — 86923 COMPATIBILITY TEST ELECTRIC: CPT

## 2017-02-17 PROCEDURE — 88305 TISSUE EXAM BY PATHOLOGIST: CPT

## 2017-02-17 PROCEDURE — 86901 BLOOD TYPING SEROLOGIC RH(D): CPT

## 2017-02-17 PROCEDURE — 88184 FLOWCYTOMETRY/ TC 1 MARKER: CPT

## 2017-02-17 PROCEDURE — 86900 BLOOD TYPING SEROLOGIC ABO: CPT

## 2017-02-17 PROCEDURE — 86880 COOMBS TEST DIRECT: CPT

## 2017-02-17 RX ORDER — MAGNESIUM SULFATE 500 MG/ML
1 VIAL (ML) INJECTION ONCE
Qty: 0 | Refills: 0 | Status: COMPLETED | OUTPATIENT
Start: 2017-02-17 | End: 2017-02-17

## 2017-02-17 RX ORDER — FILGRASTIM 480MCG/1.6
480 VIAL (ML) INJECTION DAILY
Qty: 0 | Refills: 0 | Status: COMPLETED | OUTPATIENT
Start: 2017-02-17 | End: 2017-02-23

## 2017-02-17 RX ORDER — SODIUM CHLORIDE 9 MG/ML
1000 INJECTION INTRAMUSCULAR; INTRAVENOUS; SUBCUTANEOUS
Qty: 0 | Refills: 0 | Status: DISCONTINUED | OUTPATIENT
Start: 2017-02-17 | End: 2017-02-19

## 2017-02-17 RX ORDER — POTASSIUM CHLORIDE 20 MEQ
40 PACKET (EA) ORAL ONCE
Qty: 0 | Refills: 0 | Status: COMPLETED | OUTPATIENT
Start: 2017-02-17 | End: 2017-02-17

## 2017-02-17 RX ORDER — SODIUM CHLORIDE 9 MG/ML
500 INJECTION INTRAMUSCULAR; INTRAVENOUS; SUBCUTANEOUS ONCE
Qty: 0 | Refills: 0 | Status: COMPLETED | OUTPATIENT
Start: 2017-02-17 | End: 2017-02-17

## 2017-02-17 RX ORDER — VANCOMYCIN HCL 1 G
1000 VIAL (EA) INTRAVENOUS ONCE
Qty: 0 | Refills: 0 | Status: COMPLETED | OUTPATIENT
Start: 2017-02-17 | End: 2017-02-17

## 2017-02-17 RX ORDER — LORATADINE 10 MG/1
10 TABLET ORAL DAILY
Qty: 0 | Refills: 0 | Status: DISCONTINUED | OUTPATIENT
Start: 2017-02-17 | End: 2017-03-02

## 2017-02-17 RX ORDER — LORATADINE 10 MG/1
10 TABLET ORAL ONCE
Qty: 0 | Refills: 0 | Status: COMPLETED | OUTPATIENT
Start: 2017-02-17 | End: 2017-02-17

## 2017-02-17 RX ORDER — PIPERACILLIN AND TAZOBACTAM 4; .5 G/20ML; G/20ML
3.38 INJECTION, POWDER, LYOPHILIZED, FOR SOLUTION INTRAVENOUS ONCE
Qty: 0 | Refills: 0 | Status: COMPLETED | OUTPATIENT
Start: 2017-02-17 | End: 2017-02-17

## 2017-02-17 RX ORDER — PIPERACILLIN AND TAZOBACTAM 4; .5 G/20ML; G/20ML
3.38 INJECTION, POWDER, LYOPHILIZED, FOR SOLUTION INTRAVENOUS EVERY 8 HOURS
Qty: 0 | Refills: 0 | Status: DISCONTINUED | OUTPATIENT
Start: 2017-02-17 | End: 2017-02-22

## 2017-02-17 RX ADMIN — LORATADINE 10 MILLIGRAM(S): 10 TABLET ORAL at 14:22

## 2017-02-17 RX ADMIN — Medication 100 GRAM(S): at 05:58

## 2017-02-17 RX ADMIN — SODIUM CHLORIDE 1000 MILLILITER(S): 9 INJECTION INTRAMUSCULAR; INTRAVENOUS; SUBCUTANEOUS at 03:20

## 2017-02-17 RX ADMIN — PIPERACILLIN AND TAZOBACTAM 25 GRAM(S): 4; .5 INJECTION, POWDER, LYOPHILIZED, FOR SOLUTION INTRAVENOUS at 20:17

## 2017-02-17 RX ADMIN — SODIUM CHLORIDE 100 MILLILITER(S): 9 INJECTION INTRAMUSCULAR; INTRAVENOUS; SUBCUTANEOUS at 05:58

## 2017-02-17 RX ADMIN — Medication 650 MILLIGRAM(S): at 03:05

## 2017-02-17 RX ADMIN — LORATADINE 10 MILLIGRAM(S): 10 TABLET ORAL at 02:30

## 2017-02-17 RX ADMIN — Medication 650 MILLIGRAM(S): at 14:22

## 2017-02-17 RX ADMIN — FLUCONAZOLE 200 MILLIGRAM(S): 150 TABLET ORAL at 14:23

## 2017-02-17 RX ADMIN — CEFEPIME 100 MILLIGRAM(S): 1 INJECTION, POWDER, FOR SOLUTION INTRAMUSCULAR; INTRAVENOUS at 07:50

## 2017-02-17 RX ADMIN — Medication 650 MILLIGRAM(S): at 14:52

## 2017-02-17 RX ADMIN — Medication 480 MICROGRAM(S): at 14:24

## 2017-02-17 RX ADMIN — Medication 40 MILLIEQUIVALENT(S): at 03:36

## 2017-02-17 RX ADMIN — SODIUM CHLORIDE 100 MILLILITER(S): 9 INJECTION INTRAMUSCULAR; INTRAVENOUS; SUBCUTANEOUS at 21:39

## 2017-02-17 RX ADMIN — Medication 250 MILLIGRAM(S): at 05:58

## 2017-02-17 RX ADMIN — SODIUM CHLORIDE 1000 MILLILITER(S): 9 INJECTION INTRAMUSCULAR; INTRAVENOUS; SUBCUTANEOUS at 03:36

## 2017-02-17 RX ADMIN — PIPERACILLIN AND TAZOBACTAM 200 GRAM(S): 4; .5 INJECTION, POWDER, LYOPHILIZED, FOR SOLUTION INTRAVENOUS at 12:32

## 2017-02-17 NOTE — ED ADULT NURSE REASSESSMENT NOTE - NS ED NURSE REASSESS COMMENT FT1
1800- Addendum: MD Potter aware temperature of 39.4C prior to transfusion, ordered to proceed with transfusion

## 2017-02-17 NOTE — PROVIDER CONTACT NOTE (CRITICAL VALUE NOTIFICATION) - SITUATION
Pt s/p fall, provider aware. Labs drawn. Pt admitted for neutropenic fever WBC 0.6, PLT 8. No s/s of bleeding.

## 2017-02-18 ENCOUNTER — TRANSCRIPTION ENCOUNTER (OUTPATIENT)
Age: 82
End: 2017-02-18

## 2017-02-18 ENCOUNTER — APPOINTMENT (OUTPATIENT)
Dept: INFUSION THERAPY | Facility: HOSPITAL | Age: 82
End: 2017-02-18

## 2017-02-18 LAB
ANION GAP SERPL CALC-SCNC: 9 MMOL/L — SIGNIFICANT CHANGE UP (ref 5–17)
BUN SERPL-MCNC: 14 MG/DL — SIGNIFICANT CHANGE UP (ref 7–23)
CALCIUM SERPL-MCNC: 9.2 MG/DL — SIGNIFICANT CHANGE UP (ref 8.4–10.5)
CHLORIDE SERPL-SCNC: 103 MMOL/L — SIGNIFICANT CHANGE UP (ref 96–108)
CO2 SERPL-SCNC: 27 MMOL/L — SIGNIFICANT CHANGE UP (ref 22–31)
CREAT SERPL-MCNC: 0.75 MG/DL — SIGNIFICANT CHANGE UP (ref 0.5–1.3)
CULTURE RESULTS: NO GROWTH — SIGNIFICANT CHANGE UP
GLUCOSE SERPL-MCNC: 99 MG/DL — SIGNIFICANT CHANGE UP (ref 70–99)
HCT VFR BLD CALC: 19.7 % — CRITICAL LOW (ref 39–50)
HCT VFR BLD CALC: 22.2 % — LOW (ref 39–50)
HGB BLD-MCNC: 7 G/DL — CRITICAL LOW (ref 13–17)
HGB BLD-MCNC: 7.6 G/DL — LOW (ref 13–17)
MAGNESIUM SERPL-MCNC: 1.9 MG/DL — SIGNIFICANT CHANGE UP (ref 1.6–2.6)
MCHC RBC-ENTMCNC: 29.7 PG — SIGNIFICANT CHANGE UP (ref 27–34)
MCHC RBC-ENTMCNC: 30.9 PG — SIGNIFICANT CHANGE UP (ref 27–34)
MCHC RBC-ENTMCNC: 34.2 GM/DL — SIGNIFICANT CHANGE UP (ref 32–36)
MCHC RBC-ENTMCNC: 35.3 GM/DL — SIGNIFICANT CHANGE UP (ref 32–36)
MCV RBC AUTO: 86.8 FL — SIGNIFICANT CHANGE UP (ref 80–100)
MCV RBC AUTO: 87.4 FL — SIGNIFICANT CHANGE UP (ref 80–100)
PHOSPHATE SERPL-MCNC: 2.7 MG/DL — SIGNIFICANT CHANGE UP (ref 2.5–4.5)
PLATELET # BLD AUTO: 10 K/UL — CRITICAL LOW (ref 150–400)
PLATELET # BLD AUTO: 9 K/UL — CRITICAL LOW (ref 150–400)
POTASSIUM SERPL-MCNC: 3.7 MMOL/L — SIGNIFICANT CHANGE UP (ref 3.5–5.3)
POTASSIUM SERPL-SCNC: 3.7 MMOL/L — SIGNIFICANT CHANGE UP (ref 3.5–5.3)
RBC # BLD: 2.26 M/UL — LOW (ref 4.2–5.8)
RBC # BLD: 2.56 M/UL — LOW (ref 4.2–5.8)
RBC # FLD: 13.4 % — SIGNIFICANT CHANGE UP (ref 10.3–14.5)
RBC # FLD: 13.9 % — SIGNIFICANT CHANGE UP (ref 10.3–14.5)
SODIUM SERPL-SCNC: 139 MMOL/L — SIGNIFICANT CHANGE UP (ref 135–145)
SPECIMEN SOURCE: SIGNIFICANT CHANGE UP
WBC # BLD: 2 K/UL — LOW (ref 3.8–10.5)
WBC # BLD: 3 K/UL — LOW (ref 3.8–10.5)
WBC # FLD AUTO: 2 K/UL — LOW (ref 3.8–10.5)
WBC # FLD AUTO: 3 K/UL — LOW (ref 3.8–10.5)

## 2017-02-18 PROCEDURE — 99233 SBSQ HOSP IP/OBS HIGH 50: CPT | Mod: GC

## 2017-02-18 PROCEDURE — 99222 1ST HOSP IP/OBS MODERATE 55: CPT

## 2017-02-18 PROCEDURE — 74176 CT ABD & PELVIS W/O CONTRAST: CPT | Mod: 26

## 2017-02-18 RX ORDER — VANCOMYCIN HCL 1 G
VIAL (EA) INTRAVENOUS
Qty: 0 | Refills: 0 | Status: DISCONTINUED | OUTPATIENT
Start: 2017-02-18 | End: 2017-02-20

## 2017-02-18 RX ORDER — VANCOMYCIN HCL 1 G
1000 VIAL (EA) INTRAVENOUS EVERY 24 HOURS
Qty: 0 | Refills: 0 | Status: DISCONTINUED | OUTPATIENT
Start: 2017-02-19 | End: 2017-02-20

## 2017-02-18 RX ORDER — VANCOMYCIN HCL 1 G
1000 VIAL (EA) INTRAVENOUS ONCE
Qty: 0 | Refills: 0 | Status: COMPLETED | OUTPATIENT
Start: 2017-02-18 | End: 2017-02-18

## 2017-02-18 RX ADMIN — PIPERACILLIN AND TAZOBACTAM 25 GRAM(S): 4; .5 INJECTION, POWDER, LYOPHILIZED, FOR SOLUTION INTRAVENOUS at 22:30

## 2017-02-18 RX ADMIN — Medication 650 MILLIGRAM(S): at 13:33

## 2017-02-18 RX ADMIN — FLUCONAZOLE 200 MILLIGRAM(S): 150 TABLET ORAL at 11:28

## 2017-02-18 RX ADMIN — LORATADINE 10 MILLIGRAM(S): 10 TABLET ORAL at 12:31

## 2017-02-18 RX ADMIN — PIPERACILLIN AND TAZOBACTAM 25 GRAM(S): 4; .5 INJECTION, POWDER, LYOPHILIZED, FOR SOLUTION INTRAVENOUS at 05:10

## 2017-02-18 RX ADMIN — PIPERACILLIN AND TAZOBACTAM 25 GRAM(S): 4; .5 INJECTION, POWDER, LYOPHILIZED, FOR SOLUTION INTRAVENOUS at 14:57

## 2017-02-18 RX ADMIN — Medication 650 MILLIGRAM(S): at 12:30

## 2017-02-18 RX ADMIN — Medication 650 MILLIGRAM(S): at 03:00

## 2017-02-18 RX ADMIN — Medication 250 MILLIGRAM(S): at 20:19

## 2017-02-18 RX ADMIN — Medication 480 MICROGRAM(S): at 20:18

## 2017-02-18 RX ADMIN — Medication 650 MILLIGRAM(S): at 02:30

## 2017-02-18 NOTE — DISCHARGE NOTE ADULT - CARE PROVIDER_API CALL
Any Linares), Hematology; Internal Medicine; Medical Oncology  [address] [place] [address]  Phone: [phone]  Fax: [phone] Any Linares), Hematology; Internal Medicine; Medical Oncology  [address] [place] [address]  Phone: [phone]  Fax: [phone]    Sharon Meek), Infectious Disease; Internal Medicine  [address] [place] [address]  Phone: [phone]  Fax: [phone]

## 2017-02-18 NOTE — DISCHARGE NOTE ADULT - HOSPITAL COURSE
Hospital Admission:    83M PMHx of HTN ans AML on Azacitidine (Cycle 7 Vidaza) developed fever and chills during chemo at [facility] (Hematologist Dr. Sarmiento) T-max 100.7. Patient was diagnosed in 06/2016 and received chemotherapy today 2/16 - today is day 4  of cycle 7 of chemo Vidaza. Patient is on prophylactic Fluconazole and Levaquin since 10/2016.Patient denies prior fever but he has been taking Tylenol daily for a week long P-rvkef-pvwmlfp headache. Had a negative CTH five days ago. Patient endorsed rhinorrhea in the morning which is normal for him but denies cough, sore throat, SOB. Patient denies sick contact, or recent travel. No dysuria, urinary frequency or purulent discharge. No skin rashes or insect bites.  He denies constipation diarrhea, nausea, vomiting.        VS: T 102.9 HR: 119  BP:193/79  RR22 O2: 99  Lactate 2.2  Seen by Hematology  s/p x1 Vancomycin Cefepime     Hospital Course:    ID consulted. Patient initially was treated with vancomycin and zosyn. Blood cultures grew Strep mutis. Vancomycin was discontinued and zosyn was continued. Dental evaluated patient and patient has chronic periodontitis but no active dental infections. They recommended outpatient dental follow up. TTE ordered. Hospital Admission:    83M PMHx of HTN ans AML on Azacitidine (Cycle 7 Vidaza) developed fever and chills during chemo at [facility] (Hematologist Dr. Sarmiento) T-max 100.7. Patient was diagnosed in 06/2016 and received chemotherapy today 2/16 - today is day 4  of cycle 7 of chemo Vidaza. Patient is on prophylactic Fluconazole and Levaquin since 10/2016.Patient denies prior fever but he has been taking Tylenol daily for a week long F-ipedf-toobdzw headache. Had a negative CTH five days ago. Patient endorsed rhinorrhea in the morning which is normal for him but denies cough, sore throat, SOB. Patient denies sick contact, or recent travel. No dysuria, urinary frequency or purulent discharge. No skin rashes or insect bites.  He denies constipation diarrhea, nausea, vomiting.        VS: T 102.9 HR: 119  BP:193/79  RR22 O2: 99  Lactate 2.2  Seen by Hematology  s/p x1 Vancomycin Cefepime     Hospital Course:    ID consulted. Patient initially was treated with vancomycin and zosyn. Blood cultures grew Strep mutis. Vancomycin was discontinued and zosyn was continued. Dental evaluated patient and patient has chronic periodontitis but no active dental infections. They recommended outpatient dental follow up. TTE ordered. ID recommended zosyn be switched to ceftriaxone. Hospital Admission:    83M PMHx of HTN ans AML on Azacitidine (Cycle 7 Vidaza) developed fever and chills during chemo at [facility] (Hematologist Dr. Sarmiento) T-max 100.7. Patient was diagnosed in 06/2016 and received chemotherapy today 2/16 - today is day 4  of cycle 7 of chemo Vidaza. Patient is on prophylactic Fluconazole and Levaquin since 10/2016.Patient denies prior fever but he has been taking Tylenol daily for a week long N-xrudc-qkzuabh headache. Had a negative CTH five days ago. Patient endorsed rhinorrhea in the morning which is normal for him but denies cough, sore throat, SOB. Patient denies sick contact, or recent travel. No dysuria, urinary frequency or purulent discharge. No skin rashes or insect bites.  He denies constipation diarrhea, nausea, vomiting.        VS: T 102.9 HR: 119  BP:193/79  RR22 O2: 99  Lactate 2.2  Seen by Hematology  s/p x1 Vancomycin Cefepime     Hospital Course:    ID consulted. Patient initially was treated with vancomycin and zosyn. Blood cultures grew Strep mutis. Vancomycin was discontinued and zosyn was continued. Dental evaluated patient and patient has chronic periodontitis but no active dental infections. Initially, they recommended outpatient dental follow up but since patient was going to remain hospitalized, it was decided that patient would have dental extractions as an inpatient. TTE ordered. ID recommended zosyn be switched to ceftriaxone.    TTE showed:  1. No overt evidence of mitral valve vegetation. Minimal  mitral regurgitation.  2. Calcified/thickened aortic valve.  No overt evidence of  aortic valve vegetation. Peak transaortic valve gradient  equals 13 mm Hg, mean transaortic valve gradient equals 11  mm Hg, consistent with mild aortic stenosis. No aortic  valve regurgitation seen.  3. Moderately dilated left atrium.  LA volume index = 45  cc/m2.  4. Normal left ventricular internal dimensions and wall  thicknesses.  5. Normal left ventricular systolic function with an  estimated ejection fraction of 60-65%. No segmental wall  motion abnormalities.  6. Normal right ventricular size and systolic function.  7. Tricuspid valve not well visualized.  No overt evidence  of tricuspid valve vegetation.   Minimal tricuspid  regurgitation.  8. Pulmonic valve not well visualized.  No pulmonic  stenosis. Mild pulmonic regurgitation. Estimated pulmonary  artery systolic pressure equals 50 mm Hg, assuming right  atrial pressure equals 15 mm Hg, consistent with mild to  moderate pulmonary hypertension.  9. Small to moderate circumferential pericardial effusion  most prominently noted adjacent to the right atrium where  it measures approximately 18 mm (at end systole).  Mild  invagination of the right atrial wall noted in late  diastole.  The inferior vena cava is plethoric with  respiratory variation.  No significant respiratory  variation in transmitral flow. Hospital Admission:    83M PMHx of HTN ans AML on Azacitidine (Cycle 7 Vidaza) developed fever and chills during chemo at [facility] (Hematologist Dr. Sarmiento) T-max 100.7. Patient was diagnosed in 06/2016 and received chemotherapy today 2/16 - today is day 4  of cycle 7 of chemo Vidaza. Patient is on prophylactic Fluconazole and Levaquin since 10/2016.Patient denies prior fever but he has been taking Tylenol daily for a week long J-ohwbf-ienpdcs headache. Had a negative CTH five days ago. Patient endorsed rhinorrhea in the morning which is normal for him but denies cough, sore throat, SOB. Patient denies sick contact, or recent travel. No dysuria, urinary frequency or purulent discharge. No skin rashes or insect bites.  He denies constipation diarrhea, nausea, vomiting.        VS: T 102.9 HR: 119  BP:193/79  RR22 O2: 99  Lactate 2.2  Seen by Hematology  s/p x1 Vancomycin Cefepime     Hospital Course:    ID consulted. Patient initially was treated with vancomycin and zosyn. Blood cultures grew Strep mutis. Vancomycin was discontinued and zosyn was continued. Dental evaluated patient and patient has chronic periodontitis but no active dental infections. Initially, they recommended outpatient dental follow up but since patient was going to remain hospitalized, it was decided that patient would have dental extractions as an inpatient. TTE ordered. ID recommended zosyn be switched to ceftriaxone.    TTE showed:  1. No overt evidence of mitral valve vegetation. Minimal  mitral regurgitation.  2. Calcified/thickened aortic valve.  No overt evidence of  aortic valve vegetation. Peak transaortic valve gradient  equals 13 mm Hg, mean transaortic valve gradient equals 11  mm Hg, consistent with mild aortic stenosis. No aortic  valve regurgitation seen.  3. Moderately dilated left atrium.  LA volume index = 45  cc/m2.  4. Normal left ventricular internal dimensions and wall  thicknesses.  5. Normal left ventricular systolic function with an  estimated ejection fraction of 60-65%. No segmental wall  motion abnormalities.  6. Normal right ventricular size and systolic function.  7. Tricuspid valve not well visualized.  No overt evidence  of tricuspid valve vegetation.   Minimal tricuspid  regurgitation.  8. Pulmonic valve not well visualized.  No pulmonic  stenosis. Mild pulmonic regurgitation. Estimated pulmonary  artery systolic pressure equals 50 mm Hg, assuming right  atrial pressure equals 15 mm Hg, consistent with mild to  moderate pulmonary hypertension.  9. Small to moderate circumferential pericardial effusion  most prominently noted adjacent to the right atrium where  it measures approximately 18 mm (at end systole).  Mild  invagination of the right atrial wall noted in late  diastole.  The inferior vena cava is plethoric with  respiratory variation.  No significant respiratory  variation in transmitral flow.    Patient was evaluated by dental and was found to have chronic periodontitis. Patient had three teeth extracted during the admission in order to prevent further S.mitis bacteremia.     Patient is transfusion dependent, and throughout the hospitalization required transfusions when Hgb was less than 7 or platelets were less than 10 and afebrile.     On the day of discharge, patient's symptoms had improved and he was hemodynamically stable. Hospital Admission:    83M PMHx of HTN ans AML on Azacitidine (Cycle 7 Vidaza) developed fever and chills during chemo at [facility] (Hematologist Dr. Sarmiento) T-max 100.7. Patient was diagnosed in 06/2016 and received chemotherapy today 2/16 - today is day 4  of cycle 7 of chemo Vidaza. Patient is on prophylactic Fluconazole and Levaquin since 10/2016.Patient denies prior fever but he has been taking Tylenol daily for a week long M-pjbtn-hmvkkgs headache. Had a negative CTH five days ago. Patient endorsed rhinorrhea in the morning which is normal for him but denies cough, sore throat, SOB. Patient denies sick contact, or recent travel. No dysuria, urinary frequency or purulent discharge. No skin rashes or insect bites.  He denies constipation diarrhea, nausea, vomiting.        VS: T 102.9 HR: 119  BP:193/79  RR22 O2: 99  Lactate 2.2  Seen by Hematology  s/p x1 Vancomycin Cefepime     Hospital Course:    ID consulted. Patient initially was treated with vancomycin and zosyn. Blood cultures grew Strep mutis. Vancomycin was discontinued and zosyn was continued. Dental evaluated patient and patient has chronic periodontitis but no active dental infections. Initially, they recommended outpatient dental follow up but since patient was going to remain hospitalized, it was decided that patient would have dental extractions as an inpatient. TTE ordered. ID recommended zosyn be switched to ceftriaxone.    TTE showed:  1. No overt evidence of mitral valve vegetation. Minimal  mitral regurgitation.  2. Calcified/thickened aortic valve.  No overt evidence of  aortic valve vegetation. Peak transaortic valve gradient  equals 13 mm Hg, mean transaortic valve gradient equals 11  mm Hg, consistent with mild aortic stenosis. No aortic  valve regurgitation seen.  3. Moderately dilated left atrium.  LA volume index = 45  cc/m2.  4. Normal left ventricular internal dimensions and wall  thicknesses.  5. Normal left ventricular systolic function with an  estimated ejection fraction of 60-65%. No segmental wall  motion abnormalities.  6. Normal right ventricular size and systolic function.  7. Tricuspid valve not well visualized.  No overt evidence  of tricuspid valve vegetation.   Minimal tricuspid  regurgitation.  8. Pulmonic valve not well visualized.  No pulmonic  stenosis. Mild pulmonic regurgitation. Estimated pulmonary  artery systolic pressure equals 50 mm Hg, assuming right  atrial pressure equals 15 mm Hg, consistent with mild to  moderate pulmonary hypertension.  9. Small to moderate circumferential pericardial effusion  most prominently noted adjacent to the right atrium where  it measures approximately 18 mm (at end systole).  Mild  invagination of the right atrial wall noted in late  diastole.  The inferior vena cava is plethoric with  respiratory variation.  No significant respiratory  variation in transmitral flow.    Patient was evaluated by dental and was found to have chronic periodontitis. Patient had three teeth extracted during the admission in order to prevent further S.mitis bacteremia.     Patient is transfusion dependent, and throughout the hospitalization required transfusions when Hgb was less than 7 or platelets were less than 10 and afebrile.     On the day of discharge, patient's symptoms had improved and he was hemodynamically stable. Pt is to follow up with her dentist.

## 2017-02-18 NOTE — DISCHARGE NOTE ADULT - OTHER SIGNIFICANT FINDINGS
83 year old man with PMHx of hypertension, AML diagnosed 6/2016 on Cycle 7 chemo Vidaza admitted for neutropenic fever, found to be septic with blood culture positive for Streptococcus Mitis. Patient was initially started on Cefepime for neutropenic fever before switched to Zosyn after blood cultures was positive for gram positive bacteria. Infectious disease was also consulted and recommended****  Course was significant of pancytopenia with low platelets and hemoglobin complicated by a fall on the night of admission while patient was still in the ED. A right flank bruise was noted and CTAbd/Pelvis showed *******. A CT head was also done after the fall, which didn't show any acute findings. In the mean time, patient was transfused with blood and platelets to maintain hemoglobulin over 7 and platelets over 10.

## 2017-02-18 NOTE — DISCHARGE NOTE ADULT - PATIENT PORTAL LINK FT
“You can access the Follow[facility] Patient Portal, offered by [facility], by registering with the following website: [email or web address]”

## 2017-02-18 NOTE — DISCHARGE NOTE ADULT - CARE PLAN
Principal Discharge DX:	Other streptococcal sepsis  Secondary Diagnosis:	Leukemia  Secondary Diagnosis:	Pancytopenia Principal Discharge DX:	Other streptococcal sepsis  Goal:	Resolution  Instructions for follow-up, activity and diet:	When you came to the hospital, it was found that you had a bacterial infection in your blood. You were treated with IV antibiotics and improved. Please return to the ER if you have fever or any other progressive symptoms.  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 While you were in the hospital you required transfusions. You will need to follow up with your outpatient hematologist within one week of discharge.  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 Please follow up at the [facility] for routine blood work and transfusion. Principal Discharge DX:	Other streptococcal sepsis  Goal:	Resolution  Instructions for follow-up, activity and diet:	When you came to the hospital, it was found that you had a bacterial infection in your blood. You were treated with IV antibiotics and improved. You completed the antibiotic course while in the hospital. You also had three teeth removed in order to prevent this infection from happening again. Please return to the ER if you have fever or any other progressive symptoms.  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 While you were in the hospital you required transfusions. You will need to follow up with your outpatient hematologist within three to five days of discharge for routine hematology follow up at the [facility]. Please continue taking your prophylactic antibiotics Levaquin and Diflucan as prescribed.  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 Please follow up at the [facility] for routine blood work and transfusion.  Secondary Diagnosis:	Essential hypertension  Goal:	Medication Compliance  Instructions for follow-up, activity and diet:	You have a history of high blood pressure. Please continue taking your home blood pressure medication of losartan-hydrochlorathiazide as prescribed. In addition, you will need to take two new medications called amlodipine and hydralazine. A prescription was sent to [facility] for these new medications. Please  the prescriptions and take as prescribed. Please follow up with your primary care physician within two weeks of discharge. Principal Discharge DX:	Other streptococcal sepsis  Goal:	Resolution  Instructions for follow-up, activity and diet:	When you came to the hospital, it was found that you had a bacterial infection in your blood. You were treated with IV antibiotics and improved. You completed the antibiotic course while in the hospital. You also had three teeth removed in order to prevent this infection from happening again. You will need to follow up the infectious disease physician, Dr.Marcia Meek, or any of her colleagues within one week of discharge. The information for the office is listed below. Please call and make an appointment. Please return to the ER if you have fever or any other progressive symptoms.  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 While you were in the hospital you required transfusions. You will need to follow up with your outpatient hematologist within three to five days of discharge for routine hematology follow up at the [facility]. Please continue taking your prophylactic antibiotics Levaquin and Diflucan as prescribed.  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 Please follow up at the [facility] for routine blood work and transfusion.  Secondary Diagnosis:	Essential hypertension  Goal:	Medication Compliance  Instructions for follow-up, activity and diet:	You have a history of high blood pressure. Please continue taking your home blood pressure medication of losartan-hydrochlorathiazide as prescribed. In addition, you will need to take two new medications called amlodipine and hydralazine. A prescription was sent to [facility] for these new medications. Please  the prescriptions and take as prescribed. Please follow up with your primary care physician within two weeks of discharge. Principal Discharge DX:	Other streptococcal sepsis  Goal:	Resolution  Instructions for follow-up, activity and diet:	When you came to the hospital, it was found that you had a bacterial infection in your blood. You were treated with IV antibiotics and improved. You completed the antibiotic course while in the hospital. You also had three teeth removed in order to prevent this infection from happening again. You will need to follow up the infectious disease physician, Dr.Marcia Meek, or any of her colleagues within one week of discharge. The information for the office is listed below. Please call and make an appointment. Please return to the ER if you have fever or any other progressive symptoms.  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 While you were in the hospital you required transfusions. You will need to follow up with your outpatient hematologist within three to five days of discharge for routine hematology follow up at the [facility]. Please continue taking your prophylactic antibiotics Levaquin and Diflucan as prescribed.  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 Please follow up at the [facility] for routine blood work and transfusion.  Secondary Diagnosis:	Essential hypertension  Goal:	Medication Compliance  Instructions for follow-up, activity and diet:	You have a history of high blood pressure. Please continue taking your home blood pressure medication of losartan-hydrochlorathiazide as prescribed. In addition, you will need to take two new medications called amlodipine and hydralazine. A prescription was sent to [facility] for these new medications. Please  the prescriptions and take as prescribed. Please follow up with your primary care physician within two weeks of discharge. Principal Discharge DX:	Other streptococcal sepsis  Goal:	Resolution  Instructions for follow-up, activity and diet:	When you came to the hospital, it was found that you had a bacterial infection in your blood. You were treated with IV antibiotics and improved. You completed the antibiotic course while in the hospital. You also had three teeth removed in order to prevent this infection from happening again. You will need to follow up the infectious disease physician, Dr.Marcia Meek, or any of her colleagues within one week of discharge. The information for the office is listed below. Please call and make an appointment. Please return to the ER if you have fever or any other progressive symptoms.  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 While you were in the hospital you required transfusions. You will need to follow up with your outpatient hematologist within three to five days of discharge for routine hematology follow up at the [facility]. Please continue taking your prophylactic antibiotics Levaquin and Diflucan as prescribed.  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 Please follow up at the [facility] for routine blood work and transfusion.  Secondary Diagnosis:	Essential hypertension  Goal:	Medication Compliance  Instructions for follow-up, activity and diet:	You have a history of high blood pressure. Please continue taking your home blood pressure medication of losartan-hydrochlorathiazide as prescribed. In addition, you will need to take two new medications called amlodipine and hydralazine. A prescription was sent to [facility] for these new medications. Please  the prescriptions and take as prescribed. Please follow up with your primary care physician within two weeks of discharge. Principal Discharge DX:	Other streptococcal sepsis  Goal:	Resolution  Instructions for follow-up, activity and diet:	When you came to the hospital, it was found that you had a bacterial infection in your blood. You were treated with IV antibiotics and improved. You completed the antibiotic course while in the hospital. You also had three teeth removed in order to prevent this infection from happening again. You will need to follow up the infectious disease physician, Dr.Marcia Meek, or any of her colleagues within one week of discharge. The information for the office is listed below. Please call and make an appointment. Please return to the ER if you have fever or any other progressive symptoms.  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 While you were in the hospital you required transfusions. You will need to follow up with your outpatient hematologist within three to five days of discharge for routine hematology follow up at the [facility]. Please continue taking your prophylactic antibiotics Levaquin and Diflucan as prescribed.  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 Please follow up at the [facility] for routine blood work and transfusion.  Secondary Diagnosis:	Essential hypertension  Goal:	Medication Compliance  Instructions for follow-up, activity and diet:	You have a history of high blood pressure. Please continue taking your home blood pressure medication of losartan-hydrochlorathiazide as prescribed. In addition, you will need to take two new medications called amlodipine and hydralazine. A prescription was sent to [facility] for these new medications. Please  the prescriptions and take as prescribed. Please follow up with your primary care physician within two weeks of discharge. Principal Discharge DX:	Other streptococcal sepsis  Goal:	Resolution  Instructions for follow-up, activity and diet:	When you came to the hospital, it was found that you had a bacterial infection in your blood. You were treated with IV antibiotics and improved. You completed the antibiotic course while in the hospital. You also had three teeth removed in order to prevent this infection from happening again. You will need to follow up the infectious disease physician, Dr.Marcia Meek, or any of her colleagues within one week of discharge. The information for the office is listed below. Please call and make an appointment. Please return to the ER if you have fever or any other progressive symptoms.  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 While you were in the hospital you required transfusions. You will need to follow up with your outpatient hematologist within three to five days of discharge for routine hematology follow up at the [facility]. Please continue taking your prophylactic antibiotics Levaquin and Diflucan as prescribed.  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 Please follow up at the [facility] for routine blood work and transfusion.  Secondary Diagnosis:	Essential hypertension  Goal:	Medication Compliance  Instructions for follow-up, activity and diet:	You have a history of high blood pressure. Please continue taking your home blood pressure medication of losartan-hydrochlorathiazide as prescribed. In addition, you will need to take two new medications called amlodipine and hydralazine. A prescription was sent to [facility] for these new medications. Please  the prescriptions and take as prescribed. Please follow up with your primary care physician within two weeks of discharge. Principal Discharge DX:	Other streptococcal sepsis  Goal:	Resolution  Instructions for follow-up, activity and diet:	When you came to the hospital, it was found that you had a bacterial infection in your blood. You were treated with IV antibiotics and improved. You completed the antibiotic course while in the hospital. You also had three teeth removed in order to prevent this infection from happening again. You will need to follow up the infectious disease physician, Dr.Marcia Meek, or any of her colleagues within one week of discharge. The information for the office is listed below. Please call and make an appointment. Please return to the ER if you have fever or any other progressive symptoms.  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 While you were in the hospital you required transfusions. You will need to follow up with your outpatient hematologist within three to five days of discharge for routine hematology follow up at the [facility]. Please continue taking your prophylactic antibiotics Levaquin and Diflucan as prescribed.  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 Please follow up at the [facility] for routine blood work and transfusion.  Secondary Diagnosis:	Essential hypertension  Goal:	Medication Compliance  Instructions for follow-up, activity and diet:	You have a history of high blood pressure. Please continue taking your home blood pressure medication of losartan-hydrochlorathiazide as prescribed. In addition, you will need to take two new medications called amlodipine and hydralazine. A prescription was sent to [facility] for these new medications. Please  the prescriptions and take as prescribed. Please follow up with your primary care physician within two weeks of discharge.  Secondary Diagnosis:	S/P tooth extraction  Instructions for follow-up, activity and diet:	Please follow up with your dentist within a week. Please rinse your mouth with warm salt water. If you start to bleed and it does not stop, please return to the hospital immediately.

## 2017-02-18 NOTE — DISCHARGE NOTE ADULT - MEDICATION SUMMARY - MEDICATIONS TO STOP TAKING
I will STOP taking the medications listed below when I get home from the hospital:    doxazosin 8 mg oral tablet  -- 1 tab(s) by mouth once a day

## 2017-02-18 NOTE — DISCHARGE NOTE ADULT - MEDICATION SUMMARY - MEDICATIONS TO TAKE
I will START or STAY ON the medications listed below when I get home from the hospital:    ondansetron 4 mg oral tablet  -- 1 tab(s) by mouth every 8 hours, As Needed  -- Indication: For Nausea    fluconazole 200 mg oral tablet  -- 1 tab(s) by mouth once a day  -- Indication: For Prophylaxis    losartan-hydroCHLOROthiazide 100mg-25mg oral tablet  -- 1 tab(s) by mouth once a day  -- Indication: For Hypertension    amLODIPine 5 mg oral tablet  -- 1 tab(s) by mouth once a day  -- Indication: For Hypertension    levoFLOXacin 500 mg oral tablet  -- 1 tab(s) by mouth every 24 hours  -- Indication: For Prophylaxis    hydrALAZINE 25 mg oral tablet  -- 1 tab(s) by mouth 3 times a day  -- Indication: For Hypertension I will START or STAY ON the medications listed below when I get home from the hospital:    oxyCODONE 5 mg oral tablet  -- 1 tab(s) by mouth every 6 hours, As needed, severe Pain MDD:4 tablets  -- Indication: For Pain    ondansetron 4 mg oral tablet  -- 1 tab(s) by mouth every 8 hours, As Needed  -- Indication: For Nausea    fluconazole 200 mg oral tablet  -- 1 tab(s) by mouth once a day  -- Indication: For Prophylaxis    losartan-hydroCHLOROthiazide 100mg-25mg oral tablet  -- 1 tab(s) by mouth once a day  -- Indication: For Hypertension    amLODIPine 5 mg oral tablet  -- 1 tab(s) by mouth once a day  -- Indication: For Hypertension    levoFLOXacin 500 mg oral tablet  -- 1 tab(s) by mouth every 24 hours  -- Indication: For Prophylaxis    hydrALAZINE 25 mg oral tablet  -- 1 tab(s) by mouth 3 times a day  -- Indication: For Hypertension

## 2017-02-18 NOTE — DISCHARGE NOTE ADULT - CARE PROVIDERS DIRECT ADDRESSES
[email or web address],DirectAddress_Unknown

## 2017-02-18 NOTE — DISCHARGE NOTE ADULT - PLAN OF CARE
Resolution When you came to the hospital, it was found that you had a bacterial infection in your blood. You were treated with IV antibiotics and improved. Please return to the ER if you have fever or any other progressive symptoms. 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 While you were in the hospital you required transfusions. You will need to follow up with your outpatient hematologist within one week of discharge. 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 Please follow up at the [facility] for routine blood work and transfusion. Medication Compliance You have a history of high blood pressure. Please continue taking your home blood pressure medication of losartan-hydrochlorathiazide as prescribed. In addition, you will need to take two new medications called amlodipine and hydralazine. A prescription was sent to [facility] for these new medications. Please  the prescriptions and take as prescribed. Please follow up with your primary care physician within two weeks of discharge. When you came to the hospital, it was found that you had a bacterial infection in your blood. You were treated with IV antibiotics and improved. You completed the antibiotic course while in the hospital. You also had three teeth removed in order to prevent this infection from happening again. Please return to the ER if you have fever or any other progressive symptoms. 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 While you were in the hospital you required transfusions. You will need to follow up with your outpatient hematologist within three to five days of discharge for routine hematology follow up at the [facility]. Please continue taking your prophylactic antibiotics Levaquin and Diflucan as prescribed. When you came to the hospital, it was found that you had a bacterial infection in your blood. You were treated with IV antibiotics and improved. You completed the antibiotic course while in the hospital. You also had three teeth removed in order to prevent this infection from happening again. You will need to follow up the infectious disease physician, Dr.Marcia Meek, or any of her colleagues within one week of discharge. The information for the office is listed below. Please call and make an appointment. Please return to the ER if you have fever or any other progressive symptoms. Please follow up with your dentist within a week. Please rinse your mouth with warm salt water. If you start to bleed and it does not stop, please return to the hospital immediately.

## 2017-02-19 LAB
-  CEFTRIAXONE: SIGNIFICANT CHANGE UP
-  CLINDAMYCIN: SIGNIFICANT CHANGE UP
-  ERYTHROMYCIN: SIGNIFICANT CHANGE UP
-  PENICILLIN: SIGNIFICANT CHANGE UP
-  VANCOMYCIN: SIGNIFICANT CHANGE UP
ANION GAP SERPL CALC-SCNC: 10 MMOL/L — SIGNIFICANT CHANGE UP (ref 5–17)
BLD GP AB SCN SERPL QL: NEGATIVE — SIGNIFICANT CHANGE UP
BUN SERPL-MCNC: 13 MG/DL — SIGNIFICANT CHANGE UP (ref 7–23)
CALCIUM SERPL-MCNC: 9.2 MG/DL — SIGNIFICANT CHANGE UP (ref 8.4–10.5)
CHLORIDE SERPL-SCNC: 102 MMOL/L — SIGNIFICANT CHANGE UP (ref 96–108)
CO2 SERPL-SCNC: 27 MMOL/L — SIGNIFICANT CHANGE UP (ref 22–31)
CREAT SERPL-MCNC: 0.65 MG/DL — SIGNIFICANT CHANGE UP (ref 0.5–1.3)
CULTURE RESULTS: SIGNIFICANT CHANGE UP
CULTURE RESULTS: SIGNIFICANT CHANGE UP
GLUCOSE SERPL-MCNC: 93 MG/DL — SIGNIFICANT CHANGE UP (ref 70–99)
HCT VFR BLD CALC: 22.1 % — LOW (ref 39–50)
HGB BLD-MCNC: 8 G/DL — LOW (ref 13–17)
MAGNESIUM SERPL-MCNC: 1.7 MG/DL — SIGNIFICANT CHANGE UP (ref 1.6–2.6)
MCHC RBC-ENTMCNC: 31 PG — SIGNIFICANT CHANGE UP (ref 27–34)
MCHC RBC-ENTMCNC: 36.3 GM/DL — HIGH (ref 32–36)
MCV RBC AUTO: 85.5 FL — SIGNIFICANT CHANGE UP (ref 80–100)
METHOD TYPE: SIGNIFICANT CHANGE UP
METHOD TYPE: SIGNIFICANT CHANGE UP
ORGANISM # SPEC MICROSCOPIC CNT: SIGNIFICANT CHANGE UP
PHOSPHATE SERPL-MCNC: 2.6 MG/DL — SIGNIFICANT CHANGE UP (ref 2.5–4.5)
PLATELET # BLD AUTO: 9 K/UL — CRITICAL LOW (ref 150–400)
POTASSIUM SERPL-MCNC: 3.6 MMOL/L — SIGNIFICANT CHANGE UP (ref 3.5–5.3)
POTASSIUM SERPL-SCNC: 3.6 MMOL/L — SIGNIFICANT CHANGE UP (ref 3.5–5.3)
RBC # BLD: 2.59 M/UL — LOW (ref 4.2–5.8)
RBC # FLD: 14.4 % — SIGNIFICANT CHANGE UP (ref 10.3–14.5)
RH IG SCN BLD-IMP: POSITIVE — SIGNIFICANT CHANGE UP
SODIUM SERPL-SCNC: 139 MMOL/L — SIGNIFICANT CHANGE UP (ref 135–145)
SPECIMEN SOURCE: SIGNIFICANT CHANGE UP
SPECIMEN SOURCE: SIGNIFICANT CHANGE UP
WBC # BLD: 3 K/UL — LOW (ref 3.8–10.5)
WBC # FLD AUTO: 3 K/UL — LOW (ref 3.8–10.5)

## 2017-02-19 PROCEDURE — 71010: CPT | Mod: 26

## 2017-02-19 PROCEDURE — 99233 SBSQ HOSP IP/OBS HIGH 50: CPT | Mod: GC

## 2017-02-19 RX ADMIN — Medication 250 MILLIGRAM(S): at 18:01

## 2017-02-19 RX ADMIN — PIPERACILLIN AND TAZOBACTAM 25 GRAM(S): 4; .5 INJECTION, POWDER, LYOPHILIZED, FOR SOLUTION INTRAVENOUS at 14:25

## 2017-02-19 RX ADMIN — PIPERACILLIN AND TAZOBACTAM 25 GRAM(S): 4; .5 INJECTION, POWDER, LYOPHILIZED, FOR SOLUTION INTRAVENOUS at 22:45

## 2017-02-19 RX ADMIN — LORATADINE 10 MILLIGRAM(S): 10 TABLET ORAL at 09:52

## 2017-02-19 RX ADMIN — FLUCONAZOLE 200 MILLIGRAM(S): 150 TABLET ORAL at 14:25

## 2017-02-19 RX ADMIN — Medication 480 MICROGRAM(S): at 14:24

## 2017-02-19 RX ADMIN — Medication 650 MILLIGRAM(S): at 09:52

## 2017-02-19 RX ADMIN — Medication 650 MILLIGRAM(S): at 10:00

## 2017-02-19 RX ADMIN — PIPERACILLIN AND TAZOBACTAM 25 GRAM(S): 4; .5 INJECTION, POWDER, LYOPHILIZED, FOR SOLUTION INTRAVENOUS at 06:00

## 2017-02-19 NOTE — PHYSICAL THERAPY INITIAL EVALUATION ADULT - PERTINENT HX OF CURRENT PROBLEM, REHAB EVAL
83yoM recent dx of AML, p/w fever and chill during chemo, found to have sepsis and neutropenic fever. 2/16/17 ECG sinus tachy

## 2017-02-19 NOTE — PHYSICAL THERAPY INITIAL EVALUATION ADULT - ADDITIONAL COMMENTS
as per pt and daughter, pt resides in a  with son, no HHA, 3 KRISTINE no railing. pt stays on 1st floor, + shower chair. PTA, amb I with SC or rollator when needed, amb I sometimes, I with ADLs, 3 children takes turn to provide the assist as needed

## 2017-02-19 NOTE — PHYSICAL THERAPY INITIAL EVALUATION ADULT - DISCHARGE DISPOSITION, PT EVAL
home w/ home PT/Home with Home PT for strengthening, bed mob, transfer, gait and balance training/home/home w/ assist

## 2017-02-20 LAB
ANION GAP SERPL CALC-SCNC: 9 MMOL/L — SIGNIFICANT CHANGE UP (ref 5–17)
BUN SERPL-MCNC: 12 MG/DL — SIGNIFICANT CHANGE UP (ref 7–23)
CALCIUM SERPL-MCNC: 9.7 MG/DL — SIGNIFICANT CHANGE UP (ref 8.4–10.5)
CHLORIDE SERPL-SCNC: 102 MMOL/L — SIGNIFICANT CHANGE UP (ref 96–108)
CO2 SERPL-SCNC: 29 MMOL/L — SIGNIFICANT CHANGE UP (ref 22–31)
CREAT SERPL-MCNC: 0.72 MG/DL — SIGNIFICANT CHANGE UP (ref 0.5–1.3)
GLUCOSE SERPL-MCNC: 95 MG/DL — SIGNIFICANT CHANGE UP (ref 70–99)
HCT VFR BLD CALC: 21.9 % — LOW (ref 39–50)
HCT VFR BLD CALC: 22.5 % — LOW (ref 39–50)
HGB BLD-MCNC: 7.5 G/DL — LOW (ref 13–17)
HGB BLD-MCNC: 7.6 G/DL — LOW (ref 13–17)
MAGNESIUM SERPL-MCNC: 1.7 MG/DL — SIGNIFICANT CHANGE UP (ref 1.6–2.6)
MCHC RBC-ENTMCNC: 29.4 PG — SIGNIFICANT CHANGE UP (ref 27–34)
MCHC RBC-ENTMCNC: 29.8 PG — SIGNIFICANT CHANGE UP (ref 27–34)
MCHC RBC-ENTMCNC: 33.8 GM/DL — SIGNIFICANT CHANGE UP (ref 32–36)
MCHC RBC-ENTMCNC: 34.1 GM/DL — SIGNIFICANT CHANGE UP (ref 32–36)
MCV RBC AUTO: 87.1 FL — SIGNIFICANT CHANGE UP (ref 80–100)
MCV RBC AUTO: 87.3 FL — SIGNIFICANT CHANGE UP (ref 80–100)
PHOSPHATE SERPL-MCNC: 3.1 MG/DL — SIGNIFICANT CHANGE UP (ref 2.5–4.5)
PLATELET # BLD AUTO: 17 K/UL — CRITICAL LOW (ref 150–400)
PLATELET # BLD AUTO: 8 K/UL — CRITICAL LOW (ref 150–400)
POTASSIUM SERPL-MCNC: 3.7 MMOL/L — SIGNIFICANT CHANGE UP (ref 3.5–5.3)
POTASSIUM SERPL-SCNC: 3.7 MMOL/L — SIGNIFICANT CHANGE UP (ref 3.5–5.3)
RBC # BLD: 2.51 M/UL — LOW (ref 4.2–5.8)
RBC # BLD: 2.59 M/UL — LOW (ref 4.2–5.8)
RBC # FLD: 13.8 % — SIGNIFICANT CHANGE UP (ref 10.3–14.5)
RBC # FLD: 13.9 % — SIGNIFICANT CHANGE UP (ref 10.3–14.5)
SODIUM SERPL-SCNC: 140 MMOL/L — SIGNIFICANT CHANGE UP (ref 135–145)
WBC # BLD: 2 K/UL — LOW (ref 3.8–10.5)
WBC # BLD: 2.4 K/UL — LOW (ref 3.8–10.5)
WBC # FLD AUTO: 2 K/UL — LOW (ref 3.8–10.5)
WBC # FLD AUTO: 2.4 K/UL — LOW (ref 3.8–10.5)

## 2017-02-20 PROCEDURE — 99232 SBSQ HOSP IP/OBS MODERATE 35: CPT

## 2017-02-20 PROCEDURE — 99233 SBSQ HOSP IP/OBS HIGH 50: CPT | Mod: GC

## 2017-02-20 RX ORDER — SENNA PLUS 8.6 MG/1
2 TABLET ORAL AT BEDTIME
Qty: 0 | Refills: 0 | Status: DISCONTINUED | OUTPATIENT
Start: 2017-02-20 | End: 2017-03-02

## 2017-02-20 RX ORDER — POLYETHYLENE GLYCOL 3350 17 G/17G
17 POWDER, FOR SOLUTION ORAL DAILY
Qty: 0 | Refills: 0 | Status: DISCONTINUED | OUTPATIENT
Start: 2017-02-20 | End: 2017-02-23

## 2017-02-20 RX ORDER — DOCUSATE SODIUM 100 MG
100 CAPSULE ORAL THREE TIMES A DAY
Qty: 0 | Refills: 0 | Status: DISCONTINUED | OUTPATIENT
Start: 2017-02-20 | End: 2017-03-02

## 2017-02-20 RX ORDER — ACYCLOVIR SODIUM 500 MG
400 VIAL (EA) INTRAVENOUS EVERY 8 HOURS
Qty: 0 | Refills: 0 | Status: DISCONTINUED | OUTPATIENT
Start: 2017-02-20 | End: 2017-03-02

## 2017-02-20 RX ADMIN — Medication 400 MILLIGRAM(S): at 22:13

## 2017-02-20 RX ADMIN — LORATADINE 10 MILLIGRAM(S): 10 TABLET ORAL at 09:21

## 2017-02-20 RX ADMIN — Medication 100 MILLIGRAM(S): at 12:05

## 2017-02-20 RX ADMIN — Medication 650 MILLIGRAM(S): at 10:33

## 2017-02-20 RX ADMIN — Medication 100 MILLIGRAM(S): at 22:13

## 2017-02-20 RX ADMIN — PIPERACILLIN AND TAZOBACTAM 25 GRAM(S): 4; .5 INJECTION, POWDER, LYOPHILIZED, FOR SOLUTION INTRAVENOUS at 06:38

## 2017-02-20 RX ADMIN — Medication 650 MILLIGRAM(S): at 09:22

## 2017-02-20 RX ADMIN — PIPERACILLIN AND TAZOBACTAM 25 GRAM(S): 4; .5 INJECTION, POWDER, LYOPHILIZED, FOR SOLUTION INTRAVENOUS at 22:10

## 2017-02-20 RX ADMIN — Medication 480 MICROGRAM(S): at 12:04

## 2017-02-20 RX ADMIN — PIPERACILLIN AND TAZOBACTAM 25 GRAM(S): 4; .5 INJECTION, POWDER, LYOPHILIZED, FOR SOLUTION INTRAVENOUS at 13:48

## 2017-02-20 RX ADMIN — SENNA PLUS 2 TABLET(S): 8.6 TABLET ORAL at 22:13

## 2017-02-20 RX ADMIN — POLYETHYLENE GLYCOL 3350 17 GRAM(S): 17 POWDER, FOR SOLUTION ORAL at 12:05

## 2017-02-20 RX ADMIN — FLUCONAZOLE 200 MILLIGRAM(S): 150 TABLET ORAL at 12:05

## 2017-02-21 ENCOUNTER — APPOINTMENT (OUTPATIENT)
Dept: INFUSION THERAPY | Facility: HOSPITAL | Age: 82
End: 2017-02-21

## 2017-02-21 LAB
ANION GAP SERPL CALC-SCNC: 9 MMOL/L — SIGNIFICANT CHANGE UP (ref 5–17)
BASOPHILS # BLD AUTO: 0 K/UL — SIGNIFICANT CHANGE UP (ref 0–0.2)
BASOPHILS NFR BLD AUTO: 0.4 % — SIGNIFICANT CHANGE UP (ref 0–2)
BUN SERPL-MCNC: 13 MG/DL — SIGNIFICANT CHANGE UP (ref 7–23)
CALCIUM SERPL-MCNC: 9.9 MG/DL — SIGNIFICANT CHANGE UP (ref 8.4–10.5)
CHLORIDE SERPL-SCNC: 102 MMOL/L — SIGNIFICANT CHANGE UP (ref 96–108)
CO2 SERPL-SCNC: 29 MMOL/L — SIGNIFICANT CHANGE UP (ref 22–31)
CREAT SERPL-MCNC: 0.63 MG/DL — SIGNIFICANT CHANGE UP (ref 0.5–1.3)
EOSINOPHIL # BLD AUTO: 0 K/UL — SIGNIFICANT CHANGE UP (ref 0–0.5)
EOSINOPHIL NFR BLD AUTO: 0.4 % — SIGNIFICANT CHANGE UP (ref 0–6)
GLUCOSE SERPL-MCNC: 100 MG/DL — HIGH (ref 70–99)
HCT VFR BLD CALC: 21.5 % — LOW (ref 39–50)
HGB BLD-MCNC: 7.4 G/DL — LOW (ref 13–17)
LYMPHOCYTES # BLD AUTO: 1.1 K/UL — SIGNIFICANT CHANGE UP (ref 1–3.3)
LYMPHOCYTES # BLD AUTO: 42.5 % — SIGNIFICANT CHANGE UP (ref 13–44)
MAGNESIUM SERPL-MCNC: 2.5 MG/DL — SIGNIFICANT CHANGE UP (ref 1.6–2.6)
MCHC RBC-ENTMCNC: 30 PG — SIGNIFICANT CHANGE UP (ref 27–34)
MCHC RBC-ENTMCNC: 34.5 GM/DL — SIGNIFICANT CHANGE UP (ref 32–36)
MCV RBC AUTO: 86.7 FL — SIGNIFICANT CHANGE UP (ref 80–100)
MONOCYTES # BLD AUTO: 0 K/UL — SIGNIFICANT CHANGE UP (ref 0–0.9)
MONOCYTES NFR BLD AUTO: 0.6 % — LOW (ref 2–14)
NEUTROPHILS # BLD AUTO: 1.4 K/UL — LOW (ref 1.8–7.4)
NEUTROPHILS NFR BLD AUTO: 56.2 % — SIGNIFICANT CHANGE UP (ref 43–77)
PHOSPHATE SERPL-MCNC: 3.3 MG/DL — SIGNIFICANT CHANGE UP (ref 2.5–4.5)
PLATELET # BLD AUTO: 16 K/UL — CRITICAL LOW (ref 150–400)
POTASSIUM SERPL-MCNC: 3.8 MMOL/L — SIGNIFICANT CHANGE UP (ref 3.5–5.3)
POTASSIUM SERPL-SCNC: 3.8 MMOL/L — SIGNIFICANT CHANGE UP (ref 3.5–5.3)
RBC # BLD: 2.48 M/UL — LOW (ref 4.2–5.8)
RBC # FLD: 14.3 % — SIGNIFICANT CHANGE UP (ref 10.3–14.5)
SODIUM SERPL-SCNC: 140 MMOL/L — SIGNIFICANT CHANGE UP (ref 135–145)
WBC # BLD: 2.6 K/UL — LOW (ref 3.8–10.5)
WBC # FLD AUTO: 2.6 K/UL — LOW (ref 3.8–10.5)

## 2017-02-21 PROCEDURE — 99233 SBSQ HOSP IP/OBS HIGH 50: CPT | Mod: GC

## 2017-02-21 RX ORDER — LOSARTAN POTASSIUM 100 MG/1
100 TABLET, FILM COATED ORAL DAILY
Qty: 0 | Refills: 0 | Status: DISCONTINUED | OUTPATIENT
Start: 2017-02-21 | End: 2017-03-02

## 2017-02-21 RX ADMIN — FLUCONAZOLE 200 MILLIGRAM(S): 150 TABLET ORAL at 12:35

## 2017-02-21 RX ADMIN — Medication 650 MILLIGRAM(S): at 23:10

## 2017-02-21 RX ADMIN — Medication 480 MICROGRAM(S): at 12:35

## 2017-02-21 RX ADMIN — Medication 400 MILLIGRAM(S): at 12:35

## 2017-02-21 RX ADMIN — Medication 400 MILLIGRAM(S): at 21:37

## 2017-02-21 RX ADMIN — Medication 650 MILLIGRAM(S): at 22:32

## 2017-02-21 RX ADMIN — PIPERACILLIN AND TAZOBACTAM 25 GRAM(S): 4; .5 INJECTION, POWDER, LYOPHILIZED, FOR SOLUTION INTRAVENOUS at 21:37

## 2017-02-21 RX ADMIN — LOSARTAN POTASSIUM 100 MILLIGRAM(S): 100 TABLET, FILM COATED ORAL at 12:35

## 2017-02-21 RX ADMIN — SENNA PLUS 2 TABLET(S): 8.6 TABLET ORAL at 21:37

## 2017-02-21 RX ADMIN — Medication 100 MILLIGRAM(S): at 21:37

## 2017-02-21 RX ADMIN — PIPERACILLIN AND TAZOBACTAM 25 GRAM(S): 4; .5 INJECTION, POWDER, LYOPHILIZED, FOR SOLUTION INTRAVENOUS at 06:23

## 2017-02-21 RX ADMIN — Medication 400 MILLIGRAM(S): at 06:23

## 2017-02-21 RX ADMIN — Medication 100 MILLIGRAM(S): at 06:23

## 2017-02-21 RX ADMIN — PIPERACILLIN AND TAZOBACTAM 25 GRAM(S): 4; .5 INJECTION, POWDER, LYOPHILIZED, FOR SOLUTION INTRAVENOUS at 13:55

## 2017-02-21 RX ADMIN — Medication 100 MILLIGRAM(S): at 12:35

## 2017-02-22 LAB
ANION GAP SERPL CALC-SCNC: 7 MMOL/L — SIGNIFICANT CHANGE UP (ref 5–17)
BLD GP AB SCN SERPL QL: NEGATIVE — SIGNIFICANT CHANGE UP
BUN SERPL-MCNC: 12 MG/DL — SIGNIFICANT CHANGE UP (ref 7–23)
CALCIUM SERPL-MCNC: 9.5 MG/DL — SIGNIFICANT CHANGE UP (ref 8.4–10.5)
CHLORIDE SERPL-SCNC: 104 MMOL/L — SIGNIFICANT CHANGE UP (ref 96–108)
CO2 SERPL-SCNC: 32 MMOL/L — HIGH (ref 22–31)
CREAT SERPL-MCNC: 0.67 MG/DL — SIGNIFICANT CHANGE UP (ref 0.5–1.3)
GLUCOSE SERPL-MCNC: 95 MG/DL — SIGNIFICANT CHANGE UP (ref 70–99)
HCT VFR BLD CALC: 22.9 % — LOW (ref 39–50)
HGB BLD-MCNC: 7.8 G/DL — LOW (ref 13–17)
MAGNESIUM SERPL-MCNC: 2.5 MG/DL — SIGNIFICANT CHANGE UP (ref 1.6–2.6)
MCHC RBC-ENTMCNC: 29.8 PG — SIGNIFICANT CHANGE UP (ref 27–34)
MCHC RBC-ENTMCNC: 33.9 GM/DL — SIGNIFICANT CHANGE UP (ref 32–36)
MCV RBC AUTO: 87.6 FL — SIGNIFICANT CHANGE UP (ref 80–100)
PHOSPHATE SERPL-MCNC: 3.5 MG/DL — SIGNIFICANT CHANGE UP (ref 2.5–4.5)
PLATELET # BLD AUTO: 12 K/UL — CRITICAL LOW (ref 150–400)
POTASSIUM SERPL-MCNC: 3.9 MMOL/L — SIGNIFICANT CHANGE UP (ref 3.5–5.3)
POTASSIUM SERPL-SCNC: 3.9 MMOL/L — SIGNIFICANT CHANGE UP (ref 3.5–5.3)
RBC # BLD: 2.62 M/UL — LOW (ref 4.2–5.8)
RBC # FLD: 13.8 % — SIGNIFICANT CHANGE UP (ref 10.3–14.5)
RH IG SCN BLD-IMP: POSITIVE — SIGNIFICANT CHANGE UP
SODIUM SERPL-SCNC: 143 MMOL/L — SIGNIFICANT CHANGE UP (ref 135–145)
WBC # BLD: 3 K/UL — LOW (ref 3.8–10.5)
WBC # FLD AUTO: 3 K/UL — LOW (ref 3.8–10.5)

## 2017-02-22 PROCEDURE — 99232 SBSQ HOSP IP/OBS MODERATE 35: CPT

## 2017-02-22 PROCEDURE — 99233 SBSQ HOSP IP/OBS HIGH 50: CPT | Mod: GC

## 2017-02-22 PROCEDURE — 99232 SBSQ HOSP IP/OBS MODERATE 35: CPT | Mod: GC

## 2017-02-22 PROCEDURE — 93306 TTE W/DOPPLER COMPLETE: CPT | Mod: 26

## 2017-02-22 RX ORDER — CEFTRIAXONE 500 MG/1
2 INJECTION, POWDER, FOR SOLUTION INTRAMUSCULAR; INTRAVENOUS ONCE
Qty: 0 | Refills: 0 | Status: COMPLETED | OUTPATIENT
Start: 2017-02-22 | End: 2017-02-22

## 2017-02-22 RX ORDER — CEFTRIAXONE 500 MG/1
2 INJECTION, POWDER, FOR SOLUTION INTRAMUSCULAR; INTRAVENOUS EVERY 24 HOURS
Qty: 0 | Refills: 0 | Status: DISCONTINUED | OUTPATIENT
Start: 2017-02-23 | End: 2017-03-02

## 2017-02-22 RX ORDER — CEFTRIAXONE 500 MG/1
INJECTION, POWDER, FOR SOLUTION INTRAMUSCULAR; INTRAVENOUS
Qty: 0 | Refills: 0 | Status: DISCONTINUED | OUTPATIENT
Start: 2017-02-22 | End: 2017-03-02

## 2017-02-22 RX ADMIN — Medication 400 MILLIGRAM(S): at 21:46

## 2017-02-22 RX ADMIN — Medication 100 MILLIGRAM(S): at 21:46

## 2017-02-22 RX ADMIN — Medication 100 MILLIGRAM(S): at 06:39

## 2017-02-22 RX ADMIN — PIPERACILLIN AND TAZOBACTAM 25 GRAM(S): 4; .5 INJECTION, POWDER, LYOPHILIZED, FOR SOLUTION INTRAVENOUS at 06:39

## 2017-02-22 RX ADMIN — SENNA PLUS 2 TABLET(S): 8.6 TABLET ORAL at 21:46

## 2017-02-22 RX ADMIN — Medication 400 MILLIGRAM(S): at 11:26

## 2017-02-22 RX ADMIN — Medication 400 MILLIGRAM(S): at 06:39

## 2017-02-22 RX ADMIN — LOSARTAN POTASSIUM 100 MILLIGRAM(S): 100 TABLET, FILM COATED ORAL at 06:42

## 2017-02-22 RX ADMIN — Medication 100 MILLIGRAM(S): at 11:26

## 2017-02-22 RX ADMIN — PIPERACILLIN AND TAZOBACTAM 25 GRAM(S): 4; .5 INJECTION, POWDER, LYOPHILIZED, FOR SOLUTION INTRAVENOUS at 13:56

## 2017-02-22 RX ADMIN — FLUCONAZOLE 200 MILLIGRAM(S): 150 TABLET ORAL at 11:26

## 2017-02-22 RX ADMIN — CEFTRIAXONE 100 GRAM(S): 500 INJECTION, POWDER, FOR SOLUTION INTRAMUSCULAR; INTRAVENOUS at 18:16

## 2017-02-22 RX ADMIN — Medication 480 MICROGRAM(S): at 11:26

## 2017-02-23 LAB
ANION GAP SERPL CALC-SCNC: 10 MMOL/L — SIGNIFICANT CHANGE UP (ref 5–17)
BASOPHILS # BLD AUTO: 0 K/UL — SIGNIFICANT CHANGE UP (ref 0–0.2)
BUN SERPL-MCNC: 11 MG/DL — SIGNIFICANT CHANGE UP (ref 7–23)
CALCIUM SERPL-MCNC: 9.3 MG/DL — SIGNIFICANT CHANGE UP (ref 8.4–10.5)
CHLORIDE SERPL-SCNC: 102 MMOL/L — SIGNIFICANT CHANGE UP (ref 96–108)
CO2 SERPL-SCNC: 29 MMOL/L — SIGNIFICANT CHANGE UP (ref 22–31)
CREAT SERPL-MCNC: 0.61 MG/DL — SIGNIFICANT CHANGE UP (ref 0.5–1.3)
CULTURE RESULTS: SIGNIFICANT CHANGE UP
CULTURE RESULTS: SIGNIFICANT CHANGE UP
EOSINOPHIL # BLD AUTO: 0 K/UL — SIGNIFICANT CHANGE UP (ref 0–0.5)
EOSINOPHIL NFR BLD AUTO: 1 % — SIGNIFICANT CHANGE UP (ref 0–6)
GLUCOSE SERPL-MCNC: 97 MG/DL — SIGNIFICANT CHANGE UP (ref 70–99)
HCT VFR BLD CALC: 21.2 % — LOW (ref 39–50)
HGB BLD-MCNC: 7.3 G/DL — LOW (ref 13–17)
LYMPHOCYTES # BLD AUTO: 1 K/UL — SIGNIFICANT CHANGE UP (ref 1–3.3)
LYMPHOCYTES # BLD AUTO: 25 % — SIGNIFICANT CHANGE UP (ref 13–44)
MAGNESIUM SERPL-MCNC: 1.8 MG/DL — SIGNIFICANT CHANGE UP (ref 1.6–2.6)
MCHC RBC-ENTMCNC: 30 PG — SIGNIFICANT CHANGE UP (ref 27–34)
MCHC RBC-ENTMCNC: 34.4 GM/DL — SIGNIFICANT CHANGE UP (ref 32–36)
MCV RBC AUTO: 87.4 FL — SIGNIFICANT CHANGE UP (ref 80–100)
MONOCYTES # BLD AUTO: 0 K/UL — SIGNIFICANT CHANGE UP (ref 0–0.9)
MONOCYTES NFR BLD AUTO: 3 % — SIGNIFICANT CHANGE UP (ref 2–14)
NEUTROPHILS # BLD AUTO: 2.4 K/UL — SIGNIFICANT CHANGE UP (ref 1.8–7.4)
NEUTROPHILS NFR BLD AUTO: 43 % — SIGNIFICANT CHANGE UP (ref 43–77)
PHOSPHATE SERPL-MCNC: 3.3 MG/DL — SIGNIFICANT CHANGE UP (ref 2.5–4.5)
PLATELET # BLD AUTO: 10 K/UL — CRITICAL LOW (ref 150–400)
POTASSIUM SERPL-MCNC: 3.6 MMOL/L — SIGNIFICANT CHANGE UP (ref 3.5–5.3)
POTASSIUM SERPL-SCNC: 3.6 MMOL/L — SIGNIFICANT CHANGE UP (ref 3.5–5.3)
RBC # BLD: 2.42 M/UL — LOW (ref 4.2–5.8)
RBC # FLD: 13.7 % — SIGNIFICANT CHANGE UP (ref 10.3–14.5)
SODIUM SERPL-SCNC: 141 MMOL/L — SIGNIFICANT CHANGE UP (ref 135–145)
SPECIMEN SOURCE: SIGNIFICANT CHANGE UP
SPECIMEN SOURCE: SIGNIFICANT CHANGE UP
WBC # BLD: 3.5 K/UL — LOW (ref 3.8–10.5)
WBC # FLD AUTO: 3.5 K/UL — LOW (ref 3.8–10.5)

## 2017-02-23 PROCEDURE — 99233 SBSQ HOSP IP/OBS HIGH 50: CPT | Mod: GC

## 2017-02-23 RX ADMIN — Medication 400 MILLIGRAM(S): at 22:13

## 2017-02-23 RX ADMIN — Medication 100 MILLIGRAM(S): at 05:13

## 2017-02-23 RX ADMIN — Medication 100 MILLIGRAM(S): at 22:13

## 2017-02-23 RX ADMIN — Medication 100 MILLIGRAM(S): at 15:02

## 2017-02-23 RX ADMIN — Medication 650 MILLIGRAM(S): at 08:52

## 2017-02-23 RX ADMIN — Medication 400 MILLIGRAM(S): at 15:02

## 2017-02-23 RX ADMIN — Medication 480 MICROGRAM(S): at 14:14

## 2017-02-23 RX ADMIN — FLUCONAZOLE 200 MILLIGRAM(S): 150 TABLET ORAL at 12:34

## 2017-02-23 RX ADMIN — LOSARTAN POTASSIUM 100 MILLIGRAM(S): 100 TABLET, FILM COATED ORAL at 05:13

## 2017-02-23 RX ADMIN — SENNA PLUS 2 TABLET(S): 8.6 TABLET ORAL at 22:13

## 2017-02-23 RX ADMIN — CEFTRIAXONE 100 GRAM(S): 500 INJECTION, POWDER, FOR SOLUTION INTRAMUSCULAR; INTRAVENOUS at 17:44

## 2017-02-23 RX ADMIN — Medication 400 MILLIGRAM(S): at 05:13

## 2017-02-24 ENCOUNTER — APPOINTMENT (OUTPATIENT)
Dept: INFUSION THERAPY | Facility: HOSPITAL | Age: 82
End: 2017-02-24

## 2017-02-24 LAB
BASOPHILS # BLD AUTO: 0 K/UL — SIGNIFICANT CHANGE UP (ref 0–0.2)
CULTURE RESULTS: SIGNIFICANT CHANGE UP
CULTURE RESULTS: SIGNIFICANT CHANGE UP
EOSINOPHIL # BLD AUTO: 0 K/UL — SIGNIFICANT CHANGE UP (ref 0–0.5)
EOSINOPHIL NFR BLD AUTO: 1 % — SIGNIFICANT CHANGE UP (ref 0–6)
HCT VFR BLD CALC: 20 % — CRITICAL LOW (ref 39–50)
HCT VFR BLD CALC: 21.7 % — LOW (ref 39–50)
HCT VFR BLD CALC: 22.6 % — LOW (ref 39–50)
HGB BLD-MCNC: 6.9 G/DL — CRITICAL LOW (ref 13–17)
HGB BLD-MCNC: 7.3 G/DL — LOW (ref 13–17)
HGB BLD-MCNC: 7.7 G/DL — LOW (ref 13–17)
LYMPHOCYTES # BLD AUTO: 1.4 K/UL — SIGNIFICANT CHANGE UP (ref 1–3.3)
LYMPHOCYTES # BLD AUTO: 50 % — HIGH (ref 13–44)
MCHC RBC-ENTMCNC: 29.5 PG — SIGNIFICANT CHANGE UP (ref 27–34)
MCHC RBC-ENTMCNC: 29.5 PG — SIGNIFICANT CHANGE UP (ref 27–34)
MCHC RBC-ENTMCNC: 30 PG — SIGNIFICANT CHANGE UP (ref 27–34)
MCHC RBC-ENTMCNC: 33.8 GM/DL — SIGNIFICANT CHANGE UP (ref 32–36)
MCHC RBC-ENTMCNC: 33.9 GM/DL — SIGNIFICANT CHANGE UP (ref 32–36)
MCHC RBC-ENTMCNC: 34.4 GM/DL — SIGNIFICANT CHANGE UP (ref 32–36)
MCV RBC AUTO: 87.1 FL — SIGNIFICANT CHANGE UP (ref 80–100)
MCV RBC AUTO: 87.1 FL — SIGNIFICANT CHANGE UP (ref 80–100)
MCV RBC AUTO: 87.2 FL — SIGNIFICANT CHANGE UP (ref 80–100)
METAMYELOCYTES # FLD: 2 % — HIGH (ref 0–0)
MONOCYTES # BLD AUTO: 0 K/UL — SIGNIFICANT CHANGE UP (ref 0–0.9)
MONOCYTES NFR BLD AUTO: 1 % — LOW (ref 2–14)
MYELOCYTES NFR BLD: 1 % — HIGH (ref 0–0)
NEUTROPHILS # BLD AUTO: 2.8 K/UL — SIGNIFICANT CHANGE UP (ref 1.8–7.4)
NEUTROPHILS NFR BLD AUTO: 28 % — LOW (ref 43–77)
NEUTS BAND # BLD: 17 % — HIGH (ref 0–8)
PLAT MORPH BLD: NORMAL — SIGNIFICANT CHANGE UP
PLATELET # BLD AUTO: 18 K/UL — CRITICAL LOW (ref 150–400)
PLATELET # BLD AUTO: 7 K/UL — CRITICAL LOW (ref 150–400)
PLATELET # BLD AUTO: 8 K/UL — CRITICAL LOW (ref 150–400)
RBC # BLD: 2.3 M/UL — LOW (ref 4.2–5.8)
RBC # BLD: 2.49 M/UL — LOW (ref 4.2–5.8)
RBC # BLD: 2.6 M/UL — LOW (ref 4.2–5.8)
RBC # FLD: 13.7 % — SIGNIFICANT CHANGE UP (ref 10.3–14.5)
RBC # FLD: 13.7 % — SIGNIFICANT CHANGE UP (ref 10.3–14.5)
RBC # FLD: 14.1 % — SIGNIFICANT CHANGE UP (ref 10.3–14.5)
RBC BLD AUTO: SIGNIFICANT CHANGE UP
SPECIMEN SOURCE: SIGNIFICANT CHANGE UP
SPECIMEN SOURCE: SIGNIFICANT CHANGE UP
WBC # BLD: 1.7 K/UL — LOW (ref 3.8–10.5)
WBC # BLD: 2.4 K/UL — LOW (ref 3.8–10.5)
WBC # BLD: 4.2 K/UL — SIGNIFICANT CHANGE UP (ref 3.8–10.5)
WBC # FLD AUTO: 1.7 K/UL — LOW (ref 3.8–10.5)
WBC # FLD AUTO: 2.4 K/UL — LOW (ref 3.8–10.5)
WBC # FLD AUTO: 4.2 K/UL — SIGNIFICANT CHANGE UP (ref 3.8–10.5)

## 2017-02-24 PROCEDURE — 99232 SBSQ HOSP IP/OBS MODERATE 35: CPT

## 2017-02-24 PROCEDURE — 70450 CT HEAD/BRAIN W/O DYE: CPT | Mod: 26

## 2017-02-24 PROCEDURE — 99233 SBSQ HOSP IP/OBS HIGH 50: CPT | Mod: GC

## 2017-02-24 RX ORDER — ACETAMINOPHEN 500 MG
650 TABLET ORAL ONCE
Qty: 0 | Refills: 0 | Status: COMPLETED | OUTPATIENT
Start: 2017-02-24 | End: 2017-02-24

## 2017-02-24 RX ORDER — LORATADINE 10 MG/1
10 TABLET ORAL ONCE
Qty: 0 | Refills: 0 | Status: DISCONTINUED | OUTPATIENT
Start: 2017-02-24 | End: 2017-02-24

## 2017-02-24 RX ORDER — OXYCODONE HYDROCHLORIDE 5 MG/1
5 TABLET ORAL ONCE
Qty: 0 | Refills: 0 | Status: DISCONTINUED | OUTPATIENT
Start: 2017-02-24 | End: 2017-02-24

## 2017-02-24 RX ORDER — HYDRALAZINE HCL 50 MG
10 TABLET ORAL ONCE
Qty: 0 | Refills: 0 | Status: COMPLETED | OUTPATIENT
Start: 2017-02-24 | End: 2017-02-24

## 2017-02-24 RX ADMIN — OXYCODONE HYDROCHLORIDE 5 MILLIGRAM(S): 5 TABLET ORAL at 11:46

## 2017-02-24 RX ADMIN — Medication 650 MILLIGRAM(S): at 15:10

## 2017-02-24 RX ADMIN — LORATADINE 10 MILLIGRAM(S): 10 TABLET ORAL at 09:21

## 2017-02-24 RX ADMIN — Medication 650 MILLIGRAM(S): at 09:22

## 2017-02-24 RX ADMIN — OXYCODONE HYDROCHLORIDE 5 MILLIGRAM(S): 5 TABLET ORAL at 11:16

## 2017-02-24 RX ADMIN — CEFTRIAXONE 100 GRAM(S): 500 INJECTION, POWDER, FOR SOLUTION INTRAMUSCULAR; INTRAVENOUS at 17:42

## 2017-02-24 RX ADMIN — Medication 100 MILLIGRAM(S): at 21:46

## 2017-02-24 RX ADMIN — LOSARTAN POTASSIUM 100 MILLIGRAM(S): 100 TABLET, FILM COATED ORAL at 05:56

## 2017-02-24 RX ADMIN — FLUCONAZOLE 200 MILLIGRAM(S): 150 TABLET ORAL at 11:16

## 2017-02-24 RX ADMIN — Medication 400 MILLIGRAM(S): at 21:45

## 2017-02-24 RX ADMIN — Medication 100 MILLIGRAM(S): at 05:56

## 2017-02-24 RX ADMIN — SENNA PLUS 2 TABLET(S): 8.6 TABLET ORAL at 21:45

## 2017-02-24 RX ADMIN — Medication 650 MILLIGRAM(S): at 09:21

## 2017-02-24 RX ADMIN — Medication 650 MILLIGRAM(S): at 21:44

## 2017-02-24 RX ADMIN — Medication 400 MILLIGRAM(S): at 05:56

## 2017-02-24 RX ADMIN — Medication 10 MILLIGRAM(S): at 21:45

## 2017-02-24 RX ADMIN — Medication 400 MILLIGRAM(S): at 15:24

## 2017-02-24 RX ADMIN — Medication 100 MILLIGRAM(S): at 15:25

## 2017-02-25 ENCOUNTER — APPOINTMENT (OUTPATIENT)
Dept: INFUSION THERAPY | Facility: HOSPITAL | Age: 82
End: 2017-02-25

## 2017-02-25 LAB
HCT VFR BLD CALC: 22.3 % — LOW (ref 39–50)
HGB BLD-MCNC: 7.9 G/DL — LOW (ref 13–17)
MCHC RBC-ENTMCNC: 30.9 PG — SIGNIFICANT CHANGE UP (ref 27–34)
MCHC RBC-ENTMCNC: 35.5 GM/DL — SIGNIFICANT CHANGE UP (ref 32–36)
MCV RBC AUTO: 87.2 FL — SIGNIFICANT CHANGE UP (ref 80–100)
PLATELET # BLD AUTO: 13 K/UL — CRITICAL LOW (ref 150–400)
RBC # BLD: 2.55 M/UL — LOW (ref 4.2–5.8)
RBC # FLD: 13.5 % — SIGNIFICANT CHANGE UP (ref 10.3–14.5)
WBC # BLD: 1.6 K/UL — LOW (ref 3.8–10.5)
WBC # FLD AUTO: 1.6 K/UL — LOW (ref 3.8–10.5)

## 2017-02-25 PROCEDURE — 99233 SBSQ HOSP IP/OBS HIGH 50: CPT | Mod: GC

## 2017-02-25 RX ORDER — AMLODIPINE BESYLATE 2.5 MG/1
2.5 TABLET ORAL DAILY
Qty: 0 | Refills: 0 | Status: DISCONTINUED | OUTPATIENT
Start: 2017-02-25 | End: 2017-02-26

## 2017-02-25 RX ORDER — HYDRALAZINE HCL 50 MG
10 TABLET ORAL ONCE
Qty: 0 | Refills: 0 | Status: DISCONTINUED | OUTPATIENT
Start: 2017-02-25 | End: 2017-02-25

## 2017-02-25 RX ORDER — HYDRALAZINE HCL 50 MG
10 TABLET ORAL THREE TIMES A DAY
Qty: 0 | Refills: 0 | Status: DISCONTINUED | OUTPATIENT
Start: 2017-02-25 | End: 2017-02-26

## 2017-02-25 RX ORDER — HYDRALAZINE HCL 50 MG
5 TABLET ORAL ONCE
Qty: 0 | Refills: 0 | Status: COMPLETED | OUTPATIENT
Start: 2017-02-25 | End: 2017-02-25

## 2017-02-25 RX ORDER — OXYCODONE HYDROCHLORIDE 5 MG/1
5 TABLET ORAL ONCE
Qty: 0 | Refills: 0 | Status: DISCONTINUED | OUTPATIENT
Start: 2017-02-25 | End: 2017-02-25

## 2017-02-25 RX ADMIN — SENNA PLUS 2 TABLET(S): 8.6 TABLET ORAL at 21:58

## 2017-02-25 RX ADMIN — Medication 400 MILLIGRAM(S): at 05:47

## 2017-02-25 RX ADMIN — LOSARTAN POTASSIUM 100 MILLIGRAM(S): 100 TABLET, FILM COATED ORAL at 05:47

## 2017-02-25 RX ADMIN — OXYCODONE HYDROCHLORIDE 5 MILLIGRAM(S): 5 TABLET ORAL at 17:38

## 2017-02-25 RX ADMIN — Medication 5 MILLIGRAM(S): at 11:43

## 2017-02-25 RX ADMIN — Medication 100 MILLIGRAM(S): at 05:47

## 2017-02-25 RX ADMIN — CEFTRIAXONE 100 GRAM(S): 500 INJECTION, POWDER, FOR SOLUTION INTRAMUSCULAR; INTRAVENOUS at 17:39

## 2017-02-25 RX ADMIN — Medication 400 MILLIGRAM(S): at 21:58

## 2017-02-25 RX ADMIN — Medication 400 MILLIGRAM(S): at 14:31

## 2017-02-25 RX ADMIN — OXYCODONE HYDROCHLORIDE 5 MILLIGRAM(S): 5 TABLET ORAL at 18:15

## 2017-02-25 RX ADMIN — Medication 650 MILLIGRAM(S): at 17:05

## 2017-02-25 RX ADMIN — FLUCONAZOLE 200 MILLIGRAM(S): 150 TABLET ORAL at 11:50

## 2017-02-25 RX ADMIN — Medication 100 MILLIGRAM(S): at 21:58

## 2017-02-25 RX ADMIN — Medication 650 MILLIGRAM(S): at 16:35

## 2017-02-25 RX ADMIN — AMLODIPINE BESYLATE 2.5 MILLIGRAM(S): 2.5 TABLET ORAL at 09:32

## 2017-02-25 RX ADMIN — Medication 10 MILLIGRAM(S): at 21:58

## 2017-02-25 RX ADMIN — Medication 650 MILLIGRAM(S): at 02:36

## 2017-02-25 RX ADMIN — Medication 10 MILLIGRAM(S): at 11:52

## 2017-02-26 LAB
HCT VFR BLD CALC: 24.3 % — LOW (ref 39–50)
HGB BLD-MCNC: 8.2 G/DL — LOW (ref 13–17)
MCHC RBC-ENTMCNC: 29.5 PG — SIGNIFICANT CHANGE UP (ref 27–34)
MCHC RBC-ENTMCNC: 33.7 GM/DL — SIGNIFICANT CHANGE UP (ref 32–36)
MCV RBC AUTO: 87.3 FL — SIGNIFICANT CHANGE UP (ref 80–100)
PLATELET # BLD AUTO: 10 K/UL — CRITICAL LOW (ref 150–400)
RBC # BLD: 2.79 M/UL — LOW (ref 4.2–5.8)
RBC # FLD: 13.4 % — SIGNIFICANT CHANGE UP (ref 10.3–14.5)
WBC # BLD: 0.9 K/UL — CRITICAL LOW (ref 3.8–10.5)
WBC # FLD AUTO: 0.9 K/UL — CRITICAL LOW (ref 3.8–10.5)

## 2017-02-26 PROCEDURE — 99233 SBSQ HOSP IP/OBS HIGH 50: CPT | Mod: GC

## 2017-02-26 PROCEDURE — 70450 CT HEAD/BRAIN W/O DYE: CPT | Mod: 26

## 2017-02-26 RX ORDER — AMLODIPINE BESYLATE 2.5 MG/1
2.5 TABLET ORAL ONCE
Qty: 0 | Refills: 0 | Status: COMPLETED | OUTPATIENT
Start: 2017-02-26 | End: 2017-02-26

## 2017-02-26 RX ORDER — HYDRALAZINE HCL 50 MG
2.5 TABLET ORAL ONCE
Qty: 0 | Refills: 0 | Status: COMPLETED | OUTPATIENT
Start: 2017-02-26 | End: 2017-02-26

## 2017-02-26 RX ORDER — HYDRALAZINE HCL 50 MG
25 TABLET ORAL THREE TIMES A DAY
Qty: 0 | Refills: 0 | Status: DISCONTINUED | OUTPATIENT
Start: 2017-02-26 | End: 2017-03-02

## 2017-02-26 RX ORDER — AMLODIPINE BESYLATE 2.5 MG/1
5 TABLET ORAL DAILY
Qty: 0 | Refills: 0 | Status: DISCONTINUED | OUTPATIENT
Start: 2017-02-27 | End: 2017-03-02

## 2017-02-26 RX ORDER — POLYETHYLENE GLYCOL 3350 17 G/17G
17 POWDER, FOR SOLUTION ORAL ONCE
Qty: 0 | Refills: 0 | Status: COMPLETED | OUTPATIENT
Start: 2017-02-26 | End: 2017-02-26

## 2017-02-26 RX ADMIN — Medication 400 MILLIGRAM(S): at 05:21

## 2017-02-26 RX ADMIN — Medication 650 MILLIGRAM(S): at 08:20

## 2017-02-26 RX ADMIN — Medication 400 MILLIGRAM(S): at 13:50

## 2017-02-26 RX ADMIN — Medication 400 MILLIGRAM(S): at 22:47

## 2017-02-26 RX ADMIN — FLUCONAZOLE 200 MILLIGRAM(S): 150 TABLET ORAL at 11:42

## 2017-02-26 RX ADMIN — POLYETHYLENE GLYCOL 3350 17 GRAM(S): 17 POWDER, FOR SOLUTION ORAL at 05:21

## 2017-02-26 RX ADMIN — Medication 25 MILLIGRAM(S): at 22:48

## 2017-02-26 RX ADMIN — AMLODIPINE BESYLATE 2.5 MILLIGRAM(S): 2.5 TABLET ORAL at 05:21

## 2017-02-26 RX ADMIN — Medication 650 MILLIGRAM(S): at 01:13

## 2017-02-26 RX ADMIN — Medication 100 MILLIGRAM(S): at 13:50

## 2017-02-26 RX ADMIN — Medication 10 MILLIGRAM(S): at 05:21

## 2017-02-26 RX ADMIN — Medication 650 MILLIGRAM(S): at 01:43

## 2017-02-26 RX ADMIN — Medication 2.5 MILLIGRAM(S): at 01:14

## 2017-02-26 RX ADMIN — Medication 650 MILLIGRAM(S): at 09:10

## 2017-02-26 RX ADMIN — CEFTRIAXONE 100 GRAM(S): 500 INJECTION, POWDER, FOR SOLUTION INTRAMUSCULAR; INTRAVENOUS at 17:26

## 2017-02-26 RX ADMIN — SENNA PLUS 2 TABLET(S): 8.6 TABLET ORAL at 22:47

## 2017-02-26 RX ADMIN — AMLODIPINE BESYLATE 2.5 MILLIGRAM(S): 2.5 TABLET ORAL at 10:12

## 2017-02-26 RX ADMIN — Medication 100 MILLIGRAM(S): at 22:47

## 2017-02-26 RX ADMIN — Medication 25 MILLIGRAM(S): at 13:50

## 2017-02-26 RX ADMIN — LOSARTAN POTASSIUM 100 MILLIGRAM(S): 100 TABLET, FILM COATED ORAL at 05:21

## 2017-02-26 RX ADMIN — Medication 100 MILLIGRAM(S): at 05:21

## 2017-02-26 NOTE — PROVIDER CONTACT NOTE (OTHER) - BACKGROUND
His BP has been elevated and medications were increased today. BP midday was 160/76 before newly increased dose of hydralazine was given.
There is an order for Tylenol for mild pain (1-3).
HX of HTN
HX of HTN. pt admitted for neutropenic fever
core lab result hgb 7.0/ hct 19.7/platelets 9
dx: intrahepatic bile duct carcioma
pt hx of AML
pt transfused for platelet count of 9

## 2017-02-26 NOTE — PROVIDER CONTACT NOTE (OTHER) - ASSESSMENT
B/P taken
Pt currently has a headache, unchanged throughout the day.
AOx4, VS as documented, no distress noted, pt remains asymptomatic
Pt is AOx4. VS as documented, no distress noted. Pt states ride sided eye pain and headache often occurs after watching tv and pain is relieved w /Tylenol. No neuro deficits noted. Pt denies CP, SOB
pt asymptomatic
pt c/o very bad headache.
pt resting comfortably in bed
pt with chills and tachycardic heart rate of 113-114 temp resolved to 37.7

## 2017-02-27 ENCOUNTER — APPOINTMENT (OUTPATIENT)
Dept: HEMATOLOGY ONCOLOGY | Facility: CLINIC | Age: 82
End: 2017-02-27

## 2017-02-27 ENCOUNTER — OUTPATIENT (OUTPATIENT)
Dept: OUTPATIENT SERVICES | Facility: HOSPITAL | Age: 82
LOS: 1 days | End: 2017-02-27
Payer: MEDICARE

## 2017-02-27 ENCOUNTER — APPOINTMENT (OUTPATIENT)
Dept: INFUSION THERAPY | Facility: HOSPITAL | Age: 82
End: 2017-02-27

## 2017-02-27 DIAGNOSIS — C92.00 ACUTE MYELOBLASTIC LEUKEMIA, NOT HAVING ACHIEVED REMISSION: ICD-10-CM

## 2017-02-27 PROBLEM — I10 ESSENTIAL (PRIMARY) HYPERTENSION: Chronic | Status: ACTIVE | Noted: 2017-02-16

## 2017-02-27 PROBLEM — C95.90 LEUKEMIA, UNSPECIFIED NOT HAVING ACHIEVED REMISSION: Chronic | Status: ACTIVE | Noted: 2017-02-16

## 2017-02-27 LAB
HCT VFR BLD CALC: 20.4 % — CRITICAL LOW (ref 39–50)
HCT VFR BLD CALC: 22.5 % — LOW (ref 39–50)
HGB BLD-MCNC: 7.3 G/DL — LOW (ref 13–17)
HGB BLD-MCNC: 7.8 G/DL — LOW (ref 13–17)
MCHC RBC-ENTMCNC: 29.7 PG — SIGNIFICANT CHANGE UP (ref 27–34)
MCHC RBC-ENTMCNC: 30.9 PG — SIGNIFICANT CHANGE UP (ref 27–34)
MCHC RBC-ENTMCNC: 34.5 GM/DL — SIGNIFICANT CHANGE UP (ref 32–36)
MCHC RBC-ENTMCNC: 35.9 GM/DL — SIGNIFICANT CHANGE UP (ref 32–36)
MCV RBC AUTO: 86 FL — SIGNIFICANT CHANGE UP (ref 80–100)
MCV RBC AUTO: 86.1 FL — SIGNIFICANT CHANGE UP (ref 80–100)
PLATELET # BLD AUTO: 5 K/UL — CRITICAL LOW (ref 150–400)
PLATELET # BLD AUTO: 7 K/UL — CRITICAL LOW (ref 150–400)
RBC # BLD: 2.37 M/UL — LOW (ref 4.2–5.8)
RBC # BLD: 2.61 M/UL — LOW (ref 4.2–5.8)
RBC # FLD: 13.4 % — SIGNIFICANT CHANGE UP (ref 10.3–14.5)
RBC # FLD: 13.5 % — SIGNIFICANT CHANGE UP (ref 10.3–14.5)
WBC # BLD: 0.6 K/UL — CRITICAL LOW (ref 3.8–10.5)
WBC # BLD: 0.7 K/UL — CRITICAL LOW (ref 3.8–10.5)
WBC # FLD AUTO: 0.6 K/UL — CRITICAL LOW (ref 3.8–10.5)
WBC # FLD AUTO: 0.7 K/UL — CRITICAL LOW (ref 3.8–10.5)

## 2017-02-27 PROCEDURE — 99232 SBSQ HOSP IP/OBS MODERATE 35: CPT

## 2017-02-27 PROCEDURE — 99233 SBSQ HOSP IP/OBS HIGH 50: CPT | Mod: GC

## 2017-02-27 RX ORDER — ACETAMINOPHEN 500 MG
650 TABLET ORAL ONCE
Qty: 0 | Refills: 0 | Status: COMPLETED | OUTPATIENT
Start: 2017-02-27 | End: 2017-02-27

## 2017-02-27 RX ORDER — ACETAMINOPHEN 500 MG
650 TABLET ORAL ONCE
Qty: 0 | Refills: 0 | Status: DISCONTINUED | OUTPATIENT
Start: 2017-02-27 | End: 2017-02-27

## 2017-02-27 RX ADMIN — Medication 650 MILLIGRAM(S): at 21:19

## 2017-02-27 RX ADMIN — LOSARTAN POTASSIUM 100 MILLIGRAM(S): 100 TABLET, FILM COATED ORAL at 05:31

## 2017-02-27 RX ADMIN — Medication 400 MILLIGRAM(S): at 21:22

## 2017-02-27 RX ADMIN — SENNA PLUS 2 TABLET(S): 8.6 TABLET ORAL at 21:21

## 2017-02-27 RX ADMIN — Medication 650 MILLIGRAM(S): at 02:12

## 2017-02-27 RX ADMIN — Medication 650 MILLIGRAM(S): at 15:16

## 2017-02-27 RX ADMIN — Medication 25 MILLIGRAM(S): at 05:31

## 2017-02-27 RX ADMIN — CEFTRIAXONE 100 GRAM(S): 500 INJECTION, POWDER, FOR SOLUTION INTRAMUSCULAR; INTRAVENOUS at 17:32

## 2017-02-27 RX ADMIN — Medication 650 MILLIGRAM(S): at 22:05

## 2017-02-27 RX ADMIN — LORATADINE 10 MILLIGRAM(S): 10 TABLET ORAL at 15:16

## 2017-02-27 RX ADMIN — Medication 25 MILLIGRAM(S): at 21:21

## 2017-02-27 RX ADMIN — Medication 100 MILLIGRAM(S): at 21:21

## 2017-02-27 RX ADMIN — Medication 400 MILLIGRAM(S): at 13:11

## 2017-02-27 RX ADMIN — Medication 400 MILLIGRAM(S): at 05:31

## 2017-02-27 RX ADMIN — AMLODIPINE BESYLATE 5 MILLIGRAM(S): 2.5 TABLET ORAL at 05:31

## 2017-02-27 RX ADMIN — FLUCONAZOLE 200 MILLIGRAM(S): 150 TABLET ORAL at 13:07

## 2017-02-27 RX ADMIN — Medication 100 MILLIGRAM(S): at 05:31

## 2017-02-28 ENCOUNTER — OUTPATIENT (OUTPATIENT)
Dept: OUTPATIENT SERVICES | Facility: HOSPITAL | Age: 82
LOS: 1 days | Discharge: HOME | End: 2017-02-28

## 2017-02-28 DIAGNOSIS — C95.00 ACUTE LEUKEMIA OF UNSPECIFIED CELL TYPE NOT HAVING ACHIEVED REMISSION: ICD-10-CM

## 2017-02-28 LAB
BASOPHILS # BLD AUTO: 0 K/UL — SIGNIFICANT CHANGE UP (ref 0–0.2)
EOSINOPHIL # BLD AUTO: 0 K/UL — SIGNIFICANT CHANGE UP (ref 0–0.5)
HCT VFR BLD CALC: 19.9 % — CRITICAL LOW (ref 39–50)
HCT VFR BLD CALC: 22 % — LOW (ref 39–50)
HGB BLD-MCNC: 7 G/DL — CRITICAL LOW (ref 13–17)
HGB BLD-MCNC: 7.5 G/DL — LOW (ref 13–17)
LYMPHOCYTES # BLD AUTO: 0.5 K/UL — LOW (ref 1–3.3)
LYMPHOCYTES # BLD AUTO: 92 % — HIGH (ref 13–44)
MCHC RBC-ENTMCNC: 29.2 PG — SIGNIFICANT CHANGE UP (ref 27–34)
MCHC RBC-ENTMCNC: 30.1 PG — SIGNIFICANT CHANGE UP (ref 27–34)
MCHC RBC-ENTMCNC: 34.2 GM/DL — SIGNIFICANT CHANGE UP (ref 32–36)
MCHC RBC-ENTMCNC: 34.9 GM/DL — SIGNIFICANT CHANGE UP (ref 32–36)
MCV RBC AUTO: 85.4 FL — SIGNIFICANT CHANGE UP (ref 80–100)
MCV RBC AUTO: 86.2 FL — SIGNIFICANT CHANGE UP (ref 80–100)
MONOCYTES # BLD AUTO: 0 K/UL — SIGNIFICANT CHANGE UP (ref 0–0.9)
NEUTROPHILS # BLD AUTO: 0.1 K/UL — LOW (ref 1.8–7.4)
NEUTROPHILS NFR BLD AUTO: 4 % — LOW (ref 43–77)
PLATELET # BLD AUTO: 21 K/UL — LOW (ref 150–400)
PLATELET # BLD AUTO: 5 K/UL — CRITICAL LOW (ref 150–400)
RBC # BLD: 2.31 M/UL — LOW (ref 4.2–5.8)
RBC # BLD: 2.58 M/UL — LOW (ref 4.2–5.8)
RBC # FLD: 13.1 % — SIGNIFICANT CHANGE UP (ref 10.3–14.5)
RBC # FLD: 13.3 % — SIGNIFICANT CHANGE UP (ref 10.3–14.5)
WBC # BLD: 0.6 K/UL — CRITICAL LOW (ref 3.8–10.5)
WBC # BLD: 0.8 K/UL — CRITICAL LOW (ref 3.8–10.5)
WBC # FLD AUTO: 0.6 K/UL — CRITICAL LOW (ref 3.8–10.5)
WBC # FLD AUTO: 0.8 K/UL — CRITICAL LOW (ref 3.8–10.5)

## 2017-02-28 PROCEDURE — 99233 SBSQ HOSP IP/OBS HIGH 50: CPT | Mod: GC

## 2017-02-28 PROCEDURE — 99232 SBSQ HOSP IP/OBS MODERATE 35: CPT

## 2017-02-28 RX ORDER — ACETAMINOPHEN 500 MG
650 TABLET ORAL ONCE
Qty: 0 | Refills: 0 | Status: COMPLETED | OUTPATIENT
Start: 2017-02-28 | End: 2017-02-28

## 2017-02-28 RX ORDER — HYDROCORTISONE 1 %
1 OINTMENT (GRAM) TOPICAL
Qty: 0 | Refills: 0 | Status: DISCONTINUED | OUTPATIENT
Start: 2017-02-28 | End: 2017-03-02

## 2017-02-28 RX ORDER — OXYCODONE HYDROCHLORIDE 5 MG/1
5 TABLET ORAL EVERY 6 HOURS
Qty: 0 | Refills: 0 | Status: DISCONTINUED | OUTPATIENT
Start: 2017-02-28 | End: 2017-03-02

## 2017-02-28 RX ADMIN — LOSARTAN POTASSIUM 100 MILLIGRAM(S): 100 TABLET, FILM COATED ORAL at 05:14

## 2017-02-28 RX ADMIN — CEFTRIAXONE 100 GRAM(S): 500 INJECTION, POWDER, FOR SOLUTION INTRAMUSCULAR; INTRAVENOUS at 16:53

## 2017-02-28 RX ADMIN — Medication 400 MILLIGRAM(S): at 05:14

## 2017-02-28 RX ADMIN — Medication 650 MILLIGRAM(S): at 10:49

## 2017-02-28 RX ADMIN — OXYCODONE HYDROCHLORIDE 5 MILLIGRAM(S): 5 TABLET ORAL at 13:10

## 2017-02-28 RX ADMIN — Medication 100 MILLIGRAM(S): at 05:14

## 2017-02-28 RX ADMIN — Medication 100 MILLIGRAM(S): at 13:10

## 2017-02-28 RX ADMIN — Medication 650 MILLIGRAM(S): at 06:00

## 2017-02-28 RX ADMIN — Medication 650 MILLIGRAM(S): at 05:15

## 2017-02-28 RX ADMIN — FLUCONAZOLE 200 MILLIGRAM(S): 150 TABLET ORAL at 13:10

## 2017-02-28 RX ADMIN — Medication 400 MILLIGRAM(S): at 13:10

## 2017-02-28 RX ADMIN — Medication 1 APPLICATION(S): at 22:32

## 2017-02-28 RX ADMIN — Medication 100 MILLIGRAM(S): at 22:32

## 2017-02-28 RX ADMIN — Medication 25 MILLIGRAM(S): at 05:14

## 2017-02-28 RX ADMIN — Medication 400 MILLIGRAM(S): at 22:31

## 2017-02-28 RX ADMIN — OXYCODONE HYDROCHLORIDE 5 MILLIGRAM(S): 5 TABLET ORAL at 14:00

## 2017-02-28 RX ADMIN — Medication 1 APPLICATION(S): at 14:21

## 2017-02-28 RX ADMIN — LORATADINE 10 MILLIGRAM(S): 10 TABLET ORAL at 05:17

## 2017-02-28 RX ADMIN — AMLODIPINE BESYLATE 5 MILLIGRAM(S): 2.5 TABLET ORAL at 05:14

## 2017-02-28 RX ADMIN — Medication 650 MILLIGRAM(S): at 17:23

## 2017-02-28 RX ADMIN — SENNA PLUS 2 TABLET(S): 8.6 TABLET ORAL at 22:31

## 2017-02-28 NOTE — DIETITIAN INITIAL EVALUATION ADULT. - PROBLEM SELECTOR PLAN 3
-h/o of hypertension, initially hypertensive on presentation but now SBP in 130  - will hold home losartan/hctz for now and monitor BP in setting of sepsis  - monitor blood pressure; if uptrends will restart losartan and HCTZ

## 2017-02-28 NOTE — DIETITIAN INITIAL EVALUATION ADULT. - ETIOLOGY
related to catabolic illness, difficulty chewing increased nutrient needs due to catabolic illness, pain from s/p dental extraction

## 2017-02-28 NOTE — PROVIDER CONTACT NOTE (CHANGE IN STATUS NOTIFICATION) - ACTION/TREATMENT ORDERED:
Tylenol 650 mg given as per order   PLT in progress at this time . will continue to monitor
At 0423 pt. V/S =37.9 ,104 , 156/68 , 18 , 97% . MD notify . Md will see the Pt . Will continue to monitor
hydralazine 5mg ivp given and oral hydralazine 10mg started also cont to monitor B/P
Medication given as ordered . At 0250 BP  157 / 71 , 91 . Will continue to monitor

## 2017-02-28 NOTE — DIETITIAN INITIAL EVALUATION ADULT. - ORAL INTAKE PTA
Per pt's daughter. patient was eating 3 meals a day with some snacking during the day and at night. While at home patient had a good appetite and weight was stabilized at 183# until patient began to lose weight just prior to admittance./good good/Per pt's daughter. patient was eating 3 meals a day with some snacking during the day and at night. While at home patient had a good appetite and weight was stabilized at 183 pounds.

## 2017-02-28 NOTE — DIETITIAN INITIAL EVALUATION ADULT. - PROBLEM SELECTOR PLAN 1
-With tachycardia (119), fever (102.9), lactate 2.2 , tachypnea (22), leukopenic  -not hypotensive & satting 99%O2 on RA  -Bcx collected,  Ucx, UA, RVP, EKG  ordered  -received Vancomycin 1g and Cefepime 2g  -Would continue Cefepime 2g Q8 for Neutropenic fever as discussed with hematology service  -no clear indication for vanc at this time but if continues to spike and HD unstable, can restart vanc  -Tylenol and cooling blanket for fever  -bolus if hypotensive, for now c/w NS @ 40cc/hr

## 2017-02-28 NOTE — DIETITIAN INITIAL EVALUATION ADULT. - PROBLEM SELECTOR PLAN 4
- stable  -Would continue Doxazosin to prevent obstruction however consider holding if becomes hypotensive give alpha effects

## 2017-02-28 NOTE — DIETITIAN INITIAL EVALUATION ADULT. - SIGNS/SYMPTOMS
as evidenced by patient pain s/p dental extraction, weight loss of 6# in 1.5 weeks, poor PO intake weight loss of 5.6 pounds in 1.5 weeks, poor PO intake

## 2017-02-28 NOTE — DIETITIAN INITIAL EVALUATION ADULT. - PROBLEM SELECTOR PLAN 2
-Neutropenia is 2/2 AML and chemo  -Manage as above  -Transfuse plt to keep > 15,000   -continue with fluconazole for ppx

## 2017-02-28 NOTE — PROVIDER CONTACT NOTE (CHANGE IN STATUS NOTIFICATION) - BACKGROUND
receive Tylenol 650 mg  po at 2119 for gums pain . PLT transfusion  started at 2355 . V/S stable at this time

## 2017-02-28 NOTE — DIETITIAN INITIAL EVALUATION ADULT. - FACTORS AFF FOOD INTAKE
difficulty chewing/pain/Patient has had poor po intake since dental extraction on 2/27.  Per daughter- patient is in a lot of pain and eaten very little since.

## 2017-02-28 NOTE — DIETITIAN INITIAL EVALUATION ADULT. - NS AS NUTRI INTERV MEDICAL AND FOOD SUPPLEMENTS2
Commercial food/Provide Ensure Pudding 2x daily (340kcal, 8g PRO).  Continue provision of ensure enlive 8oz. (350kcal, 20g PRO)

## 2017-02-28 NOTE — PROVIDER CONTACT NOTE (CHANGE IN STATUS NOTIFICATION) - SITUATION
Pt. s/p 1 unit of PLT   transfusion . 30 min post V/S  36.6 , 100 , 156/75 ,18 98% . At this time pt c/o's of HA . He stated that HA started before PLT transfusion

## 2017-02-28 NOTE — DIETITIAN INITIAL EVALUATION ADULT. - NS FNS SUPPLEMENTS
Ensure pudding®/2x daily provides (340kcal, 8g PRO) Enlive 1x daily, pudding 2x daily/Ensure Enlive®/Ensure pudding®

## 2017-02-28 NOTE — DIETITIAN INITIAL EVALUATION ADULT. - NS AS NUTRI INTERV MEALS SNACK2
Texture-modified diet/Other (specify)/Continue provision of soft diet. If patient does not improve, recommend provision of mechanical soft/ puree diet as medically feasible.

## 2017-02-28 NOTE — PROVIDER CONTACT NOTE (CHANGE IN STATUS NOTIFICATION) - RECOMMENDATIONS
V/s to be rechecked in1hr
10 Pm BP medication shira given . Pt on Hydralazine 25 mg
Single donor PLT to  be transfused  over 3 HR .   Tylenol 650 mg to be given

## 2017-02-28 NOTE — DIETITIAN INITIAL EVALUATION ADULT. - ENERGY NEEDS
height 64 inches  weight 177 pounds (2/22, standing)  BMI 32.0  IBW +/- 10% 130#, %  BLE +2 pedal edema, no skin breakdown or pressure ulcers noted height 64 inches  weight 177.4 pounds (2/22, standing)  BMI 32.0  IBW +/- 10% 130#, %  BLE +1 foot and ankle edema, no skin breakdown or pressure ulcers noted

## 2017-02-28 NOTE — DIETITIAN INITIAL EVALUATION ADULT. - OTHER INFO
Initial nutrition assessment received for length of stay. 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 Patient was asleep and in pain at time of interview- daughter was interviewed at bedside.  According to daughter, patient has not been able to tolerate soft diet but declined changing diet to mechanical soft with purred vegetables. Per RN- prior to dental procedure on 2/27 patient had a good appetite and was consuming 75-80% of meals. Patient is tolerating Ensure Enlive (350kcal, 20g PRO) and last consumed supplement last night (2/27)- per daughter. Observed unopened Ensure Enlive this morning on patient's table. Per daughter- patient's usual body weight is 183# and had remained stable for several months until the past 1.5 weeks.  Patients weight as of 2/22 is 177#, suggestive of 6 pound weight loss. No reports of any difficulty swallowing or any nausea, vomiting or diarrhea.  Daughter reports there has been some incidences of constipation and believes it is likely related to chemotherapy treatment. NKFA.  Brock's daughter requested pudding for pt, discussed with medical team and will provide Ensure Pudding to increase PO intake. Initial nutrition assessment received for length of stay. 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 Patient was asleep and in pain at time of interview- daughter was interviewed at bedside.  According to daughter, patient has not been able to tolerate soft diet but declined changing diet to mechanical soft with purred vegetables. Per RN- prior to dental procedure on 2/27 patient had a good appetite and was consuming % of meals. Patient is tolerating Ensure Enlive (350kcal, 20g PRO) and last consumed supplement last night (2/27)- per daughter. Observed unopened Ensure Enlive this morning on patient's table. Per daughter- patient's usual body weight is 183 pounds and had remained stable for several months until this admission. Patients standing weight as of 2/22 is 177.4 pounds, suggestive of 5.6 pound weight loss, however, question accuracy of scale as patient had been eating well prior to teeth extraction on (2/27). No reports of any difficulty swallowing or any nausea, vomiting or diarrhea.  Daughter reports there has been some incidences of constipation and believes it is likely related to chemotherapy treatment. NKFA.  Brock's daughter requested pudding for pt, discussed with medical team and will provide Ensure Pudding to increase PO intake.

## 2017-03-01 ENCOUNTER — APPOINTMENT (OUTPATIENT)
Dept: INFUSION THERAPY | Facility: HOSPITAL | Age: 82
End: 2017-03-01

## 2017-03-01 LAB
BLD GP AB SCN SERPL QL: NEGATIVE — SIGNIFICANT CHANGE UP
HCT VFR BLD CALC: 20.3 % — CRITICAL LOW (ref 39–50)
HCT VFR BLD CALC: 21.6 % — LOW (ref 39–50)
HGB BLD-MCNC: 6.9 G/DL — CRITICAL LOW (ref 13–17)
HGB BLD-MCNC: 7.8 G/DL — LOW (ref 13–17)
MCHC RBC-ENTMCNC: 29.5 PG — SIGNIFICANT CHANGE UP (ref 27–34)
MCHC RBC-ENTMCNC: 30.8 PG — SIGNIFICANT CHANGE UP (ref 27–34)
MCHC RBC-ENTMCNC: 34 GM/DL — SIGNIFICANT CHANGE UP (ref 32–36)
MCHC RBC-ENTMCNC: 36 GM/DL — SIGNIFICANT CHANGE UP (ref 32–36)
MCV RBC AUTO: 85.4 FL — SIGNIFICANT CHANGE UP (ref 80–100)
MCV RBC AUTO: 86.8 FL — SIGNIFICANT CHANGE UP (ref 80–100)
PLATELET # BLD AUTO: 14 K/UL — CRITICAL LOW (ref 150–400)
PLATELET # BLD AUTO: 16 K/UL — CRITICAL LOW (ref 150–400)
RBC # BLD: 2.34 M/UL — LOW (ref 4.2–5.8)
RBC # BLD: 2.53 M/UL — LOW (ref 4.2–5.8)
RBC # FLD: 13.1 % — SIGNIFICANT CHANGE UP (ref 10.3–14.5)
RBC # FLD: 13.3 % — SIGNIFICANT CHANGE UP (ref 10.3–14.5)
RH IG SCN BLD-IMP: POSITIVE — SIGNIFICANT CHANGE UP
WBC # BLD: 0.8 K/UL — CRITICAL LOW (ref 3.8–10.5)
WBC # BLD: 0.9 K/UL — CRITICAL LOW (ref 3.8–10.5)
WBC # FLD AUTO: 0.8 K/UL — CRITICAL LOW (ref 3.8–10.5)
WBC # FLD AUTO: 0.9 K/UL — CRITICAL LOW (ref 3.8–10.5)

## 2017-03-01 PROCEDURE — 99233 SBSQ HOSP IP/OBS HIGH 50: CPT | Mod: GC

## 2017-03-01 PROCEDURE — 99232 SBSQ HOSP IP/OBS MODERATE 35: CPT

## 2017-03-01 RX ORDER — HYDRALAZINE HCL 50 MG
1 TABLET ORAL
Qty: 90 | Refills: 0 | OUTPATIENT
Start: 2017-03-01 | End: 2017-03-31

## 2017-03-01 RX ORDER — AMLODIPINE BESYLATE 2.5 MG/1
1 TABLET ORAL
Qty: 30 | Refills: 0 | OUTPATIENT
Start: 2017-03-01 | End: 2017-03-31

## 2017-03-01 RX ORDER — ACETAMINOPHEN 500 MG
650 TABLET ORAL ONCE
Qty: 0 | Refills: 0 | Status: COMPLETED | OUTPATIENT
Start: 2017-03-01 | End: 2017-03-01

## 2017-03-01 RX ORDER — POLYETHYLENE GLYCOL 3350 17 G/17G
17 POWDER, FOR SOLUTION ORAL
Qty: 0 | Refills: 0 | Status: DISCONTINUED | OUTPATIENT
Start: 2017-03-01 | End: 2017-03-02

## 2017-03-01 RX ORDER — DOXAZOSIN MESYLATE 4 MG
1 TABLET ORAL
Qty: 0 | Refills: 0 | COMMUNITY

## 2017-03-01 RX ADMIN — AMLODIPINE BESYLATE 5 MILLIGRAM(S): 2.5 TABLET ORAL at 06:16

## 2017-03-01 RX ADMIN — Medication 650 MILLIGRAM(S): at 07:10

## 2017-03-01 RX ADMIN — CEFTRIAXONE 100 GRAM(S): 500 INJECTION, POWDER, FOR SOLUTION INTRAMUSCULAR; INTRAVENOUS at 18:00

## 2017-03-01 RX ADMIN — Medication 100 MILLIGRAM(S): at 14:49

## 2017-03-01 RX ADMIN — Medication 400 MILLIGRAM(S): at 14:49

## 2017-03-01 RX ADMIN — FLUCONAZOLE 200 MILLIGRAM(S): 150 TABLET ORAL at 11:26

## 2017-03-01 RX ADMIN — Medication 650 MILLIGRAM(S): at 10:46

## 2017-03-01 RX ADMIN — Medication 650 MILLIGRAM(S): at 06:18

## 2017-03-01 RX ADMIN — SENNA PLUS 2 TABLET(S): 8.6 TABLET ORAL at 21:17

## 2017-03-01 RX ADMIN — Medication 100 MILLIGRAM(S): at 21:17

## 2017-03-01 RX ADMIN — POLYETHYLENE GLYCOL 3350 17 GRAM(S): 17 POWDER, FOR SOLUTION ORAL at 18:00

## 2017-03-01 RX ADMIN — LOSARTAN POTASSIUM 100 MILLIGRAM(S): 100 TABLET, FILM COATED ORAL at 06:16

## 2017-03-01 RX ADMIN — Medication 100 MILLIGRAM(S): at 06:16

## 2017-03-01 RX ADMIN — Medication 400 MILLIGRAM(S): at 06:16

## 2017-03-01 RX ADMIN — OXYCODONE HYDROCHLORIDE 5 MILLIGRAM(S): 5 TABLET ORAL at 19:00

## 2017-03-01 RX ADMIN — Medication 400 MILLIGRAM(S): at 21:17

## 2017-03-01 RX ADMIN — OXYCODONE HYDROCHLORIDE 5 MILLIGRAM(S): 5 TABLET ORAL at 18:07

## 2017-03-02 ENCOUNTER — APPOINTMENT (OUTPATIENT)
Dept: INFUSION THERAPY | Facility: HOSPITAL | Age: 82
End: 2017-03-02

## 2017-03-02 VITALS
OXYGEN SATURATION: 94 % | HEART RATE: 91 BPM | SYSTOLIC BLOOD PRESSURE: 113 MMHG | TEMPERATURE: 98 F | RESPIRATION RATE: 18 BRPM | DIASTOLIC BLOOD PRESSURE: 69 MMHG

## 2017-03-02 LAB
HCT VFR BLD CALC: 23.6 % — LOW (ref 39–50)
HGB BLD-MCNC: 7.9 G/DL — LOW (ref 13–17)
MCHC RBC-ENTMCNC: 29 PG — SIGNIFICANT CHANGE UP (ref 27–34)
MCHC RBC-ENTMCNC: 33.4 GM/DL — SIGNIFICANT CHANGE UP (ref 32–36)
MCV RBC AUTO: 86.9 FL — SIGNIFICANT CHANGE UP (ref 80–100)
PLATELET # BLD AUTO: 17 K/UL — CRITICAL LOW (ref 150–400)
RBC # BLD: 2.71 M/UL — LOW (ref 4.2–5.8)
RBC # FLD: 13 % — SIGNIFICANT CHANGE UP (ref 10.3–14.5)
WBC # BLD: 0.9 K/UL — CRITICAL LOW (ref 3.8–10.5)
WBC # FLD AUTO: 0.9 K/UL — CRITICAL LOW (ref 3.8–10.5)

## 2017-03-02 PROCEDURE — 82435 ASSAY OF BLOOD CHLORIDE: CPT

## 2017-03-02 PROCEDURE — 96374 THER/PROPH/DIAG INJ IV PUSH: CPT

## 2017-03-02 PROCEDURE — 84100 ASSAY OF PHOSPHORUS: CPT

## 2017-03-02 PROCEDURE — 82947 ASSAY GLUCOSE BLOOD QUANT: CPT

## 2017-03-02 PROCEDURE — 74176 CT ABD & PELVIS W/O CONTRAST: CPT

## 2017-03-02 PROCEDURE — 84295 ASSAY OF SERUM SODIUM: CPT

## 2017-03-02 PROCEDURE — 86901 BLOOD TYPING SEROLOGIC RH(D): CPT

## 2017-03-02 PROCEDURE — 87798 DETECT AGENT NOS DNA AMP: CPT

## 2017-03-02 PROCEDURE — 85730 THROMBOPLASTIN TIME PARTIAL: CPT

## 2017-03-02 PROCEDURE — 80053 COMPREHEN METABOLIC PANEL: CPT

## 2017-03-02 PROCEDURE — 99285 EMERGENCY DEPT VISIT HI MDM: CPT | Mod: 25

## 2017-03-02 PROCEDURE — P9040: CPT

## 2017-03-02 PROCEDURE — 82803 BLOOD GASES ANY COMBINATION: CPT

## 2017-03-02 PROCEDURE — 71045 X-RAY EXAM CHEST 1 VIEW: CPT

## 2017-03-02 PROCEDURE — 80048 BASIC METABOLIC PNL TOTAL CA: CPT

## 2017-03-02 PROCEDURE — 85014 HEMATOCRIT: CPT

## 2017-03-02 PROCEDURE — 87486 CHLMYD PNEUM DNA AMP PROBE: CPT

## 2017-03-02 PROCEDURE — 87184 SC STD DISK METHOD PER PLATE: CPT

## 2017-03-02 PROCEDURE — 82330 ASSAY OF CALCIUM: CPT

## 2017-03-02 PROCEDURE — 83605 ASSAY OF LACTIC ACID: CPT

## 2017-03-02 PROCEDURE — 70450 CT HEAD/BRAIN W/O DYE: CPT

## 2017-03-02 PROCEDURE — 86880 COOMBS TEST DIRECT: CPT

## 2017-03-02 PROCEDURE — 93306 TTE W/DOPPLER COMPLETE: CPT

## 2017-03-02 PROCEDURE — 99239 HOSP IP/OBS DSCHRG MGMT >30: CPT

## 2017-03-02 PROCEDURE — 87581 M.PNEUMON DNA AMP PROBE: CPT

## 2017-03-02 PROCEDURE — 87086 URINE CULTURE/COLONY COUNT: CPT

## 2017-03-02 PROCEDURE — 86923 COMPATIBILITY TEST ELECTRIC: CPT

## 2017-03-02 PROCEDURE — P9037: CPT

## 2017-03-02 PROCEDURE — 83735 ASSAY OF MAGNESIUM: CPT

## 2017-03-02 PROCEDURE — 87040 BLOOD CULTURE FOR BACTERIA: CPT

## 2017-03-02 PROCEDURE — 93005 ELECTROCARDIOGRAM TRACING: CPT

## 2017-03-02 PROCEDURE — 86900 BLOOD TYPING SEROLOGIC ABO: CPT

## 2017-03-02 PROCEDURE — P9011: CPT

## 2017-03-02 PROCEDURE — 85610 PROTHROMBIN TIME: CPT

## 2017-03-02 PROCEDURE — 84132 ASSAY OF SERUM POTASSIUM: CPT

## 2017-03-02 PROCEDURE — 85027 COMPLETE CBC AUTOMATED: CPT

## 2017-03-02 PROCEDURE — 81003 URINALYSIS AUTO W/O SCOPE: CPT

## 2017-03-02 PROCEDURE — 86813 HLA TYPING A B OR C: CPT

## 2017-03-02 PROCEDURE — 87633 RESP VIRUS 12-25 TARGETS: CPT

## 2017-03-02 PROCEDURE — 97116 GAIT TRAINING THERAPY: CPT

## 2017-03-02 PROCEDURE — 97161 PT EVAL LOW COMPLEX 20 MIN: CPT

## 2017-03-02 PROCEDURE — 86850 RBC ANTIBODY SCREEN: CPT

## 2017-03-02 RX ORDER — OXYCODONE HYDROCHLORIDE 5 MG/1
1 TABLET ORAL
Qty: 20 | Refills: 0 | OUTPATIENT
Start: 2017-03-02 | End: 2017-03-07

## 2017-03-02 RX ADMIN — FLUCONAZOLE 200 MILLIGRAM(S): 150 TABLET ORAL at 12:18

## 2017-03-02 RX ADMIN — Medication 100 MILLIGRAM(S): at 06:46

## 2017-03-02 RX ADMIN — OXYCODONE HYDROCHLORIDE 5 MILLIGRAM(S): 5 TABLET ORAL at 07:20

## 2017-03-02 RX ADMIN — Medication 400 MILLIGRAM(S): at 13:50

## 2017-03-02 RX ADMIN — Medication 25 MILLIGRAM(S): at 13:49

## 2017-03-02 RX ADMIN — POLYETHYLENE GLYCOL 3350 17 GRAM(S): 17 POWDER, FOR SOLUTION ORAL at 06:46

## 2017-03-02 RX ADMIN — OXYCODONE HYDROCHLORIDE 5 MILLIGRAM(S): 5 TABLET ORAL at 06:50

## 2017-03-02 RX ADMIN — LOSARTAN POTASSIUM 100 MILLIGRAM(S): 100 TABLET, FILM COATED ORAL at 06:46

## 2017-03-02 RX ADMIN — Medication 100 MILLIGRAM(S): at 13:50

## 2017-03-02 RX ADMIN — Medication 400 MILLIGRAM(S): at 06:46

## 2017-03-02 RX ADMIN — AMLODIPINE BESYLATE 5 MILLIGRAM(S): 2.5 TABLET ORAL at 06:46

## 2017-03-02 NOTE — PROVIDER CONTACT NOTE (CRITICAL VALUE NOTIFICATION) - ACTION/TREATMENT ORDERED:
MD aware, no new action
order to transfuse 1 unit of PLT
1 unit of PRBC ordered
MD aware, no new action.
MD to order T&S and transfusion.
MD to review and order transfusion.
No new orders at this time. continue to monitor pt
awaiting for orders
continue to monitor
order plt transfusion, waiting for call back from the blood bank to release will notify you when all is set to go. no further changes in treatment
Md Aware .no tx as yet.
awaiting new order
MD aware-states no transfusion needed at this time. Will continue to monitor.
See orders.
Will continue to monitor.
Will fallow order . Will continue to monitor

## 2017-03-02 NOTE — PROVIDER CONTACT NOTE (CRITICAL VALUE NOTIFICATION) - TEST AND RESULT REPORTED:
Blood cultures - growth in aerobic bottles gram positive cocci in pairs and chains
Blood cultures [id number]
CBC from 2/25 Platelets 13
PLT
PLT
Platelet 10
WBC - 0.6  , HT-20.4 , PLT -7
WBC -0.8 , Hgb-7.0 , Ht-19.9 , PLT 21
WBC 0.6, PLT 8
WBC 0.7 plt 5
WBC 0.9 and plt. 17
hb 6.9, hct 20, plt 18
platelet
platelets 8.0
WBC = 0.9, Platelets 10
WBC = 0.8  Platelets = 14
WBC = 0.9  Hg =  6.9  Hc = 20.3  Platelets = 16
WBC=0.6  Platelets = 5

## 2017-03-02 NOTE — PROVIDER CONTACT NOTE (OTHER) - SITUATION
Pt is c/o HA 7/10 pain but he only wants two regular strength Tylenol.
Pt's BP is 178/73.
/62 manually
PT refused Miralax stated that he prefers the colace
Pt b/p is 180/70 and is pending a blood transfusion.
Pt manual /78. Pt c/o RT eye pain and headache
Pt was on acyclovir and colace while in the hospital. Pt was on doxazosin while at home. This med is not reflected on the d/c med rec.
pt admitted with low wbc/platelets/hgb/hct
pt complaining of headache, bp 173/75 p83. requesting stronger pain meds
pt with leukemia
pts bp elevated

## 2017-03-02 NOTE — PROVIDER CONTACT NOTE (CRITICAL VALUE NOTIFICATION) - BACKGROUND
pt DX with neutropenic fever
Admitted for neutropenia, s/p blood transfusion.
Admitted for neutropenic fever
Admitted for neutropenic fever,
Dx: Neutropenic fever last chemo 2/16, sepsis. AML cycle 7 chemo
HX of AML
Last PLT =5
Last type and screen on file is from 2/22
Leukemia
Platelets have been low and pt has been transfused during this admission.
Pt received 2 units of platelets yesterday.
Pt with  PLT at 0633 - 5 ,  WBC -0.6 , Hgb-7.0 , Ht-19.9
dx: neutropenia
instructions to keep platelets above 10. pt free from signs of active
leukemia

## 2017-03-02 NOTE — PROVIDER CONTACT NOTE (OTHER) - DATE AND TIME:
26-Feb-2017 08:35
26-Feb-2017 18:18
23-Feb-2017 12:46
02-Mar-2017 14:52
18-Feb-2017 00:08
19-Feb-2017 12:00
21-Feb-2017 05:30
21-Feb-2017 22:35
22-Feb-2017 23:18
24-Feb-2017 21:27
26-Feb-2017 00:25

## 2017-03-02 NOTE — PROVIDER CONTACT NOTE (CRITICAL VALUE NOTIFICATION) - ASSESSMENT
no s/s of a bleed noted
pt alert and oriented no resps distress
AO x 4. Denies any pain. VSS stable.
AO x 4. No fever at this time.
Alert and oriented times three to four. Not in any form of distress. With hematoma on the right side of the chin but not spreading.
No s/s's of active bleeding
See VS flowsheet. Purpura present. No other s/s of bleeding.
VS as documented, no distress noted
no s/s's  of active bleeding noted

## 2017-03-02 NOTE — PROVIDER CONTACT NOTE (CRITICAL VALUE NOTIFICATION) - PERSON GIVING RESULT:
Betty Govea
Cory Dejesus / Core lab
Jessi Sayed/lab
Jessica lerner
Lesley Fletcher from hematology
[facility] Paran
Ms Mena
Nataliia / Core lab
Radha Canseco
Sherrie Pineda
Thong Esparza
Vangie Dawkins
Xuan/Core Lab
ms Mena
Heaven Crowder
Maureen Valenzuela
Wilder Zhang
Jessi Larsen

## 2017-03-02 NOTE — PROVIDER CONTACT NOTE (OTHER) - NAME OF MD/NP/PA/DO NOTIFIED:
Dr Christensen
Dr Elam team 6
Dr Kahlil Alves
Dr. Billingsley
Dr. Cote
Dr. Fernandez
MD Lanier
MD Yolande
Yolande
MD Lange

## 2017-03-02 NOTE — PROVIDER CONTACT NOTE (CRITICAL VALUE NOTIFICATION) - NAME OF MD/NP/PA/DO NOTIFIED:
Dr Heard
Dr Heard
Dr Lange
Dr Lanier
Dr. Cote
Dr. Elam Team 6
Dr. Lange
MD Anisa
MD Brunson
MD Kahlil Alves ( team 3)
MD Monique
MD maribel Lange
night even teams
Dr. Cote
Kahlil English ( team 6)
Dr. Billingsley

## 2017-03-02 NOTE — PROVIDER CONTACT NOTE (OTHER) - REASON
/62 manually
PT refused Miralax
core lab result
d/c med rec clarification
manual /78, pt c/o of RT sided eye pain
pt c/o very bad headache
pts BP kepps trending up
reaction to platelet transfusion
Blood pressure

## 2017-03-05 ENCOUNTER — RESULT REVIEW (OUTPATIENT)
Age: 82
End: 2017-03-05

## 2017-03-06 ENCOUNTER — APPOINTMENT (OUTPATIENT)
Dept: INFUSION THERAPY | Facility: HOSPITAL | Age: 82
End: 2017-03-06

## 2017-03-06 LAB
BASOPHILS # BLD AUTO: 0 K/UL — SIGNIFICANT CHANGE UP (ref 0–0.2)
BASOPHILS NFR BLD AUTO: 3 % — HIGH (ref 0–2)
BLD GP AB SCN SERPL QL: NEGATIVE — SIGNIFICANT CHANGE UP
EOSINOPHIL # BLD AUTO: 0.1 K/UL — SIGNIFICANT CHANGE UP (ref 0–0.5)
EOSINOPHIL NFR BLD AUTO: 1 % — SIGNIFICANT CHANGE UP (ref 0–6)
HCT VFR BLD CALC: 20.9 % — CRITICAL LOW (ref 39–50)
HGB BLD-MCNC: 7.3 G/DL — LOW (ref 13–17)
LYMPHOCYTES # BLD AUTO: 0.9 K/UL — LOW (ref 1–3.3)
LYMPHOCYTES # BLD AUTO: 87 % — HIGH (ref 13–44)
MCHC RBC-ENTMCNC: 29.8 PG — SIGNIFICANT CHANGE UP (ref 27–34)
MCHC RBC-ENTMCNC: 34.8 G/DL — SIGNIFICANT CHANGE UP (ref 32–36)
MCV RBC AUTO: 85.5 FL — SIGNIFICANT CHANGE UP (ref 80–100)
MONOCYTES # BLD AUTO: 0 K/UL — SIGNIFICANT CHANGE UP (ref 0–0.9)
NEUTROPHILS # BLD AUTO: 0.1 K/UL — LOW (ref 1.8–7.4)
NEUTROPHILS NFR BLD AUTO: 9 % — LOW (ref 43–77)
PLAT MORPH BLD: NORMAL — SIGNIFICANT CHANGE UP
PLATELET # BLD AUTO: 12 K/UL — CRITICAL LOW (ref 150–400)
RBC # BLD: 2.45 M/UL — LOW (ref 4.2–5.8)
RBC # FLD: 12.9 % — SIGNIFICANT CHANGE UP (ref 10.3–14.5)
RBC BLD AUTO: SIGNIFICANT CHANGE UP
RH IG SCN BLD-IMP: POSITIVE — SIGNIFICANT CHANGE UP
WBC # BLD: 1.1 K/UL — LOW (ref 3.8–10.5)
WBC # FLD AUTO: 1.1 K/UL — LOW (ref 3.8–10.5)

## 2017-03-07 ENCOUNTER — RESULT REVIEW (OUTPATIENT)
Age: 82
End: 2017-03-07

## 2017-03-07 DIAGNOSIS — Z51.89 ENCOUNTER FOR OTHER SPECIFIED AFTERCARE: ICD-10-CM

## 2017-03-07 DIAGNOSIS — R11.2 NAUSEA WITH VOMITING, UNSPECIFIED: ICD-10-CM

## 2017-03-08 ENCOUNTER — APPOINTMENT (OUTPATIENT)
Dept: HEMATOLOGY ONCOLOGY | Facility: CLINIC | Age: 82
End: 2017-03-08

## 2017-03-08 ENCOUNTER — APPOINTMENT (OUTPATIENT)
Dept: INFUSION THERAPY | Facility: HOSPITAL | Age: 82
End: 2017-03-08

## 2017-03-08 VITALS
TEMPERATURE: 97.7 F | OXYGEN SATURATION: 98 % | DIASTOLIC BLOOD PRESSURE: 62 MMHG | RESPIRATION RATE: 16 BRPM | WEIGHT: 171.74 LBS | BODY MASS INDEX: 28.96 KG/M2 | SYSTOLIC BLOOD PRESSURE: 117 MMHG | HEART RATE: 71 BPM

## 2017-03-08 LAB
BLASTS # FLD: 1 % — HIGH (ref 0–0)
EOSINOPHIL # BLD AUTO: 0.1 K/UL — SIGNIFICANT CHANGE UP (ref 0–0.5)
EOSINOPHIL NFR BLD AUTO: 4 % — SIGNIFICANT CHANGE UP (ref 0–6)
HCT VFR BLD CALC: 19.7 % — CRITICAL LOW (ref 39–50)
HGB BLD-MCNC: 6.9 G/DL — CRITICAL LOW (ref 13–17)
LYMPHOCYTES # BLD AUTO: 0.8 K/UL — LOW (ref 1–3.3)
LYMPHOCYTES # BLD AUTO: 84 % — HIGH (ref 13–44)
MCHC RBC-ENTMCNC: 29.8 PG — SIGNIFICANT CHANGE UP (ref 27–34)
MCHC RBC-ENTMCNC: 35.1 G/DL — SIGNIFICANT CHANGE UP (ref 32–36)
MCV RBC AUTO: 85 FL — SIGNIFICANT CHANGE UP (ref 80–100)
MONOCYTES # BLD AUTO: 0.1 K/UL — SIGNIFICANT CHANGE UP (ref 0–0.9)
MONOCYTES NFR BLD AUTO: 4 % — SIGNIFICANT CHANGE UP (ref 2–14)
NEUTROPHILS # BLD AUTO: 0.1 K/UL — LOW (ref 1.8–7.4)
NEUTROPHILS NFR BLD AUTO: 7 % — LOW (ref 43–77)
PLAT MORPH BLD: NORMAL — SIGNIFICANT CHANGE UP
PLATELET # BLD AUTO: 20 K/UL — CRITICAL LOW (ref 150–400)
PLATELET # BLD MANUAL: 22 K/UL — LOW (ref 150–400)
RBC # BLD: 2.32 M/UL — LOW (ref 4.2–5.8)
RBC # FLD: 12.7 % — SIGNIFICANT CHANGE UP (ref 10.3–14.5)
RBC BLD AUTO: SIGNIFICANT CHANGE UP
WBC # BLD: 1 K/UL — CRITICAL LOW (ref 3.8–10.5)
WBC # FLD AUTO: 1 K/UL — CRITICAL LOW (ref 3.8–10.5)

## 2017-03-08 RX ORDER — LOSARTAN POTASSIUM AND HYDROCHLOROTHIAZIDE 25; 100 MG/1; MG/1
100-25 TABLET ORAL DAILY
Refills: 0 | Status: ACTIVE | COMMUNITY
Start: 2017-03-08

## 2017-03-09 ENCOUNTER — APPOINTMENT (OUTPATIENT)
Dept: INFUSION THERAPY | Facility: HOSPITAL | Age: 82
End: 2017-03-09

## 2017-03-12 ENCOUNTER — RESULT REVIEW (OUTPATIENT)
Age: 82
End: 2017-03-12

## 2017-03-13 ENCOUNTER — APPOINTMENT (OUTPATIENT)
Dept: HEMATOLOGY ONCOLOGY | Facility: CLINIC | Age: 82
End: 2017-03-13

## 2017-03-13 ENCOUNTER — APPOINTMENT (OUTPATIENT)
Dept: INFUSION THERAPY | Facility: HOSPITAL | Age: 82
End: 2017-03-13

## 2017-03-13 LAB
ANISOCYTOSIS BLD QL: SLIGHT — SIGNIFICANT CHANGE UP
BASO STIPL BLD QL SMEAR: PRESENT — SIGNIFICANT CHANGE UP
BASOPHILS # BLD AUTO: 0 K/UL — SIGNIFICANT CHANGE UP (ref 0–0.2)
BASOPHILS NFR BLD AUTO: 2 % — SIGNIFICANT CHANGE UP (ref 0–2)
BLASTS # FLD: 2 % — HIGH (ref 0–0)
EOSINOPHIL # BLD AUTO: 0.1 K/UL — SIGNIFICANT CHANGE UP (ref 0–0.5)
EOSINOPHIL NFR BLD AUTO: 1 % — SIGNIFICANT CHANGE UP (ref 0–6)
HCT VFR BLD CALC: 23.4 % — LOW (ref 39–50)
HGB BLD-MCNC: 8.3 G/DL — LOW (ref 13–17)
HYPOCHROMIA BLD QL: SLIGHT — SIGNIFICANT CHANGE UP
LYMPHOCYTES # BLD AUTO: 1.1 K/UL — SIGNIFICANT CHANGE UP (ref 1–3.3)
LYMPHOCYTES # BLD AUTO: 79 % — HIGH (ref 13–44)
MACROCYTES BLD QL: SLIGHT — SIGNIFICANT CHANGE UP
MCHC RBC-ENTMCNC: 30 PG — SIGNIFICANT CHANGE UP (ref 27–34)
MCHC RBC-ENTMCNC: 35.3 G/DL — SIGNIFICANT CHANGE UP (ref 32–36)
MCV RBC AUTO: 85 FL — SIGNIFICANT CHANGE UP (ref 80–100)
MONOCYTES # BLD AUTO: 0 K/UL — SIGNIFICANT CHANGE UP (ref 0–0.9)
MONOCYTES NFR BLD AUTO: 5 % — SIGNIFICANT CHANGE UP (ref 2–14)
NEUTROPHILS # BLD AUTO: 0.2 K/UL — LOW (ref 1.8–7.4)
NEUTROPHILS NFR BLD AUTO: 10 % — LOW (ref 43–77)
NEUTS BAND # BLD: 1 % — SIGNIFICANT CHANGE UP (ref 0–8)
OVALOCYTES BLD QL SMEAR: SLIGHT — SIGNIFICANT CHANGE UP
PLAT MORPH BLD: NORMAL — SIGNIFICANT CHANGE UP
PLATELET # BLD AUTO: 12 K/UL — CRITICAL LOW (ref 150–400)
PLATELET # BLD MANUAL: 21 K/UL — LOW (ref 150–400)
POIKILOCYTOSIS BLD QL AUTO: SLIGHT — SIGNIFICANT CHANGE UP
RBC # BLD: 2.75 M/UL — LOW (ref 4.2–5.8)
RBC # FLD: 12.9 % — SIGNIFICANT CHANGE UP (ref 10.3–14.5)
RBC BLD AUTO: ABNORMAL
WBC # BLD: 1.3 K/UL — LOW (ref 3.8–10.5)
WBC # FLD AUTO: 1.3 K/UL — LOW (ref 3.8–10.5)

## 2017-03-14 ENCOUNTER — APPOINTMENT (OUTPATIENT)
Dept: INFUSION THERAPY | Facility: HOSPITAL | Age: 82
End: 2017-03-14

## 2017-03-15 ENCOUNTER — APPOINTMENT (OUTPATIENT)
Dept: INFUSION THERAPY | Facility: HOSPITAL | Age: 82
End: 2017-03-15

## 2017-03-15 ENCOUNTER — RESULT REVIEW (OUTPATIENT)
Age: 82
End: 2017-03-15

## 2017-03-16 ENCOUNTER — APPOINTMENT (OUTPATIENT)
Dept: INFUSION THERAPY | Facility: HOSPITAL | Age: 82
End: 2017-03-16

## 2017-03-16 ENCOUNTER — MEDICATION RENEWAL (OUTPATIENT)
Age: 82
End: 2017-03-16

## 2017-03-16 LAB
BLASTS # FLD: 1 % — HIGH (ref 0–0)
BLD GP AB SCN SERPL QL: NEGATIVE — SIGNIFICANT CHANGE UP
EOSINOPHIL # BLD AUTO: 0 K/UL — SIGNIFICANT CHANGE UP (ref 0–0.5)
EOSINOPHIL NFR BLD AUTO: 4 % — SIGNIFICANT CHANGE UP (ref 0–6)
HCT VFR BLD CALC: 22.2 % — LOW (ref 39–50)
HGB BLD-MCNC: 7.9 G/DL — LOW (ref 13–17)
LYMPHOCYTES # BLD AUTO: 1.3 K/UL — SIGNIFICANT CHANGE UP (ref 1–3.3)
LYMPHOCYTES # BLD AUTO: 59 % — HIGH (ref 13–44)
MCHC RBC-ENTMCNC: 30.3 PG — SIGNIFICANT CHANGE UP (ref 27–34)
MCHC RBC-ENTMCNC: 35.5 G/DL — SIGNIFICANT CHANGE UP (ref 32–36)
MCV RBC AUTO: 85.4 FL — SIGNIFICANT CHANGE UP (ref 80–100)
MONOCYTES # BLD AUTO: 0 K/UL — SIGNIFICANT CHANGE UP (ref 0–0.9)
MONOCYTES NFR BLD AUTO: 4 % — SIGNIFICANT CHANGE UP (ref 2–14)
NEUTROPHILS # BLD AUTO: 0.6 K/UL — LOW (ref 1.8–7.4)
NEUTROPHILS NFR BLD AUTO: 30 % — LOW (ref 43–77)
NEUTS BAND # BLD: 2 % — SIGNIFICANT CHANGE UP (ref 0–8)
PLAT MORPH BLD: NORMAL — SIGNIFICANT CHANGE UP
PLATELET # BLD AUTO: 16 K/UL — CRITICAL LOW (ref 150–400)
RBC # BLD: 2.6 M/UL — LOW (ref 4.2–5.8)
RBC # FLD: 13.1 % — SIGNIFICANT CHANGE UP (ref 10.3–14.5)
RBC BLD AUTO: SIGNIFICANT CHANGE UP
RH IG SCN BLD-IMP: POSITIVE — SIGNIFICANT CHANGE UP
WBC # BLD: 1.9 K/UL — LOW (ref 3.8–10.5)
WBC # FLD AUTO: 1.9 K/UL — LOW (ref 3.8–10.5)

## 2017-03-17 ENCOUNTER — RESULT REVIEW (OUTPATIENT)
Age: 82
End: 2017-03-17

## 2017-03-17 ENCOUNTER — APPOINTMENT (OUTPATIENT)
Dept: INFUSION THERAPY | Facility: HOSPITAL | Age: 82
End: 2017-03-17

## 2017-03-18 ENCOUNTER — APPOINTMENT (OUTPATIENT)
Dept: INFUSION THERAPY | Facility: HOSPITAL | Age: 82
End: 2017-03-18

## 2017-03-18 LAB
BASOPHILS # BLD AUTO: 0 K/UL — SIGNIFICANT CHANGE UP (ref 0–0.2)
BLASTS # FLD: 1 % — HIGH (ref 0–0)
EOSINOPHIL # BLD AUTO: 0 K/UL — SIGNIFICANT CHANGE UP (ref 0–0.5)
EOSINOPHIL NFR BLD AUTO: 1 % — SIGNIFICANT CHANGE UP (ref 0–6)
HCT VFR BLD CALC: 20.6 % — CRITICAL LOW (ref 39–50)
HGB BLD-MCNC: 7.2 G/DL — LOW (ref 13–17)
LYMPHOCYTES # BLD AUTO: 0.7 K/UL — LOW (ref 1–3.3)
LYMPHOCYTES # BLD AUTO: 70 % — HIGH (ref 13–44)
MCHC RBC-ENTMCNC: 30.1 PG — SIGNIFICANT CHANGE UP (ref 27–34)
MCHC RBC-ENTMCNC: 35 G/DL — SIGNIFICANT CHANGE UP (ref 32–36)
MCV RBC AUTO: 86 FL — SIGNIFICANT CHANGE UP (ref 80–100)
MONOCYTES # BLD AUTO: 0 K/UL — SIGNIFICANT CHANGE UP (ref 0–0.9)
NEUTROPHILS # BLD AUTO: 0.3 K/UL — LOW (ref 1.8–7.4)
NEUTROPHILS NFR BLD AUTO: 27 % — LOW (ref 43–77)
NEUTS BAND # BLD: 1 % — SIGNIFICANT CHANGE UP (ref 0–8)
PLAT MORPH BLD: NORMAL — SIGNIFICANT CHANGE UP
PLATELET # BLD AUTO: 11 K/UL — CRITICAL LOW (ref 150–400)
PLATELET # BLD MANUAL: 17 K/UL — CRITICAL LOW (ref 150–400)
RBC # BLD: 2.39 M/UL — LOW (ref 4.2–5.8)
RBC # FLD: 12.9 % — SIGNIFICANT CHANGE UP (ref 10.3–14.5)
RBC BLD AUTO: SIGNIFICANT CHANGE UP
WBC # BLD: 1 K/UL — CRITICAL LOW (ref 3.8–10.5)
WBC # FLD AUTO: 1 K/UL — CRITICAL LOW (ref 3.8–10.5)

## 2017-03-19 ENCOUNTER — RESULT REVIEW (OUTPATIENT)
Age: 82
End: 2017-03-19

## 2017-03-20 ENCOUNTER — APPOINTMENT (OUTPATIENT)
Dept: INFUSION THERAPY | Facility: HOSPITAL | Age: 82
End: 2017-03-20

## 2017-03-20 LAB
BASOPHILS # BLD AUTO: 0 K/UL — SIGNIFICANT CHANGE UP (ref 0–0.2)
BLASTS # FLD: 4 % — HIGH (ref 0–0)
EOSINOPHIL # BLD AUTO: 0.1 K/UL — SIGNIFICANT CHANGE UP (ref 0–0.5)
HCT VFR BLD CALC: 22.3 % — LOW (ref 39–50)
HGB BLD-MCNC: 8.1 G/DL — LOW (ref 13–17)
LYMPHOCYTES # BLD AUTO: 0.7 K/UL — LOW (ref 1–3.3)
LYMPHOCYTES # BLD AUTO: 72 % — HIGH (ref 13–44)
MCHC RBC-ENTMCNC: 30.7 PG — SIGNIFICANT CHANGE UP (ref 27–34)
MCHC RBC-ENTMCNC: 36.2 G/DL — HIGH (ref 32–36)
MCV RBC AUTO: 84.8 FL — SIGNIFICANT CHANGE UP (ref 80–100)
METAMYELOCYTES # FLD: 4 % — HIGH (ref 0–0)
MONOCYTES # BLD AUTO: 0 K/UL — SIGNIFICANT CHANGE UP (ref 0–0.9)
MONOCYTES NFR BLD AUTO: 2 % — SIGNIFICANT CHANGE UP (ref 2–14)
NEUTROPHILS # BLD AUTO: 0.2 K/UL — LOW (ref 1.8–7.4)
NEUTROPHILS NFR BLD AUTO: 18 % — LOW (ref 43–77)
PLAT MORPH BLD: NORMAL — SIGNIFICANT CHANGE UP
PLATELET # BLD AUTO: 10 K/UL — CRITICAL LOW (ref 150–400)
PLATELET # BLD MANUAL: 62 K/UL — LOW (ref 150–400)
RBC # BLD: 2.62 M/UL — LOW (ref 4.2–5.8)
RBC # FLD: 13.4 % — SIGNIFICANT CHANGE UP (ref 10.3–14.5)
RBC BLD AUTO: SIGNIFICANT CHANGE UP
WBC # BLD: 1 K/UL — CRITICAL LOW (ref 3.8–10.5)
WBC # FLD AUTO: 1 K/UL — CRITICAL LOW (ref 3.8–10.5)

## 2017-03-22 ENCOUNTER — RESULT REVIEW (OUTPATIENT)
Age: 82
End: 2017-03-22

## 2017-03-23 ENCOUNTER — APPOINTMENT (OUTPATIENT)
Dept: INFUSION THERAPY | Facility: HOSPITAL | Age: 82
End: 2017-03-23

## 2017-03-23 LAB
BLD GP AB SCN SERPL QL: NEGATIVE — SIGNIFICANT CHANGE UP
HCT VFR BLD CALC: 21.6 % — LOW (ref 39–50)
HGB BLD-MCNC: 7.7 G/DL — LOW (ref 13–17)
LYMPHOCYTES # BLD AUTO: 80 % — HIGH (ref 13–44)
LYMPHOCYTES # BLD AUTO: SIGNIFICANT CHANGE UP (ref 1–3.3)
MCHC RBC-ENTMCNC: 30.1 PG — SIGNIFICANT CHANGE UP (ref 27–34)
MCHC RBC-ENTMCNC: 35.4 G/DL — SIGNIFICANT CHANGE UP (ref 32–36)
MCV RBC AUTO: 84.9 FL — SIGNIFICANT CHANGE UP (ref 80–100)
METAMYELOCYTES # FLD: 4 % — HIGH (ref 0–0)
NEUTROPHILS # BLD AUTO: 0.4 K/UL — LOW (ref 1.8–7.4)
NEUTROPHILS NFR BLD AUTO: 16 % — LOW (ref 43–77)
PLAT MORPH BLD: NORMAL — SIGNIFICANT CHANGE UP
PLATELET # BLD AUTO: 30 K/UL — LOW (ref 150–400)
RBC # BLD: 2.55 M/UL — LOW (ref 4.2–5.8)
RBC # FLD: 13.3 % — SIGNIFICANT CHANGE UP (ref 10.3–14.5)
RBC BLD AUTO: SIGNIFICANT CHANGE UP
RH IG SCN BLD-IMP: POSITIVE — SIGNIFICANT CHANGE UP
WBC # BLD: 1.1 K/UL — LOW (ref 3.8–10.5)
WBC # FLD AUTO: 1.1 K/UL — LOW (ref 3.8–10.5)

## 2017-03-24 ENCOUNTER — OUTPATIENT (OUTPATIENT)
Dept: OUTPATIENT SERVICES | Facility: HOSPITAL | Age: 82
LOS: 1 days | Discharge: HOME | End: 2017-03-24

## 2017-03-24 DIAGNOSIS — C95.00 ACUTE LEUKEMIA OF UNSPECIFIED CELL TYPE NOT HAVING ACHIEVED REMISSION: ICD-10-CM

## 2017-03-24 PROCEDURE — 86850 RBC ANTIBODY SCREEN: CPT

## 2017-03-24 PROCEDURE — 86923 COMPATIBILITY TEST ELECTRIC: CPT

## 2017-03-24 PROCEDURE — 86901 BLOOD TYPING SEROLOGIC RH(D): CPT

## 2017-03-24 PROCEDURE — 86900 BLOOD TYPING SEROLOGIC ABO: CPT

## 2017-03-25 ENCOUNTER — APPOINTMENT (OUTPATIENT)
Dept: INFUSION THERAPY | Facility: HOSPITAL | Age: 82
End: 2017-03-25

## 2017-03-26 ENCOUNTER — RESULT REVIEW (OUTPATIENT)
Age: 82
End: 2017-03-26

## 2017-03-27 ENCOUNTER — APPOINTMENT (OUTPATIENT)
Dept: HEMATOLOGY ONCOLOGY | Facility: CLINIC | Age: 82
End: 2017-03-27

## 2017-03-27 ENCOUNTER — OUTPATIENT (OUTPATIENT)
Dept: OUTPATIENT SERVICES | Facility: HOSPITAL | Age: 82
LOS: 1 days | End: 2017-03-27
Payer: MEDICARE

## 2017-03-27 ENCOUNTER — APPOINTMENT (OUTPATIENT)
Dept: INFUSION THERAPY | Facility: HOSPITAL | Age: 82
End: 2017-03-27

## 2017-03-27 VITALS
HEART RATE: 72 BPM | OXYGEN SATURATION: 97 % | WEIGHT: 163.14 LBS | BODY MASS INDEX: 27.51 KG/M2 | DIASTOLIC BLOOD PRESSURE: 60 MMHG | TEMPERATURE: 97.8 F | RESPIRATION RATE: 16 BRPM | SYSTOLIC BLOOD PRESSURE: 110 MMHG

## 2017-03-27 DIAGNOSIS — C95.00 ACUTE LEUKEMIA OF UNSPECIFIED CELL TYPE NOT HAVING ACHIEVED REMISSION: ICD-10-CM

## 2017-03-27 DIAGNOSIS — Z86.2 PERSONAL HISTORY OF DISEASES OF THE BLOOD AND BLOOD-FORMING ORGANS AND CERTAIN DISORDERS INVOLVING THE IMMUNE MECHANISM: ICD-10-CM

## 2017-03-27 LAB
EOSINOPHIL # BLD AUTO: 0.1 K/UL — SIGNIFICANT CHANGE UP (ref 0–0.5)
EOSINOPHIL NFR BLD AUTO: 1 % — SIGNIFICANT CHANGE UP (ref 0–6)
HCT VFR BLD CALC: 22.3 % — LOW (ref 39–50)
HGB BLD-MCNC: 7.8 G/DL — LOW (ref 13–17)
LYMPHOCYTES # BLD AUTO: 0.8 K/UL — LOW (ref 1–3.3)
LYMPHOCYTES # BLD AUTO: 64 % — HIGH (ref 13–44)
MCHC RBC-ENTMCNC: 29.9 PG — SIGNIFICANT CHANGE UP (ref 27–34)
MCHC RBC-ENTMCNC: 35.1 G/DL — SIGNIFICANT CHANGE UP (ref 32–36)
MCV RBC AUTO: 85.3 FL — SIGNIFICANT CHANGE UP (ref 80–100)
MONOCYTES # BLD AUTO: 0 K/UL — SIGNIFICANT CHANGE UP (ref 0–0.9)
MONOCYTES NFR BLD AUTO: 4 % — SIGNIFICANT CHANGE UP (ref 2–14)
NEUTROPHILS # BLD AUTO: 0.3 K/UL — LOW (ref 1.8–7.4)
NEUTROPHILS NFR BLD AUTO: 31 % — LOW (ref 43–77)
PLAT MORPH BLD: NORMAL — SIGNIFICANT CHANGE UP
PLATELET # BLD AUTO: 9 K/UL — CRITICAL LOW (ref 150–400)
PLATELET # BLD MANUAL: 8 K/UL — CRITICAL LOW (ref 150–400)
RBC # BLD: 2.62 M/UL — LOW (ref 4.2–5.8)
RBC # FLD: 13.2 % — SIGNIFICANT CHANGE UP (ref 10.3–14.5)
RBC BLD AUTO: SIGNIFICANT CHANGE UP
WBC # BLD: 1.1 K/UL — LOW (ref 3.8–10.5)
WBC # FLD AUTO: 1.1 K/UL — LOW (ref 3.8–10.5)

## 2017-03-27 RX ORDER — AMLODIPINE BESYLATE 5 MG/1
5 TABLET ORAL DAILY
Qty: 90 | Refills: 3 | Status: DISCONTINUED | COMMUNITY
Start: 2017-03-08 | End: 2017-03-27

## 2017-03-28 DIAGNOSIS — Z51.89 ENCOUNTER FOR OTHER SPECIFIED AFTERCARE: ICD-10-CM

## 2017-03-28 DIAGNOSIS — R11.2 NAUSEA WITH VOMITING, UNSPECIFIED: ICD-10-CM

## 2017-03-29 ENCOUNTER — RESULT REVIEW (OUTPATIENT)
Age: 82
End: 2017-03-29

## 2017-03-30 ENCOUNTER — APPOINTMENT (OUTPATIENT)
Dept: INFUSION THERAPY | Facility: HOSPITAL | Age: 82
End: 2017-03-30

## 2017-03-30 LAB
BLASTS # FLD: 1 % — HIGH (ref 0–0)
BLD GP AB SCN SERPL QL: NEGATIVE — SIGNIFICANT CHANGE UP
EOSINOPHIL # BLD AUTO: 0.1 K/UL — SIGNIFICANT CHANGE UP (ref 0–0.5)
EOSINOPHIL NFR BLD AUTO: 2 % — SIGNIFICANT CHANGE UP (ref 0–6)
HCT VFR BLD CALC: 21.4 % — LOW (ref 39–50)
HGB BLD-MCNC: 7.7 G/DL — LOW (ref 13–17)
LYMPHOCYTES # BLD AUTO: 0.5 K/UL — LOW (ref 1–3.3)
LYMPHOCYTES # BLD AUTO: 76 % — HIGH (ref 13–44)
MCHC RBC-ENTMCNC: 30.5 PG — SIGNIFICANT CHANGE UP (ref 27–34)
MCHC RBC-ENTMCNC: 35.8 G/DL — SIGNIFICANT CHANGE UP (ref 32–36)
MCV RBC AUTO: 85.4 FL — SIGNIFICANT CHANGE UP (ref 80–100)
MONOCYTES # BLD AUTO: 0 K/UL — SIGNIFICANT CHANGE UP (ref 0–0.9)
MONOCYTES NFR BLD AUTO: 3 % — SIGNIFICANT CHANGE UP (ref 2–14)
NEUTROPHILS # BLD AUTO: 0.2 K/UL — LOW (ref 1.8–7.4)
NEUTROPHILS NFR BLD AUTO: 18 % — LOW (ref 43–77)
PLAT MORPH BLD: NORMAL — SIGNIFICANT CHANGE UP
PLATELET # BLD AUTO: 6 K/UL — CRITICAL LOW (ref 150–400)
RBC # BLD: 2.51 M/UL — LOW (ref 4.2–5.8)
RBC # FLD: 13.3 % — SIGNIFICANT CHANGE UP (ref 10.3–14.5)
RBC BLD AUTO: SIGNIFICANT CHANGE UP
RH IG SCN BLD-IMP: POSITIVE — SIGNIFICANT CHANGE UP
WBC # BLD: 0.9 K/UL — CRITICAL LOW (ref 3.8–10.5)
WBC # FLD AUTO: 0.9 K/UL — CRITICAL LOW (ref 3.8–10.5)

## 2017-03-31 PROCEDURE — 86850 RBC ANTIBODY SCREEN: CPT

## 2017-03-31 PROCEDURE — 86901 BLOOD TYPING SEROLOGIC RH(D): CPT

## 2017-03-31 PROCEDURE — 86900 BLOOD TYPING SEROLOGIC ABO: CPT

## 2017-03-31 PROCEDURE — 86923 COMPATIBILITY TEST ELECTRIC: CPT

## 2017-04-01 ENCOUNTER — APPOINTMENT (OUTPATIENT)
Dept: INFUSION THERAPY | Facility: HOSPITAL | Age: 82
End: 2017-04-01

## 2017-04-02 ENCOUNTER — RESULT REVIEW (OUTPATIENT)
Age: 82
End: 2017-04-02

## 2017-04-03 ENCOUNTER — APPOINTMENT (OUTPATIENT)
Dept: INFUSION THERAPY | Facility: HOSPITAL | Age: 82
End: 2017-04-03

## 2017-04-03 LAB
BLASTS # FLD: 2 % — HIGH (ref 0–0)
EOSINOPHIL # BLD AUTO: 0.1 K/UL — SIGNIFICANT CHANGE UP (ref 0–0.5)
HCT VFR BLD CALC: 30.9 % — LOW (ref 39–50)
HGB BLD-MCNC: 9.6 G/DL — LOW (ref 13–17)
LYMPHOCYTES # BLD AUTO: 0.4 K/UL — LOW (ref 1–3.3)
LYMPHOCYTES # BLD AUTO: 70 % — HIGH (ref 13–44)
MCHC RBC-ENTMCNC: 26.3 PG — LOW (ref 27–34)
MCHC RBC-ENTMCNC: 31 G/DL — LOW (ref 32–36)
MCV RBC AUTO: 84.8 FL — SIGNIFICANT CHANGE UP (ref 80–100)
MONOCYTES NFR BLD AUTO: 2 % — SIGNIFICANT CHANGE UP (ref 2–14)
NEUTROPHILS # BLD AUTO: 0.2 K/UL — LOW (ref 1.8–7.4)
NEUTROPHILS NFR BLD AUTO: 26 % — LOW (ref 43–77)
PLAT MORPH BLD: NORMAL — SIGNIFICANT CHANGE UP
PLATELET # BLD AUTO: 7 K/UL — CRITICAL LOW (ref 150–400)
RBC # BLD: 3.65 M/UL — LOW (ref 4.2–5.8)
RBC # FLD: 13.2 % — SIGNIFICANT CHANGE UP (ref 10.3–14.5)
RBC BLD AUTO: SIGNIFICANT CHANGE UP
WBC # BLD: 0.8 K/UL — CRITICAL LOW (ref 3.8–10.5)
WBC # FLD AUTO: 0.8 K/UL — CRITICAL LOW (ref 3.8–10.5)

## 2017-04-04 DIAGNOSIS — Z51.11 ENCOUNTER FOR ANTINEOPLASTIC CHEMOTHERAPY: ICD-10-CM

## 2017-04-05 ENCOUNTER — RESULT REVIEW (OUTPATIENT)
Age: 82
End: 2017-04-05

## 2017-04-05 ENCOUNTER — FORM ENCOUNTER (OUTPATIENT)
Age: 82
End: 2017-04-05

## 2017-04-06 ENCOUNTER — INPATIENT (INPATIENT)
Facility: HOSPITAL | Age: 82
LOS: 4 days | Discharge: HOME | DRG: 392 | End: 2017-04-11
Attending: INTERNAL MEDICINE | Admitting: HOSPITALIST
Payer: MEDICARE

## 2017-04-06 ENCOUNTER — APPOINTMENT (OUTPATIENT)
Dept: INFUSION THERAPY | Facility: HOSPITAL | Age: 82
End: 2017-04-06

## 2017-04-06 ENCOUNTER — OUTPATIENT (OUTPATIENT)
Dept: OUTPATIENT SERVICES | Facility: HOSPITAL | Age: 82
LOS: 1 days | End: 2017-04-06

## 2017-04-06 ENCOUNTER — APPOINTMENT (OUTPATIENT)
Dept: CT IMAGING | Facility: IMAGING CENTER | Age: 82
End: 2017-04-06

## 2017-04-06 VITALS
SYSTOLIC BLOOD PRESSURE: 137 MMHG | RESPIRATION RATE: 18 BRPM | HEART RATE: 70 BPM | OXYGEN SATURATION: 97 % | DIASTOLIC BLOOD PRESSURE: 74 MMHG | TEMPERATURE: 98 F

## 2017-04-06 DIAGNOSIS — K57.20 DIVERTICULITIS OF LARGE INTESTINE WITH PERFORATION AND ABSCESS WITHOUT BLEEDING: ICD-10-CM

## 2017-04-06 DIAGNOSIS — K59.00 CONSTIPATION, UNSPECIFIED: ICD-10-CM

## 2017-04-06 DIAGNOSIS — C92.00 ACUTE MYELOBLASTIC LEUKEMIA, NOT HAVING ACHIEVED REMISSION: ICD-10-CM

## 2017-04-06 DIAGNOSIS — Z41.8 ENCOUNTER FOR OTHER PROCEDURES FOR PURPOSES OTHER THAN REMEDYING HEALTH STATE: ICD-10-CM

## 2017-04-06 DIAGNOSIS — I10 ESSENTIAL (PRIMARY) HYPERTENSION: ICD-10-CM

## 2017-04-06 DIAGNOSIS — E87.8 OTHER DISORDERS OF ELECTROLYTE AND FLUID BALANCE, NOT ELSEWHERE CLASSIFIED: ICD-10-CM

## 2017-04-06 DIAGNOSIS — D61.818 OTHER PANCYTOPENIA: ICD-10-CM

## 2017-04-06 LAB
ALBUMIN SERPL ELPH-MCNC: 3.1 G/DL — LOW (ref 3.3–5)
ALP SERPL-CCNC: 104 U/L — SIGNIFICANT CHANGE UP (ref 40–120)
ALT FLD-CCNC: 11 U/L RC — SIGNIFICANT CHANGE UP (ref 10–45)
ANION GAP SERPL CALC-SCNC: 13 MMOL/L — SIGNIFICANT CHANGE UP (ref 5–17)
APTT BLD: 31 SEC — SIGNIFICANT CHANGE UP (ref 27.5–37.4)
AST SERPL-CCNC: 17 U/L — SIGNIFICANT CHANGE UP (ref 10–40)
BASOPHILS # BLD AUTO: 0 K/UL — SIGNIFICANT CHANGE UP (ref 0–0.2)
BASOPHILS NFR BLD AUTO: 0 % — SIGNIFICANT CHANGE UP (ref 0–2)
BILIRUB SERPL-MCNC: 1 MG/DL — SIGNIFICANT CHANGE UP (ref 0.2–1.2)
BLD GP AB SCN SERPL QL: NEGATIVE — SIGNIFICANT CHANGE UP
BUN SERPL-MCNC: 21 MG/DL — SIGNIFICANT CHANGE UP (ref 7–23)
CALCIUM SERPL-MCNC: 9 MG/DL — SIGNIFICANT CHANGE UP (ref 8.4–10.5)
CHLORIDE SERPL-SCNC: 97 MMOL/L — SIGNIFICANT CHANGE UP (ref 96–108)
CO2 SERPL-SCNC: 29 MMOL/L — SIGNIFICANT CHANGE UP (ref 22–31)
CREAT SERPL-MCNC: 0.86 MG/DL — SIGNIFICANT CHANGE UP (ref 0.5–1.3)
EOSINOPHIL # BLD AUTO: 0 K/UL — SIGNIFICANT CHANGE UP (ref 0–0.5)
EOSINOPHIL # BLD AUTO: 0.1 K/UL — SIGNIFICANT CHANGE UP (ref 0–0.5)
EOSINOPHIL NFR BLD AUTO: 1.4 % — SIGNIFICANT CHANGE UP (ref 0–6)
EOSINOPHIL NFR BLD AUTO: 2 % — SIGNIFICANT CHANGE UP (ref 0–6)
GAS PNL BLDV: SIGNIFICANT CHANGE UP
GLUCOSE SERPL-MCNC: 135 MG/DL — HIGH (ref 70–99)
HCT VFR BLD CALC: 23.5 % — LOW (ref 39–50)
HCT VFR BLD CALC: 26.2 % — LOW (ref 39–50)
HGB BLD-MCNC: 8.2 G/DL — LOW (ref 13–17)
HGB BLD-MCNC: 9.1 G/DL — LOW (ref 13–17)
INR BLD: 1.59 RATIO — HIGH (ref 0.88–1.16)
LIDOCAIN IGE QN: 15 U/L — SIGNIFICANT CHANGE UP (ref 7–60)
LYMPHOCYTES # BLD AUTO: 0.3 K/UL — LOW (ref 1–3.3)
LYMPHOCYTES # BLD AUTO: 0.7 K/UL — LOW (ref 1–3.3)
LYMPHOCYTES # BLD AUTO: 45.6 % — HIGH (ref 13–44)
LYMPHOCYTES # BLD AUTO: 69 % — HIGH (ref 13–44)
MAGNESIUM SERPL-MCNC: 1.5 MG/DL — LOW (ref 1.6–2.6)
MCHC RBC-ENTMCNC: 29.5 PG — SIGNIFICANT CHANGE UP (ref 27–34)
MCHC RBC-ENTMCNC: 30 PG — SIGNIFICANT CHANGE UP (ref 27–34)
MCHC RBC-ENTMCNC: 34.6 G/DL — SIGNIFICANT CHANGE UP (ref 32–36)
MCHC RBC-ENTMCNC: 35 GM/DL — SIGNIFICANT CHANGE UP (ref 32–36)
MCV RBC AUTO: 85.2 FL — SIGNIFICANT CHANGE UP (ref 80–100)
MCV RBC AUTO: 85.6 FL — SIGNIFICANT CHANGE UP (ref 80–100)
MONOCYTES # BLD AUTO: 0 K/UL — SIGNIFICANT CHANGE UP (ref 0–0.9)
MONOCYTES # BLD AUTO: 0 K/UL — SIGNIFICANT CHANGE UP (ref 0–0.9)
MONOCYTES NFR BLD AUTO: 2.5 % — SIGNIFICANT CHANGE UP (ref 2–14)
MONOCYTES NFR BLD AUTO: 6 % — SIGNIFICANT CHANGE UP (ref 2–14)
NEUTROPHILS # BLD AUTO: 0.3 K/UL — LOW (ref 1.8–7.4)
NEUTROPHILS # BLD AUTO: 0.4 K/UL — LOW (ref 1.8–7.4)
NEUTROPHILS NFR BLD AUTO: 23 % — LOW (ref 43–77)
NEUTROPHILS NFR BLD AUTO: 50.5 % — SIGNIFICANT CHANGE UP (ref 43–77)
PLAT MORPH BLD: NORMAL — SIGNIFICANT CHANGE UP
PLATELET # BLD AUTO: 10 K/UL — CRITICAL LOW (ref 150–400)
PLATELET # BLD AUTO: 4 K/UL — CRITICAL LOW (ref 150–400)
PLATELET # BLD MANUAL: 8 K/UL — CRITICAL LOW (ref 150–400)
POTASSIUM SERPL-MCNC: 3.1 MMOL/L — LOW (ref 3.5–5.3)
POTASSIUM SERPL-SCNC: 3.1 MMOL/L — LOW (ref 3.5–5.3)
PROT SERPL-MCNC: 6.3 G/DL — SIGNIFICANT CHANGE UP (ref 6–8.3)
PROTHROM AB SERPL-ACNC: 17.5 SEC — HIGH (ref 9.8–12.7)
RBC # BLD: 2.74 M/UL — LOW (ref 4.2–5.8)
RBC # BLD: 3.08 M/UL — LOW (ref 4.2–5.8)
RBC # FLD: 12.7 % — SIGNIFICANT CHANGE UP (ref 10.3–14.5)
RBC # FLD: 12.8 % — SIGNIFICANT CHANGE UP (ref 10.3–14.5)
RBC BLD AUTO: SIGNIFICANT CHANGE UP
RH IG SCN BLD-IMP: POSITIVE — SIGNIFICANT CHANGE UP
SODIUM SERPL-SCNC: 139 MMOL/L — SIGNIFICANT CHANGE UP (ref 135–145)
WBC # BLD: 0.6 K/UL — CRITICAL LOW (ref 3.8–10.5)
WBC # BLD: 1.3 K/UL — LOW (ref 3.8–10.5)
WBC # FLD AUTO: 0.6 K/UL — CRITICAL LOW (ref 3.8–10.5)
WBC # FLD AUTO: 1.3 K/UL — LOW (ref 3.8–10.5)

## 2017-04-06 PROCEDURE — 93010 ELECTROCARDIOGRAM REPORT: CPT

## 2017-04-06 PROCEDURE — 99285 EMERGENCY DEPT VISIT HI MDM: CPT | Mod: 25,GC

## 2017-04-06 RX ORDER — POTASSIUM CHLORIDE 20 MEQ
40 PACKET (EA) ORAL ONCE
Qty: 0 | Refills: 0 | Status: COMPLETED | OUTPATIENT
Start: 2017-04-06 | End: 2017-04-06

## 2017-04-06 RX ORDER — CIPROFLOXACIN LACTATE 400MG/40ML
400 VIAL (ML) INTRAVENOUS ONCE
Qty: 0 | Refills: 0 | Status: COMPLETED | OUTPATIENT
Start: 2017-04-06 | End: 2017-04-06

## 2017-04-06 RX ORDER — MAGNESIUM SULFATE 500 MG/ML
2 VIAL (ML) INJECTION ONCE
Qty: 0 | Refills: 0 | Status: COMPLETED | OUTPATIENT
Start: 2017-04-06 | End: 2017-04-06

## 2017-04-06 RX ORDER — PIPERACILLIN AND TAZOBACTAM 4; .5 G/20ML; G/20ML
3.38 INJECTION, POWDER, LYOPHILIZED, FOR SOLUTION INTRAVENOUS EVERY 8 HOURS
Qty: 0 | Refills: 0 | Status: DISCONTINUED | OUTPATIENT
Start: 2017-04-06 | End: 2017-04-11

## 2017-04-06 RX ORDER — OXYCODONE HYDROCHLORIDE 5 MG/1
1 TABLET ORAL
Qty: 20 | Refills: 0 | COMMUNITY

## 2017-04-06 RX ORDER — ELTROMBOPAG OLAMINE 50 MG/1
1 TABLET, FILM COATED ORAL
Qty: 0 | Refills: 0 | COMMUNITY

## 2017-04-06 RX ORDER — SODIUM CHLORIDE 9 MG/ML
1000 INJECTION, SOLUTION INTRAVENOUS
Qty: 0 | Refills: 0 | Status: DISCONTINUED | OUTPATIENT
Start: 2017-04-06 | End: 2017-04-06

## 2017-04-06 RX ORDER — METRONIDAZOLE 500 MG
500 TABLET ORAL ONCE
Qty: 0 | Refills: 0 | Status: COMPLETED | OUTPATIENT
Start: 2017-04-06 | End: 2017-04-06

## 2017-04-06 RX ORDER — DOCUSATE SODIUM 100 MG
2 CAPSULE ORAL
Qty: 0 | Refills: 0 | COMMUNITY

## 2017-04-06 RX ORDER — HYDROCORTISONE 20 MG
100 TABLET ORAL ONCE
Qty: 0 | Refills: 0 | Status: COMPLETED | OUTPATIENT
Start: 2017-04-06 | End: 2017-04-06

## 2017-04-06 RX ORDER — PIPERACILLIN AND TAZOBACTAM 4; .5 G/20ML; G/20ML
3.38 INJECTION, POWDER, LYOPHILIZED, FOR SOLUTION INTRAVENOUS ONCE
Qty: 0 | Refills: 0 | Status: COMPLETED | OUTPATIENT
Start: 2017-04-06 | End: 2017-04-07

## 2017-04-06 RX ORDER — LOSARTAN POTASSIUM 100 MG/1
100 TABLET, FILM COATED ORAL DAILY
Qty: 0 | Refills: 0 | Status: DISCONTINUED | OUTPATIENT
Start: 2017-04-06 | End: 2017-04-11

## 2017-04-06 RX ORDER — FLUCONAZOLE 150 MG/1
200 TABLET ORAL DAILY
Qty: 0 | Refills: 0 | Status: DISCONTINUED | OUTPATIENT
Start: 2017-04-06 | End: 2017-04-11

## 2017-04-06 RX ORDER — SODIUM CHLORIDE 9 MG/ML
1000 INJECTION INTRAMUSCULAR; INTRAVENOUS; SUBCUTANEOUS ONCE
Qty: 0 | Refills: 0 | Status: COMPLETED | OUTPATIENT
Start: 2017-04-06 | End: 2017-04-06

## 2017-04-06 RX ORDER — ACETAMINOPHEN 500 MG
1000 TABLET ORAL ONCE
Qty: 0 | Refills: 0 | Status: COMPLETED | OUTPATIENT
Start: 2017-04-06 | End: 2017-04-06

## 2017-04-06 RX ORDER — METRONIDAZOLE 500 MG
500 TABLET ORAL EVERY 8 HOURS
Qty: 0 | Refills: 0 | Status: DISCONTINUED | OUTPATIENT
Start: 2017-04-07 | End: 2017-04-11

## 2017-04-06 RX ORDER — SODIUM CHLORIDE 9 MG/ML
1000 INJECTION INTRAMUSCULAR; INTRAVENOUS; SUBCUTANEOUS
Qty: 0 | Refills: 0 | Status: DISCONTINUED | OUTPATIENT
Start: 2017-04-06 | End: 2017-04-07

## 2017-04-06 RX ADMIN — Medication 100 MILLIGRAM(S): at 20:13

## 2017-04-06 RX ADMIN — SODIUM CHLORIDE 125 MILLILITER(S): 9 INJECTION INTRAMUSCULAR; INTRAVENOUS; SUBCUTANEOUS at 23:34

## 2017-04-06 RX ADMIN — Medication 100 MILLIGRAM(S): at 23:42

## 2017-04-06 RX ADMIN — Medication 400 MILLIGRAM(S): at 20:14

## 2017-04-06 RX ADMIN — SODIUM CHLORIDE 1000 MILLILITER(S): 9 INJECTION INTRAMUSCULAR; INTRAVENOUS; SUBCUTANEOUS at 19:27

## 2017-04-06 RX ADMIN — Medication 200 MILLIGRAM(S): at 19:26

## 2017-04-06 RX ADMIN — Medication 50 GRAM(S): at 21:46

## 2017-04-06 RX ADMIN — Medication 1000 MILLIGRAM(S): at 20:58

## 2017-04-06 RX ADMIN — Medication 40 MILLIEQUIVALENT(S): at 21:38

## 2017-04-06 NOTE — H&P ADULT. - PROBLEM SELECTOR PLAN 2
Patient with pancytopenia, and family reports flunctuating Hg and platelets  -At this time, no need for transfusion for pRBCs. However, patient with platelet of 4, will order for HLA matched platelets (per heme, transfuse for plts less than 10). F/u post-tranfusion platelet count. Will have to hold Promacta, as medication not available in pharmacy  -continue to monitor for any signs of bleeding.  -F/u official hematology recommendations  -continue with fluconazole for ppx Patient with pancytopenia, and family reports fluctuating Hg and platelets  -At this time, no need for transfusion for pRBCs. However, patient with platelet of 4, will order for HLA matched platelets (per heme, transfuse for plts less than 10). F/u post-transfusion platelet count. Will have to hold Promacta, as medication not available in pharmacy  -continue to monitor for any signs of bleeding.  -F/u official hematology recommendations  -continue with fluconazole for ppx

## 2017-04-06 NOTE — H&P ADULT. - LAB RESULTS AND INTERPRETATION
Personally reviewed and interpreted:  CBC: Patient with pancytopenia with ANC of 300, Platelets of 4  CMP: Patient with hypokalemia and hypomagnesemia, Phosphorus level added on (pending)  VBG: Lactate level of 1.8

## 2017-04-06 NOTE — H&P ADULT. - HISTORY OF PRESENT ILLNESS
84 yo M with history of AML, HTN, BPH, presents for abdominal pain. Patient and family is at bedside providing history. Patient had been having lower abdominal pain for weeks, which had been worsening. Patient had went to Dr. Linares's office, and she sent him to CT scan, which had showed diverticulitis. Patient was told to come to the hospital for further mangement. Patient reports associated nausea, decreased PO itnake. He has not been able to eat as he feels like when he does, it is stuck there and is sticking out. He feels like gas has not been coming out. Patient reports he has not had a BM since about Monday, as he feels like he has not been eating enough. No fevers, chills, diarrhea, new rash, vomiting, chest pain, or SOB.     Patient had been treated for AML, last chemo was end of February. However, family had decided to no longer have chemotherapy, but patient follows heme/onc as he is transfusion dependent. He had recently started Promacta for thrombocytopenia.  Patient at baseline, lives at home, mostly dependent on IADLs and ADLs for assistance. Able to somewhat go around house with walker, but still loses balance at times.    In the ED: patient's VS: BP: 137/74 HR of 70, T of 98.2, RR of 18 O2 saturation of 97%. Patient had received NS 1L bolus, metronidazole, ciprofloxacin, and tylenol.

## 2017-04-06 NOTE — H&P ADULT. - RADIOLOGY RESULTS AND INTERPRETATION
Personally reviewed and interpreted:  CXR pending  CT A/P: Findings suggestive of diverticulitis with intramural abscess

## 2017-04-06 NOTE — H&P ADULT. - RS GEN PE MLT RESP DETAILS PC
respirations non-labored/breath sounds equal/good air movement/airway patent/clear to auscultation bilaterally

## 2017-04-06 NOTE — ED PROVIDER NOTE - OBJECTIVE STATEMENT
83M hx AML sp chemo last in February, travis Sarmiento at [facility], sent in from [facility] with dx of diverticulitis on CTap done today.  Pt presented there with decreased appetite, nausea, constipation.  No vomiting.  No diarrhea.  No fever. Took tylenol at 12noon today.

## 2017-04-06 NOTE — H&P ADULT. - PROBLEM SELECTOR PLAN 5
Patient with a hx of HTN  -Will continue with losartan with high hold parameters and holding HCTZ due to hypovolemia state

## 2017-04-06 NOTE — H&P ADULT. - ATTENDING COMMENTS
NIGHT HOSPITALIST:  Patient UNKNOWN to me previously, assigned to me via the ER to admit this 84 y/o M--followed by Dr. Linares at [facility] with a history of AML, HTN< reported BPH with lower abdominal discomfort for several weeks, with the patient last chemotherapy treatment in 2/17.  No fever, no chills, no rigors.  No chest pain/pressure.  No dyspnea.  No red blood per rectum or melena.  No back pain, no tearing back pain.  Patient with involuntary weight loss and anorexia for 2 weeks.  Remaining review of systems not contributory.  Physical exam with an elderly, cachectic, chronically ill appearing M.  Hemodynamics stable.  Afebrile.  HEENT, PERRL, EOMI, neck supple, chest clear, cor s1 s2, abdomen scaphoid, soft, normal bowel sounds, non-tender, no rebound.  Ext with diffuse sarcopenia.  Neurologic exam AxOx3.  speech fluent. cognition intact.  UE/LE 5/5. NIGHT HOSPITALIST:  Patient UNKNOWN to me previously, assigned to me via the ER to admit this 84 y/o M--followed by Dr. Linares at [facility] with a history of AML, HTN< reported BPH with lower abdominal discomfort for several weeks, with the patient last chemotherapy treatment in 2/17.  No fever, no chills, no rigors.  No chest pain/pressure.  No dyspnea.  No red blood per rectum or melena.  No back pain, no tearing back pain.  Patient with involuntary weight loss and anorexia for 2 weeks.  Remaining review of systems not contributory.  Physical exam with an elderly, cachectic, chronically ill appearing M.  Hemodynamics stable.  Afebrile.  HEENT, PERRL, EOMI, neck supple, chest clear, cor s1 s2, abdomen scaphoid, soft, normal bowel sounds, non-tender, no rebound.  Ext with diffuse sarcopenia.  Neurologic exam AxOx3.  speech fluent. cognition intact.  UE/LE 5/5.  EKG tracing reviewed with NSR at 61.  WBC 0.6   Hgb 8.2.  Platelets of 4K>>transfused platelets.  Cr 0.8.  Alb 3.1  CTT with mid descending colon with diverticulitis and base cecum thickening.  Seen by surgery as above.  Admitted to medicine.  Would continue with broad spectrum IV antibiotics for anti-Pseudomonal coverage.  Would have hematology/oncology review CTT>>unclear if family wishes to pursue the abnormalities on the CTT as patient is presently treated for diverticulitis with suspected abscess and presently precludes endoscopic assessment.  Unclear to the goals of therapy>.patient/family is not yet ready to discuss advance directives.  Prognosis is guarded and patient's long term prognosis is poor.  Emotional support provided to patient.  Patient /family aware of course and agree with plan/care as above.  Care assumed by the DAY HOSPITALIST. NIGHT HOSPITALIST:  Patient UNKNOWN to me previously, assigned to me via the ER to admit this 82 y/o M--followed by Dr. Linares at [facility] with a history of AML, HTN< reported BPH with lower abdominal discomfort for several weeks, with the patient last chemotherapy treatment in 2/17.  No fever, no chills, no rigors.  No chest pain/pressure.  No dyspnea.  No red blood per rectum or melena.  No back pain, no tearing back pain.  Patient with involuntary weight loss and anorexia for 2 weeks.  Remaining review of systems not contributory.  Physical exam with an elderly, cachectic, chronically ill appearing M.  Hemodynamics stable.  Afebrile.  HEENT, PERRL, EOMI, neck supple, chest clear, cor s1 s2, abdomen scaphoid, soft, normal bowel sounds, non-tender, no rebound.  Ext with diffuse sarcopenia.  Neurologic exam AxOx3.  speech fluent. cognition intact.  UE/LE 5/5.  EKG tracing reviewed with NSR at 61.  WBC 0.6   Hgb 8.2.  Platelets of 4K>>transfused platelets.  Cr 0.8.  Alb 3.1  CTT with mid descending colon with diverticulitis and base cecum thickening.  Seen by surgery as above.  Admitted to medicine.  Would continue with broad spectrum IV antibiotics for anti-Pseudomonal coverage.  Would have hematology/oncology review CTT>>unclear if family wishes to pursue the abnormalities on the CTT as patient is presently treated for diverticulitis with suspected abscess and presently precludes endoscopic assessment.  Check platelets past transfusion.  Unclear to the goals of therapy>.patient/family is not yet ready to discuss advance directives.  Prognosis is guarded and patient's long term prognosis is poor.  Emotional support provided to patient.  Patient /family aware of course and agree with plan/care as above.  Care assumed by the DAY HOSPITALIST.

## 2017-04-06 NOTE — H&P ADULT. - PROBLEM SELECTOR PLAN 4
Patient of Dr. Linares's, not currently on chemotherapy  -continue to monitor CBC closely and transfuse as above  -f/u official hematology recommendation

## 2017-04-06 NOTE — ED PROVIDER NOTE - ATTENDING CONTRIBUTION TO CARE
82yo M hx of leukemia last chemo end of February here w diverticulitis diagnosed on CT AP earlier today. Pt follows with Dr Chang at [facility]. States for the past week or so he has been having intermittent abd discomfort, nausea, dec appetite, constipation. No fever or vomiting.   Gen: Chronically ill appearing NAD  HEENT: NCAT PERRL EOMI normal pharynx  Neck: supple  CV: RRR, no murmur  Lung: CTA BL  Abd: +BS soft NTND  Ext: wwp, palp pulses, FROMx4, no cce  Neuro: A&Ox3, CN grossly intact, sensation intact, motor 5/5 throughout  Plan: Labs, cultures in setting of pancytopenia, abx, consult to surgery for intramural abscess, consult to heme/onc and admit.

## 2017-04-06 NOTE — H&P ADULT. - ASSESSMENT
84 yo M with history of AML, HTN, BPH, presents for abdominal pain, found to have diverticulitis with abscess with pancytopenia.

## 2017-04-06 NOTE — H&P ADULT. - PROBLEM SELECTOR PLAN 3
Patient with hypokalemia, and hypomagnesemia, likely 2/2 decreased PO intake  -Electrolytes supplemented, awaiting phosphorus level  -F/u AM BMP and replete as needed

## 2017-04-06 NOTE — H&P ADULT. - PROBLEM SELECTOR PLAN 1
Patient with diverticulitis with abscess present  -IV abx: metronidazole and ciprofloxacin  -aggressive IVF as patient hypovolemic and with no adequate PO intake. Will try clear liquids and if patient can tolerate, advance diet  -Surgery consulted for abscess, although likely no surgical intervention, as family said they would want no surgery at this time. Discussed with surgery team, who said will likely need only IV abx, but will f/u official recommendation  -continue to f/u blood cultures  -continue close monitoring with VS q4h Patient with diverticulitis with abscess present  -IV abx: metronidazole and zosyn  -aggressive IVF as patient hypovolemic and with no adequate PO intake. Will try clear liquids and if patient can tolerate, advance diet  -Surgery consulted for abscess, although likely no surgical intervention, as family said they would want no surgery at this time. Discussed with surgery team, who said will likely need only IV abx, but will f/u official recommendation  -continue to f/u blood cultures  -continue close monitoring with VS q4h

## 2017-04-06 NOTE — PATIENT PROFILE ADULT. - MEDICATION ADMINISTRATION INFO, PROFILE
no concerns Trilobed Flap Text: The defect edges were debeveled with a #15 scalpel blade.  Given the location of the defect and the proximity to free margins a trilobed flap was deemed most appropriate.  Using a sterile surgical marker, an appropriate trilobed flap drawn around the defect.    The area thus outlined was incised deep to adipose tissue with a #15 scalpel blade.  The skin margins were undermined to an appropriate distance in all directions utilizing iris scissors.

## 2017-04-07 LAB
ANION GAP SERPL CALC-SCNC: 14 MMOL/L — SIGNIFICANT CHANGE UP (ref 5–17)
BUN SERPL-MCNC: 19 MG/DL — SIGNIFICANT CHANGE UP (ref 7–23)
CALCIUM SERPL-MCNC: 8.8 MG/DL — SIGNIFICANT CHANGE UP (ref 8.4–10.5)
CHLORIDE SERPL-SCNC: 101 MMOL/L — SIGNIFICANT CHANGE UP (ref 96–108)
CO2 SERPL-SCNC: 26 MMOL/L — SIGNIFICANT CHANGE UP (ref 22–31)
CREAT SERPL-MCNC: 0.71 MG/DL — SIGNIFICANT CHANGE UP (ref 0.5–1.3)
GLUCOSE SERPL-MCNC: 130 MG/DL — HIGH (ref 70–99)
HCT VFR BLD CALC: 21.8 % — LOW (ref 39–50)
HGB BLD-MCNC: 7.8 G/DL — LOW (ref 13–17)
MAGNESIUM SERPL-MCNC: 1.8 MG/DL — SIGNIFICANT CHANGE UP (ref 1.6–2.6)
MCHC RBC-ENTMCNC: 30.8 PG — SIGNIFICANT CHANGE UP (ref 27–34)
MCHC RBC-ENTMCNC: 36 GM/DL — SIGNIFICANT CHANGE UP (ref 32–36)
MCV RBC AUTO: 85.5 FL — SIGNIFICANT CHANGE UP (ref 80–100)
PHOSPHATE SERPL-MCNC: 3.1 MG/DL — SIGNIFICANT CHANGE UP (ref 2.5–4.5)
PLATELET # BLD AUTO: 5 K/UL — CRITICAL LOW (ref 150–400)
POTASSIUM SERPL-MCNC: 2.6 MMOL/L — CRITICAL LOW (ref 3.5–5.3)
POTASSIUM SERPL-SCNC: 2.6 MMOL/L — CRITICAL LOW (ref 3.5–5.3)
RBC # BLD: 2.55 M/UL — LOW (ref 4.2–5.8)
RBC # FLD: 12.8 % — SIGNIFICANT CHANGE UP (ref 10.3–14.5)
SODIUM SERPL-SCNC: 141 MMOL/L — SIGNIFICANT CHANGE UP (ref 135–145)
WBC # BLD: 0.6 K/UL — CRITICAL LOW (ref 3.8–10.5)
WBC # FLD AUTO: 0.6 K/UL — CRITICAL LOW (ref 3.8–10.5)

## 2017-04-07 PROCEDURE — 99231 SBSQ HOSP IP/OBS SF/LOW 25: CPT

## 2017-04-07 PROCEDURE — 99221 1ST HOSP IP/OBS SF/LOW 40: CPT

## 2017-04-07 PROCEDURE — 99223 1ST HOSP IP/OBS HIGH 75: CPT

## 2017-04-07 PROCEDURE — 71010: CPT | Mod: 26

## 2017-04-07 PROCEDURE — 99223 1ST HOSP IP/OBS HIGH 75: CPT | Mod: GC

## 2017-04-07 RX ORDER — POTASSIUM CHLORIDE 20 MEQ
40 PACKET (EA) ORAL EVERY 4 HOURS
Qty: 0 | Refills: 0 | Status: COMPLETED | OUTPATIENT
Start: 2017-04-07 | End: 2017-04-07

## 2017-04-07 RX ADMIN — PIPERACILLIN AND TAZOBACTAM 25 GRAM(S): 4; .5 INJECTION, POWDER, LYOPHILIZED, FOR SOLUTION INTRAVENOUS at 13:30

## 2017-04-07 RX ADMIN — Medication 40 MILLIEQUIVALENT(S): at 10:14

## 2017-04-07 RX ADMIN — LOSARTAN POTASSIUM 100 MILLIGRAM(S): 100 TABLET, FILM COATED ORAL at 05:01

## 2017-04-07 RX ADMIN — PIPERACILLIN AND TAZOBACTAM 200 GRAM(S): 4; .5 INJECTION, POWDER, LYOPHILIZED, FOR SOLUTION INTRAVENOUS at 05:59

## 2017-04-07 RX ADMIN — Medication 100 MILLIGRAM(S): at 18:44

## 2017-04-07 RX ADMIN — Medication 100 MILLIGRAM(S): at 04:37

## 2017-04-07 RX ADMIN — Medication 100 MILLIGRAM(S): at 10:15

## 2017-04-07 RX ADMIN — Medication 40 MILLIEQUIVALENT(S): at 13:30

## 2017-04-07 RX ADMIN — FLUCONAZOLE 200 MILLIGRAM(S): 150 TABLET ORAL at 11:29

## 2017-04-07 RX ADMIN — PIPERACILLIN AND TAZOBACTAM 25 GRAM(S): 4; .5 INJECTION, POWDER, LYOPHILIZED, FOR SOLUTION INTRAVENOUS at 21:49

## 2017-04-08 LAB
ALBUMIN SERPL ELPH-MCNC: 2.6 G/DL — LOW (ref 3.3–5)
ALP SERPL-CCNC: 81 U/L — SIGNIFICANT CHANGE UP (ref 40–120)
ALT FLD-CCNC: 12 U/L — SIGNIFICANT CHANGE UP (ref 10–45)
ANION GAP SERPL CALC-SCNC: 13 MMOL/L — SIGNIFICANT CHANGE UP (ref 5–17)
ANION GAP SERPL CALC-SCNC: 14 MMOL/L — SIGNIFICANT CHANGE UP (ref 5–17)
AST SERPL-CCNC: 16 U/L — SIGNIFICANT CHANGE UP (ref 10–40)
BASOPHILS # BLD AUTO: 0.01 K/UL — SIGNIFICANT CHANGE UP (ref 0–0.2)
BASOPHILS NFR BLD AUTO: 1.1 % — SIGNIFICANT CHANGE UP (ref 0–2)
BILIRUB SERPL-MCNC: 0.6 MG/DL — SIGNIFICANT CHANGE UP (ref 0.2–1.2)
BUN SERPL-MCNC: 13 MG/DL — SIGNIFICANT CHANGE UP (ref 7–23)
BUN SERPL-MCNC: 14 MG/DL — SIGNIFICANT CHANGE UP (ref 7–23)
CALCIUM SERPL-MCNC: 8.5 MG/DL — SIGNIFICANT CHANGE UP (ref 8.4–10.5)
CALCIUM SERPL-MCNC: 9 MG/DL — SIGNIFICANT CHANGE UP (ref 8.4–10.5)
CHLORIDE SERPL-SCNC: 102 MMOL/L — SIGNIFICANT CHANGE UP (ref 96–108)
CHLORIDE SERPL-SCNC: 105 MMOL/L — SIGNIFICANT CHANGE UP (ref 96–108)
CO2 SERPL-SCNC: 25 MMOL/L — SIGNIFICANT CHANGE UP (ref 22–31)
CO2 SERPL-SCNC: 25 MMOL/L — SIGNIFICANT CHANGE UP (ref 22–31)
CREAT SERPL-MCNC: 0.65 MG/DL — SIGNIFICANT CHANGE UP (ref 0.5–1.3)
CREAT SERPL-MCNC: 0.74 MG/DL — SIGNIFICANT CHANGE UP (ref 0.5–1.3)
EOSINOPHIL # BLD AUTO: 0 K/UL — SIGNIFICANT CHANGE UP (ref 0–0.5)
EOSINOPHIL NFR BLD AUTO: 0 % — SIGNIFICANT CHANGE UP (ref 0–6)
GLUCOSE SERPL-MCNC: 106 MG/DL — HIGH (ref 70–99)
GLUCOSE SERPL-MCNC: 207 MG/DL — HIGH (ref 70–99)
HCT VFR BLD CALC: 21.2 % — LOW (ref 39–50)
HGB BLD-MCNC: 7.2 G/DL — LOW (ref 13–17)
IMM GRANULOCYTES NFR BLD AUTO: 6.9 % — HIGH (ref 0–1.5)
LYMPHOCYTES # BLD AUTO: 0.49 K/UL — LOW (ref 1–3.3)
LYMPHOCYTES # BLD AUTO: 56.3 % — HIGH (ref 13–44)
MAGNESIUM SERPL-MCNC: 1.6 MG/DL — SIGNIFICANT CHANGE UP (ref 1.6–2.6)
MCHC RBC-ENTMCNC: 28.3 PG — SIGNIFICANT CHANGE UP (ref 27–34)
MCHC RBC-ENTMCNC: 34 GM/DL — SIGNIFICANT CHANGE UP (ref 32–36)
MCV RBC AUTO: 83.5 FL — SIGNIFICANT CHANGE UP (ref 80–100)
MONOCYTES # BLD AUTO: 0.02 K/UL — SIGNIFICANT CHANGE UP (ref 0–0.9)
MONOCYTES NFR BLD AUTO: 2.3 % — SIGNIFICANT CHANGE UP (ref 2–14)
NEUTROPHILS # BLD AUTO: 0.29 K/UL — LOW (ref 1.8–7.4)
NEUTROPHILS NFR BLD AUTO: 33.4 % — LOW (ref 43–77)
PLATELET # BLD AUTO: 4 K/UL — CRITICAL LOW (ref 150–400)
POTASSIUM SERPL-MCNC: 2.9 MMOL/L — CRITICAL LOW (ref 3.5–5.3)
POTASSIUM SERPL-MCNC: 3.5 MMOL/L — SIGNIFICANT CHANGE UP (ref 3.5–5.3)
POTASSIUM SERPL-SCNC: 2.9 MMOL/L — CRITICAL LOW (ref 3.5–5.3)
POTASSIUM SERPL-SCNC: 3.5 MMOL/L — SIGNIFICANT CHANGE UP (ref 3.5–5.3)
PROT SERPL-MCNC: 5.4 G/DL — LOW (ref 6–8.3)
RBC # BLD: 2.54 M/UL — LOW (ref 4.2–5.8)
RBC # FLD: 14.1 % — SIGNIFICANT CHANGE UP (ref 10.3–14.5)
SODIUM SERPL-SCNC: 140 MMOL/L — SIGNIFICANT CHANGE UP (ref 135–145)
SODIUM SERPL-SCNC: 144 MMOL/L — SIGNIFICANT CHANGE UP (ref 135–145)
WBC # BLD: 0.87 K/UL — CRITICAL LOW (ref 3.8–10.5)
WBC # FLD AUTO: 0.87 K/UL — CRITICAL LOW (ref 3.8–10.5)

## 2017-04-08 PROCEDURE — 99232 SBSQ HOSP IP/OBS MODERATE 35: CPT

## 2017-04-08 RX ORDER — HYDROCORTISONE 20 MG
100 TABLET ORAL ONCE
Qty: 0 | Refills: 0 | Status: COMPLETED | OUTPATIENT
Start: 2017-04-08 | End: 2017-04-08

## 2017-04-08 RX ORDER — ACETAMINOPHEN 500 MG
650 TABLET ORAL ONCE
Qty: 0 | Refills: 0 | Status: COMPLETED | OUTPATIENT
Start: 2017-04-08 | End: 2017-04-08

## 2017-04-08 RX ORDER — POTASSIUM CHLORIDE 20 MEQ
20 PACKET (EA) ORAL ONCE
Qty: 0 | Refills: 0 | Status: COMPLETED | OUTPATIENT
Start: 2017-04-08 | End: 2017-04-08

## 2017-04-08 RX ORDER — POTASSIUM CHLORIDE 20 MEQ
40 PACKET (EA) ORAL ONCE
Qty: 0 | Refills: 0 | Status: COMPLETED | OUTPATIENT
Start: 2017-04-08 | End: 2017-04-08

## 2017-04-08 RX ADMIN — Medication 50 MILLIEQUIVALENT(S): at 10:58

## 2017-04-08 RX ADMIN — PIPERACILLIN AND TAZOBACTAM 25 GRAM(S): 4; .5 INJECTION, POWDER, LYOPHILIZED, FOR SOLUTION INTRAVENOUS at 21:09

## 2017-04-08 RX ADMIN — Medication 100 MILLIGRAM(S): at 10:58

## 2017-04-08 RX ADMIN — FLUCONAZOLE 200 MILLIGRAM(S): 150 TABLET ORAL at 14:04

## 2017-04-08 RX ADMIN — Medication 650 MILLIGRAM(S): at 14:04

## 2017-04-08 RX ADMIN — Medication 100 MILLIGRAM(S): at 14:04

## 2017-04-08 RX ADMIN — PIPERACILLIN AND TAZOBACTAM 25 GRAM(S): 4; .5 INJECTION, POWDER, LYOPHILIZED, FOR SOLUTION INTRAVENOUS at 14:12

## 2017-04-08 RX ADMIN — Medication 100 MILLIGRAM(S): at 18:50

## 2017-04-08 RX ADMIN — Medication 100 MILLIGRAM(S): at 03:10

## 2017-04-08 RX ADMIN — PIPERACILLIN AND TAZOBACTAM 25 GRAM(S): 4; .5 INJECTION, POWDER, LYOPHILIZED, FOR SOLUTION INTRAVENOUS at 05:16

## 2017-04-08 RX ADMIN — Medication 40 MILLIEQUIVALENT(S): at 10:57

## 2017-04-08 RX ADMIN — LOSARTAN POTASSIUM 100 MILLIGRAM(S): 100 TABLET, FILM COATED ORAL at 05:15

## 2017-04-09 LAB
ANION GAP SERPL CALC-SCNC: 14 MMOL/L — SIGNIFICANT CHANGE UP (ref 5–17)
BASOPHILS # BLD AUTO: 0 K/UL — SIGNIFICANT CHANGE UP (ref 0–0.2)
BASOPHILS NFR BLD AUTO: 0 % — SIGNIFICANT CHANGE UP (ref 0–2)
BUN SERPL-MCNC: 13 MG/DL — SIGNIFICANT CHANGE UP (ref 7–23)
CALCIUM SERPL-MCNC: 8.7 MG/DL — SIGNIFICANT CHANGE UP (ref 8.4–10.5)
CHLORIDE SERPL-SCNC: 105 MMOL/L — SIGNIFICANT CHANGE UP (ref 96–108)
CO2 SERPL-SCNC: 25 MMOL/L — SIGNIFICANT CHANGE UP (ref 22–31)
CREAT SERPL-MCNC: 0.65 MG/DL — SIGNIFICANT CHANGE UP (ref 0.5–1.3)
EOSINOPHIL # BLD AUTO: 0 K/UL — SIGNIFICANT CHANGE UP (ref 0–0.5)
EOSINOPHIL NFR BLD AUTO: 0 % — SIGNIFICANT CHANGE UP (ref 0–6)
GLUCOSE SERPL-MCNC: 89 MG/DL — SIGNIFICANT CHANGE UP (ref 70–99)
HCT VFR BLD CALC: 19.5 % — CRITICAL LOW (ref 39–50)
HGB BLD-MCNC: 6.9 G/DL — CRITICAL LOW (ref 13–17)
LYMPHOCYTES # BLD AUTO: 0.46 K/UL — LOW (ref 1–3.3)
LYMPHOCYTES # BLD AUTO: 54 % — HIGH (ref 13–44)
MCHC RBC-ENTMCNC: 29.4 PG — SIGNIFICANT CHANGE UP (ref 27–34)
MCHC RBC-ENTMCNC: 35.4 GM/DL — SIGNIFICANT CHANGE UP (ref 32–36)
MCV RBC AUTO: 83 FL — SIGNIFICANT CHANGE UP (ref 80–100)
MONOCYTES # BLD AUTO: 0.03 K/UL — SIGNIFICANT CHANGE UP (ref 0–0.9)
MONOCYTES NFR BLD AUTO: 4 % — SIGNIFICANT CHANGE UP (ref 2–14)
NEUTROPHILS # BLD AUTO: 0.34 K/UL — LOW (ref 1.8–7.4)
NEUTROPHILS NFR BLD AUTO: 40 % — LOW (ref 43–77)
PLATELET # BLD AUTO: 13 K/UL — CRITICAL LOW (ref 150–400)
POTASSIUM SERPL-MCNC: 3.1 MMOL/L — LOW (ref 3.5–5.3)
POTASSIUM SERPL-SCNC: 3.1 MMOL/L — LOW (ref 3.5–5.3)
RBC # BLD: 2.35 M/UL — LOW (ref 4.2–5.8)
RBC # FLD: 14.3 % — SIGNIFICANT CHANGE UP (ref 10.3–14.5)
SODIUM SERPL-SCNC: 144 MMOL/L — SIGNIFICANT CHANGE UP (ref 135–145)
WBC # BLD: 0.85 K/UL — CRITICAL LOW (ref 3.8–10.5)
WBC # FLD AUTO: 0.85 K/UL — CRITICAL LOW (ref 3.8–10.5)

## 2017-04-09 PROCEDURE — 99232 SBSQ HOSP IP/OBS MODERATE 35: CPT

## 2017-04-09 RX ORDER — POTASSIUM CHLORIDE 20 MEQ
40 PACKET (EA) ORAL ONCE
Qty: 0 | Refills: 0 | Status: COMPLETED | OUTPATIENT
Start: 2017-04-09 | End: 2017-04-09

## 2017-04-09 RX ORDER — HYDROCORTISONE 20 MG
100 TABLET ORAL ONCE
Qty: 0 | Refills: 0 | Status: COMPLETED | OUTPATIENT
Start: 2017-04-09 | End: 2017-04-09

## 2017-04-09 RX ORDER — ACETAMINOPHEN 500 MG
650 TABLET ORAL ONCE
Qty: 0 | Refills: 0 | Status: COMPLETED | OUTPATIENT
Start: 2017-04-09 | End: 2017-04-09

## 2017-04-09 RX ORDER — POTASSIUM CHLORIDE 20 MEQ
20 PACKET (EA) ORAL
Qty: 0 | Refills: 0 | Status: DISCONTINUED | OUTPATIENT
Start: 2017-04-09 | End: 2017-04-09

## 2017-04-09 RX ADMIN — PIPERACILLIN AND TAZOBACTAM 25 GRAM(S): 4; .5 INJECTION, POWDER, LYOPHILIZED, FOR SOLUTION INTRAVENOUS at 21:25

## 2017-04-09 RX ADMIN — PIPERACILLIN AND TAZOBACTAM 25 GRAM(S): 4; .5 INJECTION, POWDER, LYOPHILIZED, FOR SOLUTION INTRAVENOUS at 04:51

## 2017-04-09 RX ADMIN — Medication 100 MILLIGRAM(S): at 10:27

## 2017-04-09 RX ADMIN — LOSARTAN POTASSIUM 100 MILLIGRAM(S): 100 TABLET, FILM COATED ORAL at 04:54

## 2017-04-09 RX ADMIN — Medication 40 MILLIEQUIVALENT(S): at 12:58

## 2017-04-09 RX ADMIN — Medication 650 MILLIGRAM(S): at 19:14

## 2017-04-09 RX ADMIN — Medication 100 MILLIGRAM(S): at 19:14

## 2017-04-09 RX ADMIN — FLUCONAZOLE 200 MILLIGRAM(S): 150 TABLET ORAL at 12:58

## 2017-04-09 RX ADMIN — Medication 100 MILLIGRAM(S): at 17:59

## 2017-04-09 RX ADMIN — Medication 100 MILLIGRAM(S): at 02:27

## 2017-04-09 RX ADMIN — PIPERACILLIN AND TAZOBACTAM 25 GRAM(S): 4; .5 INJECTION, POWDER, LYOPHILIZED, FOR SOLUTION INTRAVENOUS at 15:07

## 2017-04-09 NOTE — PHYSICAL THERAPY INITIAL EVALUATION ADULT - DISCHARGE DISPOSITION, PT EVAL
Home with Home PT for strengthening, bed mob, transfer, gait and balance training/home w/ home PT/home/home w/ assist

## 2017-04-09 NOTE — PHYSICAL THERAPY INITIAL EVALUATION ADULT - PERTINENT HX OF CURRENT PROBLEM, REHAB EVAL
83yoM recent dx of AML, p/w abdominal pain, 4/7/17 CXR, bibasilar peripheral reticular opacities, 4/6/17 ECG, prolong QT

## 2017-04-09 NOTE — PHYSICAL THERAPY INITIAL EVALUATION ADULT - ADDITIONAL COMMENTS
as per pt, pt resides in a PH with son, no HHA, 3 KRISTINE no railing. pt stays on 1st floor, + shower chair. PTA, amb I with SC or rollator when needed, amb I sometimes, I with ADLs, 3 children takes turn to provide the assist as needed

## 2017-04-10 ENCOUNTER — TRANSCRIPTION ENCOUNTER (OUTPATIENT)
Age: 82
End: 2017-04-10

## 2017-04-10 ENCOUNTER — APPOINTMENT (OUTPATIENT)
Dept: INFUSION THERAPY | Facility: HOSPITAL | Age: 82
End: 2017-04-10

## 2017-04-10 LAB
ANION GAP SERPL CALC-SCNC: 13 MMOL/L — SIGNIFICANT CHANGE UP (ref 5–17)
BASOPHILS # BLD AUTO: 0 K/UL — SIGNIFICANT CHANGE UP (ref 0–0.2)
BASOPHILS NFR BLD AUTO: 0 % — SIGNIFICANT CHANGE UP (ref 0–2)
BUN SERPL-MCNC: 15 MG/DL — SIGNIFICANT CHANGE UP (ref 7–23)
CALCIUM SERPL-MCNC: 9 MG/DL — SIGNIFICANT CHANGE UP (ref 8.4–10.5)
CHLORIDE SERPL-SCNC: 107 MMOL/L — SIGNIFICANT CHANGE UP (ref 96–108)
CO2 SERPL-SCNC: 24 MMOL/L — SIGNIFICANT CHANGE UP (ref 22–31)
CREAT SERPL-MCNC: 0.61 MG/DL — SIGNIFICANT CHANGE UP (ref 0.5–1.3)
EOSINOPHIL # BLD AUTO: 0 K/UL — SIGNIFICANT CHANGE UP (ref 0–0.5)
EOSINOPHIL NFR BLD AUTO: 0 % — SIGNIFICANT CHANGE UP (ref 0–6)
GLUCOSE SERPL-MCNC: 107 MG/DL — HIGH (ref 70–99)
HCT VFR BLD CALC: 22.7 % — LOW (ref 39–50)
HGB BLD-MCNC: 7.9 G/DL — LOW (ref 13–17)
LYMPHOCYTES # BLD AUTO: 0.4 K/UL — LOW (ref 1–3.3)
LYMPHOCYTES # BLD AUTO: 54.5 % — HIGH (ref 13–44)
MCHC RBC-ENTMCNC: 28.5 PG — SIGNIFICANT CHANGE UP (ref 27–34)
MCHC RBC-ENTMCNC: 34.8 GM/DL — SIGNIFICANT CHANGE UP (ref 32–36)
MCV RBC AUTO: 81.9 FL — SIGNIFICANT CHANGE UP (ref 80–100)
MONOCYTES # BLD AUTO: 0.01 K/UL — SIGNIFICANT CHANGE UP (ref 0–0.9)
MONOCYTES NFR BLD AUTO: 1.8 % — LOW (ref 2–14)
NEUTROPHILS # BLD AUTO: 0.25 K/UL — LOW (ref 1.8–7.4)
NEUTROPHILS NFR BLD AUTO: 27.3 % — LOW (ref 43–77)
PLATELET # BLD AUTO: 10 K/UL — CRITICAL LOW (ref 150–400)
POTASSIUM SERPL-MCNC: 3.6 MMOL/L — SIGNIFICANT CHANGE UP (ref 3.5–5.3)
POTASSIUM SERPL-SCNC: 3.6 MMOL/L — SIGNIFICANT CHANGE UP (ref 3.5–5.3)
RBC # BLD: 2.77 M/UL — LOW (ref 4.2–5.8)
RBC # FLD: 14.1 % — SIGNIFICANT CHANGE UP (ref 10.3–14.5)
SODIUM SERPL-SCNC: 144 MMOL/L — SIGNIFICANT CHANGE UP (ref 135–145)
WBC # BLD: 0.74 K/UL — CRITICAL LOW (ref 3.8–10.5)
WBC # FLD AUTO: 0.74 K/UL — CRITICAL LOW (ref 3.8–10.5)

## 2017-04-10 PROCEDURE — 99233 SBSQ HOSP IP/OBS HIGH 50: CPT

## 2017-04-10 RX ORDER — ACETAMINOPHEN 500 MG
650 TABLET ORAL ONCE
Qty: 0 | Refills: 0 | Status: COMPLETED | OUTPATIENT
Start: 2017-04-10 | End: 2017-04-10

## 2017-04-10 RX ORDER — AMLODIPINE BESYLATE 2.5 MG/1
5 TABLET ORAL DAILY
Qty: 0 | Refills: 0 | Status: DISCONTINUED | OUTPATIENT
Start: 2017-04-10 | End: 2017-04-11

## 2017-04-10 RX ORDER — HYDROCORTISONE 20 MG
100 TABLET ORAL ONCE
Qty: 0 | Refills: 0 | Status: COMPLETED | OUTPATIENT
Start: 2017-04-10 | End: 2017-04-10

## 2017-04-10 RX ORDER — LORATADINE 10 MG/1
10 TABLET ORAL ONCE
Qty: 0 | Refills: 0 | Status: COMPLETED | OUTPATIENT
Start: 2017-04-10 | End: 2017-04-10

## 2017-04-10 RX ADMIN — Medication 100 MILLIGRAM(S): at 18:12

## 2017-04-10 RX ADMIN — PIPERACILLIN AND TAZOBACTAM 25 GRAM(S): 4; .5 INJECTION, POWDER, LYOPHILIZED, FOR SOLUTION INTRAVENOUS at 21:21

## 2017-04-10 RX ADMIN — PIPERACILLIN AND TAZOBACTAM 25 GRAM(S): 4; .5 INJECTION, POWDER, LYOPHILIZED, FOR SOLUTION INTRAVENOUS at 14:11

## 2017-04-10 RX ADMIN — PIPERACILLIN AND TAZOBACTAM 25 GRAM(S): 4; .5 INJECTION, POWDER, LYOPHILIZED, FOR SOLUTION INTRAVENOUS at 06:39

## 2017-04-10 RX ADMIN — FLUCONAZOLE 200 MILLIGRAM(S): 150 TABLET ORAL at 12:49

## 2017-04-10 RX ADMIN — Medication 100 MILLIGRAM(S): at 10:33

## 2017-04-10 RX ADMIN — LORATADINE 10 MILLIGRAM(S): 10 TABLET ORAL at 12:49

## 2017-04-10 RX ADMIN — Medication 650 MILLIGRAM(S): at 12:49

## 2017-04-10 RX ADMIN — AMLODIPINE BESYLATE 5 MILLIGRAM(S): 2.5 TABLET ORAL at 09:34

## 2017-04-10 RX ADMIN — Medication 100 MILLIGRAM(S): at 01:57

## 2017-04-10 RX ADMIN — Medication 100 MILLIGRAM(S): at 12:49

## 2017-04-10 RX ADMIN — LOSARTAN POTASSIUM 100 MILLIGRAM(S): 100 TABLET, FILM COATED ORAL at 06:39

## 2017-04-10 NOTE — DISCHARGE NOTE ADULT - PATIENT PORTAL LINK FT
“You can access the [employer] Patient Portal, offered by [facility], by registering with the following website: [email or web address]”

## 2017-04-10 NOTE — DISCHARGE NOTE ADULT - MEDICATION SUMMARY - MEDICATIONS TO TAKE
I will START or STAY ON the medications listed below when I get home from the hospital:    Flagyl 500 mg oral tablet  -- 1 tab(s) by mouth every 8 hours x 10 days  -- Indication: For Diverticulitis of large intestine with abscess without bleeding    oxyCODONE 5 mg oral tablet  -- 1 tab(s) by mouth every 6 hours, As Needed  -- Indication: For Pain    ondansetron 4 mg oral tablet  -- 1 tab(s) by mouth every 8 hours, As Needed for nausea  -- Indication: For Nausea    fluconazole 200 mg oral tablet  -- 1 tab(s) by mouth once a day  -- Indication: For Prophylaxis    losartan-hydroCHLOROthiazide 100mg-25mg oral tablet  -- 1 tab(s) by mouth once a day  -- Indication: For Hypertension    amLODIPine 5 mg oral tablet  -- 1 tab(s) by mouth once a day If sbp>140  -- Indication: For Hypertension    Promacta 50 mg oral tablet  -- 1 tab(s) by mouth once a day  -- Indication: For Thrombocytopenia    Cipro 500 mg oral tablet  -- 1 tab(s) by mouth every 12 hours x 10 days  -- Indication: For Diverticulitis of large intestine with abscess without bleeding

## 2017-04-10 NOTE — DISCHARGE NOTE ADULT - PLAN OF CARE
Resolution Continue antibiotic, f/u with your primary doctor in 1 week. F/U with your oncologist as recommended. Low salt diet  Activity as tolerated.  Take all medication as prescribed.  Follow up with your medical doctor for routine blood pressure monitoring at your next visit.  Notify your doctor if you have any of the following symptoms:   Dizziness, Lightheadedness, Blurry vision, Headache, Chest pain, Shortness of breath

## 2017-04-10 NOTE — DISCHARGE NOTE ADULT - CARE PLAN
Principal Discharge DX:	Diverticulitis of large intestine with abscess without bleeding  Goal:	Resolution  Instructions for follow-up, activity and diet:	Continue antibiotic, f/u with your primary doctor in 1 week.  Secondary Diagnosis:	AML (acute myeloblastic leukemia)  Instructions for follow-up, activity and diet:	F/U with your oncologist as recommended.  Secondary Diagnosis:	Hypertension  Instructions for follow-up, activity and diet:	Low salt diet  Activity as tolerated.  Take all medication as prescribed.  Follow up with your medical doctor for routine blood pressure monitoring at your next visit.  Notify your doctor if you have any of the following symptoms:   Dizziness, Lightheadedness, Blurry vision, Headache, Chest pain, Shortness of breath

## 2017-04-10 NOTE — PROVIDER CONTACT NOTE (OTHER) - ACTION/TREATMENT ORDERED:
no further orders at this time
recheck manually in 30 minutes after lisinopril
NP Jessica Hickman to order HCTZ; RN to administer as ordered and reevaluate BP

## 2017-04-10 NOTE — DISCHARGE NOTE ADULT - HOME CARE AGENCY
[facility]. Nurse, Physical therapist to arrange visit day after your discharged from the hospital. [phone] [facility]. Nurse, Physical therapist to arrange visit day after your discharged from the hospital. Nurse will assess for home health aide. [phone]

## 2017-04-10 NOTE — DISCHARGE NOTE ADULT - HOSPITAL COURSE
By attending Colonic diverticulosis.Focal wall thickening   of the mid descending colon with associated infiltration of the   pericolonic fat and small amount of fluid in the left paracolic gutter. A   1.2 x 0.9 x 2.8 cm low-attenuation collection likely represents a small   intramural abscess. Appendix is within normal limits. Small periampullary   duodenal diverticulum. Wall thickening versus underdistention in the base   of the cecum.          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 Outpatient CT revealed diverticulitis with a1.2 x 0.9 x 2.8 cm low-attenuation collection likely representing a small intramural abscess.     Pt admitted and started on iv flagyl and zosyn to treat presumed gram negative and anaerobic bacterial infection. Pain gradually improved, pt able to tolerate po intake.     Seen by surgery, no intervention recommended. Pt and family do not want a colonoscopy for follow up.     Given 5 units of platelets over very slow infusions as h/o being refractory to plt transfusions. HLA matched platelets requested. Transfused 1 unit of blood for anemia of 6.9.     Norvasc given for uncontrolled htn.     Discharged on po abx.     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 Outpatient CT revealed diverticulitis with a1.2 x 0.9 x 2.8 cm low-attenuation collection likely representing a small intramural abscess.     Pt admitted and started on iv flagyl and zosyn to treat presumed gram negative and anaerobic bacterial infection. Pain gradually improved, pt able to tolerate po intake.     Seen by surgery, no intervention recommended. Pt and family do not want a colonoscopy for follow up.     Given 5 units of platelets over very slow infusions as h/o being refractory to plt transfusions. HLA matched platelets requested. Transfused 1 unit of blood for anemia of 6.9.     Norvasc given for uncontrolled htn.     Discharged on po abx.     Diagnoses: Acute diverticulitis; Intramural abscess; AML; Neutropenia; Thrombocytopenia; Uncontrolled htn; Anemia complicating neoplastic disease; Pancytopenia; Hypokalemia; Hypomagnesemia; Dehydration

## 2017-04-11 ENCOUNTER — RESULT REVIEW (OUTPATIENT)
Age: 82
End: 2017-04-11

## 2017-04-11 VITALS
HEART RATE: 95 BPM | DIASTOLIC BLOOD PRESSURE: 71 MMHG | TEMPERATURE: 98 F | RESPIRATION RATE: 16 BRPM | SYSTOLIC BLOOD PRESSURE: 146 MMHG | OXYGEN SATURATION: 99 %

## 2017-04-11 LAB
BASOPHILS # BLD AUTO: 0 K/UL — SIGNIFICANT CHANGE UP (ref 0–0.2)
BASOPHILS NFR BLD AUTO: 0 % — SIGNIFICANT CHANGE UP (ref 0–2)
CULTURE RESULTS: SIGNIFICANT CHANGE UP
CULTURE RESULTS: SIGNIFICANT CHANGE UP
EOSINOPHIL # BLD AUTO: 0 K/UL — SIGNIFICANT CHANGE UP (ref 0–0.5)
EOSINOPHIL NFR BLD AUTO: 0 % — SIGNIFICANT CHANGE UP (ref 0–6)
HCT VFR BLD CALC: 24.5 % — LOW (ref 39–50)
HGB BLD-MCNC: 8.8 G/DL — LOW (ref 13–17)
LYMPHOCYTES # BLD AUTO: 0.7 K/UL — LOW (ref 1–3.3)
LYMPHOCYTES # BLD AUTO: 79.8 % — HIGH (ref 13–44)
MCHC RBC-ENTMCNC: 30.8 PG — SIGNIFICANT CHANGE UP (ref 27–34)
MCHC RBC-ENTMCNC: 36.1 GM/DL — HIGH (ref 32–36)
MCV RBC AUTO: 85.4 FL — SIGNIFICANT CHANGE UP (ref 80–100)
MONOCYTES # BLD AUTO: 0 K/UL — SIGNIFICANT CHANGE UP (ref 0–0.9)
MONOCYTES NFR BLD AUTO: 1 % — LOW (ref 2–14)
NEUTROPHILS # BLD AUTO: 0.2 K/UL — LOW (ref 1.8–7.4)
NEUTROPHILS NFR BLD AUTO: 19.2 % — LOW (ref 43–77)
PLAT MORPH BLD: NORMAL — SIGNIFICANT CHANGE UP
PLATELET # BLD AUTO: 26 K/UL — LOW (ref 150–400)
RBC # BLD: 2.87 M/UL — LOW (ref 4.2–5.8)
RBC # FLD: 13.2 % — SIGNIFICANT CHANGE UP (ref 10.3–14.5)
RBC BLD AUTO: SIGNIFICANT CHANGE UP
SPECIMEN SOURCE: SIGNIFICANT CHANGE UP
SPECIMEN SOURCE: SIGNIFICANT CHANGE UP
WBC # BLD: 0.9 K/UL — CRITICAL LOW (ref 3.8–10.5)
WBC # FLD AUTO: 0.9 K/UL — CRITICAL LOW (ref 3.8–10.5)

## 2017-04-11 PROCEDURE — 86923 COMPATIBILITY TEST ELECTRIC: CPT

## 2017-04-11 PROCEDURE — 36430 TRANSFUSION BLD/BLD COMPNT: CPT

## 2017-04-11 PROCEDURE — 96374 THER/PROPH/DIAG INJ IV PUSH: CPT

## 2017-04-11 PROCEDURE — 82435 ASSAY OF BLOOD CHLORIDE: CPT

## 2017-04-11 PROCEDURE — 86900 BLOOD TYPING SEROLOGIC ABO: CPT

## 2017-04-11 PROCEDURE — 85014 HEMATOCRIT: CPT

## 2017-04-11 PROCEDURE — 99232 SBSQ HOSP IP/OBS MODERATE 35: CPT | Mod: GC

## 2017-04-11 PROCEDURE — 80053 COMPREHEN METABOLIC PANEL: CPT

## 2017-04-11 PROCEDURE — 84295 ASSAY OF SERUM SODIUM: CPT

## 2017-04-11 PROCEDURE — 99285 EMERGENCY DEPT VISIT HI MDM: CPT | Mod: 25

## 2017-04-11 PROCEDURE — 85610 PROTHROMBIN TIME: CPT

## 2017-04-11 PROCEDURE — 86850 RBC ANTIBODY SCREEN: CPT

## 2017-04-11 PROCEDURE — 80048 BASIC METABOLIC PNL TOTAL CA: CPT

## 2017-04-11 PROCEDURE — 85730 THROMBOPLASTIN TIME PARTIAL: CPT

## 2017-04-11 PROCEDURE — 84100 ASSAY OF PHOSPHORUS: CPT

## 2017-04-11 PROCEDURE — 84132 ASSAY OF SERUM POTASSIUM: CPT

## 2017-04-11 PROCEDURE — 82330 ASSAY OF CALCIUM: CPT

## 2017-04-11 PROCEDURE — 83735 ASSAY OF MAGNESIUM: CPT

## 2017-04-11 PROCEDURE — 97161 PT EVAL LOW COMPLEX 20 MIN: CPT

## 2017-04-11 PROCEDURE — 85027 COMPLETE CBC AUTOMATED: CPT

## 2017-04-11 PROCEDURE — 83690 ASSAY OF LIPASE: CPT

## 2017-04-11 PROCEDURE — 82947 ASSAY GLUCOSE BLOOD QUANT: CPT

## 2017-04-11 PROCEDURE — 93005 ELECTROCARDIOGRAM TRACING: CPT

## 2017-04-11 PROCEDURE — 82565 ASSAY OF CREATININE: CPT

## 2017-04-11 PROCEDURE — 71045 X-RAY EXAM CHEST 1 VIEW: CPT

## 2017-04-11 PROCEDURE — 99239 HOSP IP/OBS DSCHRG MGMT >30: CPT

## 2017-04-11 PROCEDURE — P9011: CPT

## 2017-04-11 PROCEDURE — 83605 ASSAY OF LACTIC ACID: CPT

## 2017-04-11 PROCEDURE — 74177 CT ABD & PELVIS W/CONTRAST: CPT

## 2017-04-11 PROCEDURE — P9040: CPT

## 2017-04-11 PROCEDURE — 86901 BLOOD TYPING SEROLOGIC RH(D): CPT

## 2017-04-11 PROCEDURE — 82803 BLOOD GASES ANY COMBINATION: CPT

## 2017-04-11 PROCEDURE — 87040 BLOOD CULTURE FOR BACTERIA: CPT

## 2017-04-11 RX ORDER — AMLODIPINE BESYLATE 2.5 MG/1
1 TABLET ORAL
Qty: 30 | Refills: 0 | OUTPATIENT
Start: 2017-04-11 | End: 2017-05-11

## 2017-04-11 RX ORDER — ONDANSETRON 8 MG/1
1 TABLET, FILM COATED ORAL
Qty: 0 | Refills: 0 | COMMUNITY

## 2017-04-11 RX ORDER — MOXIFLOXACIN HYDROCHLORIDE TABLETS, 400 MG 400 MG/1
1 TABLET, FILM COATED ORAL
Qty: 0 | Refills: 0 | COMMUNITY

## 2017-04-11 RX ORDER — METRONIDAZOLE 500 MG
1 TABLET ORAL
Qty: 30 | Refills: 0 | OUTPATIENT
Start: 2017-04-11 | End: 2017-04-21

## 2017-04-11 RX ORDER — AMLODIPINE BESYLATE 2.5 MG/1
5 TABLET ORAL ONCE
Qty: 0 | Refills: 0 | Status: COMPLETED | OUTPATIENT
Start: 2017-04-11 | End: 2017-04-11

## 2017-04-11 RX ORDER — METRONIDAZOLE 500 MG
1 TABLET ORAL
Qty: 0 | Refills: 0 | COMMUNITY

## 2017-04-11 RX ORDER — AMLODIPINE BESYLATE 2.5 MG/1
1 TABLET ORAL
Qty: 0 | Refills: 0 | COMMUNITY
Start: 2017-04-11

## 2017-04-11 RX ORDER — MOXIFLOXACIN HYDROCHLORIDE TABLETS, 400 MG 400 MG/1
1 TABLET, FILM COATED ORAL
Qty: 20 | Refills: 0 | OUTPATIENT
Start: 2017-04-11 | End: 2017-04-21

## 2017-04-11 RX ADMIN — FLUCONAZOLE 200 MILLIGRAM(S): 150 TABLET ORAL at 13:10

## 2017-04-11 RX ADMIN — AMLODIPINE BESYLATE 5 MILLIGRAM(S): 2.5 TABLET ORAL at 10:33

## 2017-04-11 RX ADMIN — LOSARTAN POTASSIUM 100 MILLIGRAM(S): 100 TABLET, FILM COATED ORAL at 05:43

## 2017-04-11 RX ADMIN — AMLODIPINE BESYLATE 5 MILLIGRAM(S): 2.5 TABLET ORAL at 05:43

## 2017-04-11 RX ADMIN — Medication 100 MILLIGRAM(S): at 10:32

## 2017-04-11 RX ADMIN — PIPERACILLIN AND TAZOBACTAM 25 GRAM(S): 4; .5 INJECTION, POWDER, LYOPHILIZED, FOR SOLUTION INTRAVENOUS at 05:44

## 2017-04-11 RX ADMIN — Medication 100 MILLIGRAM(S): at 01:50

## 2017-04-11 NOTE — PROVIDER CONTACT NOTE (CRITICAL VALUE NOTIFICATION) - SITUATION
WBC 0.6, Platelet 5
Platelet count 4; WBC 0.87
Potassium 2.6
Potassium 2.9
WBC 0.74 smear reviewed, result confirmed; platelet count 10 history reviewed, result confirmed
WBC 0.9

## 2017-04-11 NOTE — PROVIDER CONTACT NOTE (CRITICAL VALUE NOTIFICATION) - TEST AND RESULT REPORTED:
Platelet count 4; WBC 0.87
Potassium 2.9
WBC 0.74; platelet count 10
WBC 0.9
WBC=0.85  HGB=6.9  HCT=19.5  PLT=13
potassium 2.6
WBC 0.6, Platelet 5

## 2017-04-11 NOTE — PROVIDER CONTACT NOTE (CRITICAL VALUE NOTIFICATION) - ACTION/TREATMENT ORDERED:
transfuse as ordered continue IV antibiotics as ordered
NP to look into interventions and order as necessary
NP to look into results and order interventions as necessary
No interventions necessary at this time
TAINA Jara to look into results and order interventions as necessary
TAINA chowdary to look into results and order interventions as necessary
replacement potassium already ordered and administered for supplementation

## 2017-04-11 NOTE — PROVIDER CONTACT NOTE (CRITICAL VALUE NOTIFICATION) - NAME OF MD/NP/PA/DO NOTIFIED:
SANDOVAL Thibodeaux NP
TAINA Birmingham
TAINA Jara
TAINA Morocho
TAINA chowdary
TAINA chowdary
TAINA Jara

## 2017-04-12 ENCOUNTER — APPOINTMENT (OUTPATIENT)
Dept: HEMATOLOGY ONCOLOGY | Facility: CLINIC | Age: 82
End: 2017-04-12

## 2017-04-12 VITALS
TEMPERATURE: 98.5 F | RESPIRATION RATE: 16 BRPM | BODY MASS INDEX: 27.14 KG/M2 | HEART RATE: 93 BPM | DIASTOLIC BLOOD PRESSURE: 65 MMHG | SYSTOLIC BLOOD PRESSURE: 111 MMHG | WEIGHT: 160.93 LBS | OXYGEN SATURATION: 98 %

## 2017-04-12 DIAGNOSIS — K57.20 DIVERTICULITIS OF LARGE INTESTINE WITH PERFORATION AND ABSCESS W/OUT BLEEDING: ICD-10-CM

## 2017-04-12 LAB
EOSINOPHIL # BLD AUTO: 0 K/UL — SIGNIFICANT CHANGE UP (ref 0–0.5)
EOSINOPHIL NFR BLD AUTO: 1 % — SIGNIFICANT CHANGE UP (ref 0–6)
HCT VFR BLD CALC: 22.9 % — LOW (ref 39–50)
HGB BLD-MCNC: 8.1 G/DL — LOW (ref 13–17)
LYMPHOCYTES # BLD AUTO: 0.5 K/UL — LOW (ref 1–3.3)
LYMPHOCYTES # BLD AUTO: 65 % — HIGH (ref 13–44)
MCHC RBC-ENTMCNC: 30.3 PG — SIGNIFICANT CHANGE UP (ref 27–34)
MCHC RBC-ENTMCNC: 35.4 G/DL — SIGNIFICANT CHANGE UP (ref 32–36)
MCV RBC AUTO: 85.6 FL — SIGNIFICANT CHANGE UP (ref 80–100)
MONOCYTES # BLD AUTO: 0 K/UL — SIGNIFICANT CHANGE UP (ref 0–0.9)
MONOCYTES NFR BLD AUTO: 3 % — SIGNIFICANT CHANGE UP (ref 2–14)
NEUTROPHILS # BLD AUTO: 0.1 K/UL — LOW (ref 1.8–7.4)
NEUTROPHILS NFR BLD AUTO: 30 % — LOW (ref 43–77)
NEUTS BAND # BLD: 1 % — SIGNIFICANT CHANGE UP (ref 0–8)
PLAT MORPH BLD: NORMAL — SIGNIFICANT CHANGE UP
PLATELET # BLD AUTO: 17 K/UL — CRITICAL LOW (ref 150–400)
RBC # BLD: 2.67 M/UL — LOW (ref 4.2–5.8)
RBC # FLD: 13.1 % — SIGNIFICANT CHANGE UP (ref 10.3–14.5)
RBC BLD AUTO: SIGNIFICANT CHANGE UP
WBC # BLD: 0.6 K/UL — CRITICAL LOW (ref 3.8–10.5)
WBC # FLD AUTO: 0.6 K/UL — CRITICAL LOW (ref 3.8–10.5)

## 2017-04-12 RX ORDER — METRONIDAZOLE 500 MG/1
500 TABLET ORAL
Qty: 21 | Refills: 0 | Status: ACTIVE | COMMUNITY
Start: 2017-04-12

## 2017-04-12 RX ORDER — HYDRALAZINE HYDROCHLORIDE 25 MG/1
25 TABLET ORAL 3 TIMES DAILY
Refills: 0 | Status: DISCONTINUED | COMMUNITY
Start: 2017-03-08 | End: 2017-04-12

## 2017-04-12 RX ORDER — CIPROFLOXACIN HYDROCHLORIDE 500 MG/1
500 TABLET, FILM COATED ORAL
Qty: 14 | Refills: 0 | Status: ACTIVE | COMMUNITY
Start: 2017-04-12

## 2017-04-13 ENCOUNTER — APPOINTMENT (OUTPATIENT)
Dept: INFUSION THERAPY | Facility: HOSPITAL | Age: 82
End: 2017-04-13

## 2017-04-14 ENCOUNTER — RESULT REVIEW (OUTPATIENT)
Age: 82
End: 2017-04-14

## 2017-04-15 ENCOUNTER — APPOINTMENT (OUTPATIENT)
Dept: INFUSION THERAPY | Facility: HOSPITAL | Age: 82
End: 2017-04-15

## 2017-04-15 LAB
EOSINOPHIL # BLD AUTO: 0.1 K/UL — SIGNIFICANT CHANGE UP (ref 0–0.5)
HCT VFR BLD CALC: 22.9 % — LOW (ref 39–50)
HGB BLD-MCNC: 8 G/DL — LOW (ref 13–17)
LYMPHOCYTES # BLD AUTO: 0.6 K/UL — LOW (ref 1–3.3)
LYMPHOCYTES # BLD AUTO: 82 % — HIGH (ref 13–44)
MCHC RBC-ENTMCNC: 29.8 PG — SIGNIFICANT CHANGE UP (ref 27–34)
MCHC RBC-ENTMCNC: 35.1 G/DL — SIGNIFICANT CHANGE UP (ref 32–36)
MCV RBC AUTO: 85.1 FL — SIGNIFICANT CHANGE UP (ref 80–100)
MONOCYTES NFR BLD AUTO: 2 % — SIGNIFICANT CHANGE UP (ref 2–14)
NEUTROPHILS # BLD AUTO: 0.1 K/UL — LOW (ref 1.8–7.4)
NEUTROPHILS NFR BLD AUTO: 16 % — LOW (ref 43–77)
PLAT MORPH BLD: NORMAL — SIGNIFICANT CHANGE UP
PLATELET # BLD AUTO: 6 K/UL — CRITICAL LOW (ref 150–400)
PLATELET # BLD MANUAL: 4 K/UL — CRITICAL LOW (ref 150–400)
RBC # BLD: 2.69 M/UL — LOW (ref 4.2–5.8)
RBC # FLD: 12.7 % — SIGNIFICANT CHANGE UP (ref 10.3–14.5)
RBC BLD AUTO: SIGNIFICANT CHANGE UP
WBC # BLD: 0.8 K/UL — CRITICAL LOW (ref 3.8–10.5)
WBC # FLD AUTO: 0.8 K/UL — CRITICAL LOW (ref 3.8–10.5)

## 2017-04-16 ENCOUNTER — RESULT REVIEW (OUTPATIENT)
Age: 82
End: 2017-04-16

## 2017-04-16 ENCOUNTER — OUTPATIENT (OUTPATIENT)
Dept: OUTPATIENT SERVICES | Facility: HOSPITAL | Age: 82
LOS: 1 days | Discharge: HOME | End: 2017-04-16

## 2017-04-17 ENCOUNTER — APPOINTMENT (OUTPATIENT)
Dept: INFUSION THERAPY | Facility: HOSPITAL | Age: 82
End: 2017-04-17

## 2017-04-17 DIAGNOSIS — C95.00 ACUTE LEUKEMIA OF UNSPECIFIED CELL TYPE NOT HAVING ACHIEVED REMISSION: ICD-10-CM

## 2017-04-17 LAB
BASOPHILS # BLD AUTO: 0 K/UL — SIGNIFICANT CHANGE UP (ref 0–0.2)
BLD GP AB SCN SERPL QL: NEGATIVE — SIGNIFICANT CHANGE UP
EOSINOPHIL # BLD AUTO: 0 K/UL — SIGNIFICANT CHANGE UP (ref 0–0.5)
EOSINOPHIL NFR BLD AUTO: 2 % — SIGNIFICANT CHANGE UP (ref 0–6)
HCT VFR BLD CALC: 21.8 % — LOW (ref 39–50)
HGB BLD-MCNC: 7.8 G/DL — LOW (ref 13–17)
LYMPHOCYTES # BLD AUTO: 0.5 K/UL — LOW (ref 1–3.3)
LYMPHOCYTES # BLD AUTO: 78 % — HIGH (ref 13–44)
MCHC RBC-ENTMCNC: 30.4 PG — SIGNIFICANT CHANGE UP (ref 27–34)
MCHC RBC-ENTMCNC: 35.7 G/DL — SIGNIFICANT CHANGE UP (ref 32–36)
MCV RBC AUTO: 85.1 FL — SIGNIFICANT CHANGE UP (ref 80–100)
MONOCYTES # BLD AUTO: 0 K/UL — SIGNIFICANT CHANGE UP (ref 0–0.9)
MONOCYTES NFR BLD AUTO: 4 % — SIGNIFICANT CHANGE UP (ref 2–14)
NEUTROPHILS # BLD AUTO: 0 K/UL — LOW (ref 1.8–7.4)
NEUTROPHILS NFR BLD AUTO: 16 % — LOW (ref 43–77)
PLAT MORPH BLD: NORMAL — SIGNIFICANT CHANGE UP
PLATELET # BLD AUTO: 3 K/UL — CRITICAL LOW (ref 150–400)
PLATELET # BLD MANUAL: 6 K/UL — CRITICAL LOW (ref 150–400)
RBC # BLD: 2.56 M/UL — LOW (ref 4.2–5.8)
RBC # FLD: 12.9 % — SIGNIFICANT CHANGE UP (ref 10.3–14.5)
RBC BLD AUTO: SIGNIFICANT CHANGE UP
RH IG SCN BLD-IMP: POSITIVE — SIGNIFICANT CHANGE UP
WBC # BLD: 0.6 K/UL — CRITICAL LOW (ref 3.8–10.5)
WBC # FLD AUTO: 0.6 K/UL — CRITICAL LOW (ref 3.8–10.5)

## 2017-04-18 ENCOUNTER — APPOINTMENT (OUTPATIENT)
Dept: INFUSION THERAPY | Facility: HOSPITAL | Age: 82
End: 2017-04-18

## 2017-04-18 DIAGNOSIS — Z51.89 ENCOUNTER FOR OTHER SPECIFIED AFTERCARE: ICD-10-CM

## 2017-04-18 DIAGNOSIS — R11.2 NAUSEA WITH VOMITING, UNSPECIFIED: ICD-10-CM

## 2017-04-18 DIAGNOSIS — Z51.11 ENCOUNTER FOR ANTINEOPLASTIC CHEMOTHERAPY: ICD-10-CM

## 2017-04-19 ENCOUNTER — RESULT REVIEW (OUTPATIENT)
Age: 82
End: 2017-04-19

## 2017-04-20 ENCOUNTER — APPOINTMENT (OUTPATIENT)
Dept: INFUSION THERAPY | Facility: HOSPITAL | Age: 82
End: 2017-04-20

## 2017-04-20 LAB
BASOPHILS # BLD AUTO: 0 K/UL — SIGNIFICANT CHANGE UP (ref 0–0.2)
BASOPHILS NFR BLD AUTO: 2 % — SIGNIFICANT CHANGE UP (ref 0–2)
BLASTS # FLD: 2 % — HIGH (ref 0–0)
EOSINOPHIL # BLD AUTO: 0 K/UL — SIGNIFICANT CHANGE UP (ref 0–0.5)
HCT VFR BLD CALC: 27.4 % — LOW (ref 39–50)
HGB BLD-MCNC: 9.8 G/DL — LOW (ref 13–17)
LYMPHOCYTES # BLD AUTO: 0.7 K/UL — LOW (ref 1–3.3)
LYMPHOCYTES # BLD AUTO: 92 % — HIGH (ref 13–44)
MCHC RBC-ENTMCNC: 30 PG — SIGNIFICANT CHANGE UP (ref 27–34)
MCHC RBC-ENTMCNC: 35.9 G/DL — SIGNIFICANT CHANGE UP (ref 32–36)
MCV RBC AUTO: 83.7 FL — SIGNIFICANT CHANGE UP (ref 80–100)
MONOCYTES # BLD AUTO: 0 K/UL — SIGNIFICANT CHANGE UP (ref 0–0.9)
NEUTROPHILS # BLD AUTO: 0.1 K/UL — LOW (ref 1.8–7.4)
NEUTROPHILS NFR BLD AUTO: 4 % — LOW (ref 43–77)
PLAT MORPH BLD: NORMAL — SIGNIFICANT CHANGE UP
PLATELET # BLD AUTO: 2 K/UL — CRITICAL LOW (ref 150–400)
PLATELET # BLD MANUAL: 3 K/UL — CRITICAL LOW (ref 150–400)
RBC # BLD: 3.28 M/UL — LOW (ref 4.2–5.8)
RBC # FLD: 12.9 % — SIGNIFICANT CHANGE UP (ref 10.3–14.5)
RBC BLD AUTO: SIGNIFICANT CHANGE UP
WBC # BLD: 0.8 K/UL — CRITICAL LOW (ref 3.8–10.5)
WBC # FLD AUTO: 0.8 K/UL — CRITICAL LOW (ref 3.8–10.5)

## 2017-04-23 ENCOUNTER — RESULT REVIEW (OUTPATIENT)
Age: 82
End: 2017-04-23

## 2017-04-24 ENCOUNTER — OUTPATIENT (OUTPATIENT)
Dept: OUTPATIENT SERVICES | Facility: HOSPITAL | Age: 82
LOS: 1 days | End: 2017-04-24
Payer: MEDICARE

## 2017-04-24 ENCOUNTER — RESULT REVIEW (OUTPATIENT)
Age: 82
End: 2017-04-24

## 2017-04-24 ENCOUNTER — APPOINTMENT (OUTPATIENT)
Dept: INFUSION THERAPY | Facility: HOSPITAL | Age: 82
End: 2017-04-24

## 2017-04-24 DIAGNOSIS — C95.00 ACUTE LEUKEMIA OF UNSPECIFIED CELL TYPE NOT HAVING ACHIEVED REMISSION: ICD-10-CM

## 2017-04-24 LAB
BASOPHILS # BLD AUTO: 0 K/UL — SIGNIFICANT CHANGE UP (ref 0–0.2)
BLASTS # FLD: 1 % — HIGH (ref 0–0)
EOSINOPHIL # BLD AUTO: 0.1 K/UL — SIGNIFICANT CHANGE UP (ref 0–0.5)
HCT VFR BLD CALC: 25.3 % — LOW (ref 39–50)
HGB BLD-MCNC: 9 G/DL — LOW (ref 13–17)
LYMPHOCYTES # BLD AUTO: 0.9 K/UL — LOW (ref 1–3.3)
LYMPHOCYTES # BLD AUTO: 84 % — HIGH (ref 13–44)
MCHC RBC-ENTMCNC: 29.8 PG — SIGNIFICANT CHANGE UP (ref 27–34)
MCHC RBC-ENTMCNC: 35.6 G/DL — SIGNIFICANT CHANGE UP (ref 32–36)
MCV RBC AUTO: 83.6 FL — SIGNIFICANT CHANGE UP (ref 80–100)
MONOCYTES # BLD AUTO: 0 K/UL — SIGNIFICANT CHANGE UP (ref 0–0.9)
MONOCYTES NFR BLD AUTO: 1 % — LOW (ref 2–14)
NEUTROPHILS # BLD AUTO: 0.1 K/UL — LOW (ref 1.8–7.4)
NEUTROPHILS NFR BLD AUTO: 14 % — LOW (ref 43–77)
PLAT MORPH BLD: NORMAL — SIGNIFICANT CHANGE UP
PLATELET # BLD AUTO: 4 K/UL — CRITICAL LOW (ref 150–400)
PLATELET # BLD MANUAL: 2 K/UL — SIGNIFICANT CHANGE UP (ref 150–400)
RBC # BLD: 3.02 M/UL — LOW (ref 4.2–5.8)
RBC # FLD: 12.5 % — SIGNIFICANT CHANGE UP (ref 10.3–14.5)
RBC BLD AUTO: SIGNIFICANT CHANGE UP
WBC # BLD: 1 K/UL — CRITICAL LOW (ref 3.8–10.5)
WBC # FLD AUTO: 1 K/UL — CRITICAL LOW (ref 3.8–10.5)

## 2017-04-25 ENCOUNTER — APPOINTMENT (OUTPATIENT)
Dept: INFUSION THERAPY | Facility: HOSPITAL | Age: 82
End: 2017-04-25

## 2017-04-25 LAB — PLATELET # BLD MANUAL: 2 K/UL — CRITICAL LOW (ref 150–400)

## 2017-04-26 ENCOUNTER — RESULT REVIEW (OUTPATIENT)
Age: 82
End: 2017-04-26

## 2017-04-27 ENCOUNTER — APPOINTMENT (OUTPATIENT)
Dept: INFUSION THERAPY | Facility: HOSPITAL | Age: 82
End: 2017-04-27

## 2017-04-27 LAB
BASOPHILS # BLD AUTO: 0 K/UL — SIGNIFICANT CHANGE UP (ref 0–0.2)
BLD GP AB SCN SERPL QL: NEGATIVE — SIGNIFICANT CHANGE UP
EOSINOPHIL # BLD AUTO: 0 K/UL — SIGNIFICANT CHANGE UP (ref 0–0.5)
HCT VFR BLD CALC: 23.1 % — LOW (ref 39–50)
HGB BLD-MCNC: 8 G/DL — LOW (ref 13–17)
LYMPHOCYTES # BLD AUTO: 0.7 K/UL — LOW (ref 1–3.3)
LYMPHOCYTES # BLD AUTO: 90 % — HIGH (ref 13–44)
MCHC RBC-ENTMCNC: 28.9 PG — SIGNIFICANT CHANGE UP (ref 27–34)
MCHC RBC-ENTMCNC: 34.6 G/DL — SIGNIFICANT CHANGE UP (ref 32–36)
MCV RBC AUTO: 83.6 FL — SIGNIFICANT CHANGE UP (ref 80–100)
MONOCYTES # BLD AUTO: 0 K/UL — SIGNIFICANT CHANGE UP (ref 0–0.9)
MONOCYTES NFR BLD AUTO: 2 % — SIGNIFICANT CHANGE UP (ref 2–14)
NEUTROPHILS # BLD AUTO: 0 K/UL — LOW (ref 1.8–7.4)
NEUTROPHILS NFR BLD AUTO: 6 % — LOW (ref 43–77)
NEUTS BAND # BLD: 2 % — SIGNIFICANT CHANGE UP (ref 0–8)
PLAT MORPH BLD: NORMAL — SIGNIFICANT CHANGE UP
PLATELET # BLD AUTO: 3 K/UL — CRITICAL LOW (ref 150–400)
RBC # BLD: 2.76 M/UL — LOW (ref 4.2–5.8)
RBC # FLD: 12.5 % — SIGNIFICANT CHANGE UP (ref 10.3–14.5)
RBC BLD AUTO: SIGNIFICANT CHANGE UP
RH IG SCN BLD-IMP: POSITIVE — SIGNIFICANT CHANGE UP
WBC # BLD: 0.7 K/UL — CRITICAL LOW (ref 3.8–10.5)
WBC # FLD AUTO: 0.7 K/UL — CRITICAL LOW (ref 3.8–10.5)

## 2017-04-27 RX ORDER — POTASSIUM CHLORIDE 1500 MG/1
20 TABLET, FILM COATED, EXTENDED RELEASE ORAL
Qty: 38 | Refills: 0 | Status: ACTIVE | COMMUNITY
Start: 2017-04-27 | End: 1900-01-01

## 2017-04-28 ENCOUNTER — RESULT REVIEW (OUTPATIENT)
Age: 82
End: 2017-04-28

## 2017-04-29 ENCOUNTER — APPOINTMENT (OUTPATIENT)
Dept: INFUSION THERAPY | Facility: HOSPITAL | Age: 82
End: 2017-04-29

## 2017-04-29 LAB
BUN SERPL-MCNC: 14 MG/DL — SIGNIFICANT CHANGE UP (ref 7–23)
BUN SERPL-MCNC: 15 MG/DL — SIGNIFICANT CHANGE UP (ref 7–23)
CA-I BLDA-SCNC: 1.28 MMOL/L — SIGNIFICANT CHANGE UP (ref 1.12–1.3)
CA-I BLDA-SCNC: 1.33 MMOL/L — HIGH (ref 1.12–1.3)
CHLORIDE SERPL-SCNC: 101 MMOL/L — SIGNIFICANT CHANGE UP (ref 96–108)
CHLORIDE SERPL-SCNC: 99 MMOL/L — SIGNIFICANT CHANGE UP (ref 96–108)
CO2 SERPL-SCNC: 26 MMOL/L — SIGNIFICANT CHANGE UP (ref 22–31)
CO2 SERPL-SCNC: 29 MMOL/L — SIGNIFICANT CHANGE UP (ref 22–31)
CREAT SERPL-MCNC: 0.6 MG/DL — SIGNIFICANT CHANGE UP (ref 0.5–1.3)
CREAT SERPL-MCNC: 0.7 MG/DL — SIGNIFICANT CHANGE UP (ref 0.5–1.3)
GLUCOSE SERPL-MCNC: 144 MG/DL — HIGH (ref 70–99)
GLUCOSE SERPL-MCNC: 194 MG/DL — HIGH (ref 70–99)
MAGNESIUM SERPL-MCNC: 1.5 MG/DL — LOW (ref 1.6–2.6)
POTASSIUM SERPL-MCNC: 2.6 MMOL/L — CRITICAL LOW (ref 3.5–5.3)
POTASSIUM SERPL-MCNC: 3.2 MMOL/L — LOW (ref 3.5–5.3)
POTASSIUM SERPL-SCNC: 2.6 MMOL/L — CRITICAL LOW (ref 3.5–5.3)
POTASSIUM SERPL-SCNC: 3.2 MMOL/L — LOW (ref 3.5–5.3)
SODIUM SERPL-SCNC: 142 MMOL/L — SIGNIFICANT CHANGE UP (ref 135–145)
SODIUM SERPL-SCNC: 143 MMOL/L — SIGNIFICANT CHANGE UP (ref 135–145)

## 2017-04-30 ENCOUNTER — RESULT REVIEW (OUTPATIENT)
Age: 82
End: 2017-04-30

## 2017-05-01 ENCOUNTER — APPOINTMENT (OUTPATIENT)
Dept: INFUSION THERAPY | Facility: HOSPITAL | Age: 82
End: 2017-05-01

## 2017-05-01 ENCOUNTER — LABORATORY RESULT (OUTPATIENT)
Age: 82
End: 2017-05-01

## 2017-05-02 DIAGNOSIS — E86.0 DEHYDRATION: ICD-10-CM

## 2017-05-04 ENCOUNTER — RESULT REVIEW (OUTPATIENT)
Age: 82
End: 2017-05-04

## 2017-05-04 ENCOUNTER — LABORATORY RESULT (OUTPATIENT)
Age: 82
End: 2017-05-04

## 2017-05-04 ENCOUNTER — APPOINTMENT (OUTPATIENT)
Dept: INFUSION THERAPY | Facility: HOSPITAL | Age: 82
End: 2017-05-04

## 2017-05-04 LAB
BASOPHILS # BLD AUTO: 0 K/UL — SIGNIFICANT CHANGE UP (ref 0–0.2)
EOSINOPHIL # BLD AUTO: 0.1 K/UL — SIGNIFICANT CHANGE UP (ref 0–0.5)
EOSINOPHIL NFR BLD AUTO: 1 % — SIGNIFICANT CHANGE UP (ref 0–6)
HCT VFR BLD CALC: 25.2 % — LOW (ref 39–50)
HGB BLD-MCNC: 8.7 G/DL — LOW (ref 13–17)
LYMPHOCYTES # BLD AUTO: 0.9 K/UL — LOW (ref 1–3.3)
LYMPHOCYTES # BLD AUTO: 91 % — HIGH (ref 13–44)
MCHC RBC-ENTMCNC: 29.3 PG — SIGNIFICANT CHANGE UP (ref 27–34)
MCHC RBC-ENTMCNC: 34.6 G/DL — SIGNIFICANT CHANGE UP (ref 32–36)
MCV RBC AUTO: 84.6 FL — SIGNIFICANT CHANGE UP (ref 80–100)
MONOCYTES # BLD AUTO: 0 K/UL — SIGNIFICANT CHANGE UP (ref 0–0.9)
MONOCYTES NFR BLD AUTO: 1 % — LOW (ref 2–14)
NEUTROPHILS # BLD AUTO: 0.1 K/UL — LOW (ref 1.8–7.4)
NEUTROPHILS NFR BLD AUTO: 7 % — LOW (ref 43–77)
PLAT MORPH BLD: NORMAL — SIGNIFICANT CHANGE UP
PLATELET # BLD AUTO: 21 K/UL — LOW (ref 150–400)
PLATELET # BLD MANUAL: 18 K/UL — CRITICAL LOW (ref 150–400)
RBC # BLD: 2.98 M/UL — LOW (ref 4.2–5.8)
RBC # FLD: 12.8 % — SIGNIFICANT CHANGE UP (ref 10.3–14.5)
RBC BLD AUTO: SIGNIFICANT CHANGE UP
WBC # BLD: 1.1 K/UL — LOW (ref 3.8–10.5)
WBC # FLD AUTO: 1.1 K/UL — LOW (ref 3.8–10.5)

## 2017-05-08 ENCOUNTER — APPOINTMENT (OUTPATIENT)
Dept: INFUSION THERAPY | Facility: HOSPITAL | Age: 82
End: 2017-05-08

## 2017-05-08 ENCOUNTER — RESULT REVIEW (OUTPATIENT)
Age: 82
End: 2017-05-08

## 2017-05-08 LAB
EOSINOPHIL # BLD AUTO: 0.1 K/UL — SIGNIFICANT CHANGE UP (ref 0–0.5)
EOSINOPHIL NFR BLD AUTO: 3 % — SIGNIFICANT CHANGE UP (ref 0–6)
HCT VFR BLD CALC: 24.7 % — LOW (ref 39–50)
HGB BLD-MCNC: 8.5 G/DL — LOW (ref 13–17)
LYMPHOCYTES # BLD AUTO: 1 K/UL — SIGNIFICANT CHANGE UP (ref 1–3.3)
LYMPHOCYTES # BLD AUTO: 82 % — HIGH (ref 13–44)
MCHC RBC-ENTMCNC: 28.8 PG — SIGNIFICANT CHANGE UP (ref 27–34)
MCHC RBC-ENTMCNC: 34.5 G/DL — SIGNIFICANT CHANGE UP (ref 32–36)
MCV RBC AUTO: 83.5 FL — SIGNIFICANT CHANGE UP (ref 80–100)
MONOCYTES # BLD AUTO: 0 K/UL — SIGNIFICANT CHANGE UP (ref 0–0.9)
MONOCYTES NFR BLD AUTO: 5 % — SIGNIFICANT CHANGE UP (ref 2–14)
NEUTROPHILS # BLD AUTO: 0.1 K/UL — LOW (ref 1.8–7.4)
NEUTROPHILS NFR BLD AUTO: 10 % — LOW (ref 43–77)
PLAT MORPH BLD: NORMAL — SIGNIFICANT CHANGE UP
PLATELET # BLD AUTO: 5 K/UL — CRITICAL LOW (ref 150–400)
PLATELET # BLD MANUAL: 6 K/UL — CRITICAL LOW (ref 150–400)
RBC # BLD: 2.96 M/UL — LOW (ref 4.2–5.8)
RBC # FLD: 12.4 % — SIGNIFICANT CHANGE UP (ref 10.3–14.5)
RBC BLD AUTO: SIGNIFICANT CHANGE UP
WBC # BLD: 1.2 K/UL — LOW (ref 3.8–10.5)
WBC # FLD AUTO: 1.2 K/UL — LOW (ref 3.8–10.5)

## 2017-05-10 ENCOUNTER — OUTPATIENT (OUTPATIENT)
Dept: OUTPATIENT SERVICES | Facility: HOSPITAL | Age: 82
LOS: 1 days | Discharge: HOME | End: 2017-05-10

## 2017-05-10 DIAGNOSIS — C95.00 ACUTE LEUKEMIA OF UNSPECIFIED CELL TYPE NOT HAVING ACHIEVED REMISSION: ICD-10-CM

## 2017-05-11 LAB
ALBUMIN SERPL ELPH-MCNC: 3.6 G/DL
ALP BLD-CCNC: 109 U/L
ALT SERPL-CCNC: 23 U/L
ANION GAP SERPL CALC-SCNC: 17 MMOL/L
AST SERPL-CCNC: 17 U/L
BILIRUB SERPL-MCNC: 0.6 MG/DL
BUN SERPL-MCNC: 18 MG/DL
CALCIUM SERPL-MCNC: 10.5 MG/DL
CHLORIDE SERPL-SCNC: 101 MMOL/L
CO2 SERPL-SCNC: 28 MMOL/L
CREAT SERPL-MCNC: 0.82 MG/DL
GLUCOSE SERPL-MCNC: 125 MG/DL
LDH SERPL-CCNC: 85 U/L
POTASSIUM SERPL-SCNC: 2.8 MMOL/L
PROT SERPL-MCNC: 6.6 G/DL
SODIUM SERPL-SCNC: 146 MMOL/L
URATE SERPL-MCNC: 4.3 MG/DL

## 2017-05-12 ENCOUNTER — RESULT REVIEW (OUTPATIENT)
Age: 82
End: 2017-05-12

## 2017-05-12 ENCOUNTER — APPOINTMENT (OUTPATIENT)
Dept: INFUSION THERAPY | Facility: HOSPITAL | Age: 82
End: 2017-05-12

## 2017-05-12 ENCOUNTER — LABORATORY RESULT (OUTPATIENT)
Age: 82
End: 2017-05-12

## 2017-05-12 ENCOUNTER — APPOINTMENT (OUTPATIENT)
Dept: HEMATOLOGY ONCOLOGY | Facility: CLINIC | Age: 82
End: 2017-05-12

## 2017-05-12 VITALS
SYSTOLIC BLOOD PRESSURE: 110 MMHG | RESPIRATION RATE: 16 BRPM | BODY MASS INDEX: 24.91 KG/M2 | HEART RATE: 88 BPM | TEMPERATURE: 98 F | WEIGHT: 147.71 LBS | DIASTOLIC BLOOD PRESSURE: 64 MMHG

## 2017-05-12 DIAGNOSIS — D61.818 OTHER PANCYTOPENIA: ICD-10-CM

## 2017-05-12 DIAGNOSIS — E87.6 HYPOKALEMIA: ICD-10-CM

## 2017-05-12 DIAGNOSIS — K08.89 OTHER SPECIFIED DISORDERS OF TEETH AND SUPPORTING STRUCTURES: ICD-10-CM

## 2017-05-12 LAB
BASOPHILS # BLD AUTO: 0 K/UL — SIGNIFICANT CHANGE UP (ref 0–0.2)
BLD GP AB SCN SERPL QL: NEGATIVE — SIGNIFICANT CHANGE UP
EOSINOPHIL # BLD AUTO: 0 K/UL — SIGNIFICANT CHANGE UP (ref 0–0.5)
EOSINOPHIL NFR BLD AUTO: 2 % — SIGNIFICANT CHANGE UP (ref 0–6)
HCT VFR BLD CALC: 22 % — LOW (ref 39–50)
HGB BLD-MCNC: 8 G/DL — LOW (ref 13–17)
LYMPHOCYTES # BLD AUTO: 0.6 K/UL — LOW (ref 1–3.3)
LYMPHOCYTES # BLD AUTO: 80 % — HIGH (ref 13–44)
MCHC RBC-ENTMCNC: 30.3 PG — SIGNIFICANT CHANGE UP (ref 27–34)
MCHC RBC-ENTMCNC: 36.3 G/DL — HIGH (ref 32–36)
MCV RBC AUTO: 83.4 FL — SIGNIFICANT CHANGE UP (ref 80–100)
MONOCYTES # BLD AUTO: 0 K/UL — SIGNIFICANT CHANGE UP (ref 0–0.9)
NEUTROPHILS # BLD AUTO: 0.2 K/UL — LOW (ref 1.8–7.4)
NEUTROPHILS NFR BLD AUTO: 18 % — LOW (ref 43–77)
PLAT MORPH BLD: NORMAL — SIGNIFICANT CHANGE UP
PLATELET # BLD AUTO: 3 K/UL — CRITICAL LOW (ref 150–400)
PLATELET # BLD MANUAL: 3 K/UL — CRITICAL LOW (ref 150–400)
RBC # BLD: 2.63 M/UL — LOW (ref 4.2–5.8)
RBC # FLD: 12.5 % — SIGNIFICANT CHANGE UP (ref 10.3–14.5)
RBC BLD AUTO: SIGNIFICANT CHANGE UP
RH IG SCN BLD-IMP: POSITIVE — SIGNIFICANT CHANGE UP
WBC # BLD: 0.9 K/UL — CRITICAL LOW (ref 3.8–10.5)
WBC # FLD AUTO: 0.9 K/UL — CRITICAL LOW (ref 3.8–10.5)

## 2017-05-12 RX ORDER — OXYCODONE 5 MG/1
5 TABLET ORAL
Qty: 20 | Refills: 0 | Status: ACTIVE | COMMUNITY
Start: 2017-03-02

## 2017-05-13 PROCEDURE — 86901 BLOOD TYPING SEROLOGIC RH(D): CPT

## 2017-05-13 PROCEDURE — 86850 RBC ANTIBODY SCREEN: CPT

## 2017-05-13 PROCEDURE — 86900 BLOOD TYPING SEROLOGIC ABO: CPT

## 2017-05-13 PROCEDURE — P9011: CPT

## 2017-05-13 PROCEDURE — 86923 COMPATIBILITY TEST ELECTRIC: CPT

## 2017-05-15 ENCOUNTER — RESULT REVIEW (OUTPATIENT)
Age: 82
End: 2017-05-15

## 2017-05-15 ENCOUNTER — APPOINTMENT (OUTPATIENT)
Dept: INFUSION THERAPY | Facility: HOSPITAL | Age: 82
End: 2017-05-15

## 2017-05-15 DIAGNOSIS — R11.2 NAUSEA WITH VOMITING, UNSPECIFIED: ICD-10-CM

## 2017-05-15 DIAGNOSIS — Z51.89 ENCOUNTER FOR OTHER SPECIFIED AFTERCARE: ICD-10-CM

## 2017-05-15 PROBLEM — E87.6 HYPOKALEMIA: Status: ACTIVE | Noted: 2017-05-15

## 2017-05-15 LAB
BASOPHILS # BLD AUTO: 0 K/UL — SIGNIFICANT CHANGE UP (ref 0–0.2)
BASOPHILS NFR BLD AUTO: 1 % — SIGNIFICANT CHANGE UP (ref 0–2)
EOSINOPHIL # BLD AUTO: 0 K/UL — SIGNIFICANT CHANGE UP (ref 0–0.5)
HCT VFR BLD CALC: 19.9 % — CRITICAL LOW (ref 39–50)
HGB BLD-MCNC: 7.2 G/DL — LOW (ref 13–17)
LYMPHOCYTES # BLD AUTO: 0.7 K/UL — LOW (ref 1–3.3)
LYMPHOCYTES # BLD AUTO: 74 % — HIGH (ref 13–44)
MCHC RBC-ENTMCNC: 30 PG — SIGNIFICANT CHANGE UP (ref 27–34)
MCHC RBC-ENTMCNC: 35.9 G/DL — SIGNIFICANT CHANGE UP (ref 32–36)
MCV RBC AUTO: 83.6 FL — SIGNIFICANT CHANGE UP (ref 80–100)
MONOCYTES # BLD AUTO: 0 K/UL — SIGNIFICANT CHANGE UP (ref 0–0.9)
MONOCYTES NFR BLD AUTO: 1 % — LOW (ref 2–14)
NEUTROPHILS # BLD AUTO: 0.2 K/UL — LOW (ref 1.8–7.4)
NEUTROPHILS NFR BLD AUTO: 23 % — LOW (ref 43–77)
NEUTS BAND # BLD: 1 % — SIGNIFICANT CHANGE UP (ref 0–8)
PLAT MORPH BLD: NORMAL — SIGNIFICANT CHANGE UP
PLATELET # BLD AUTO: 4 K/UL — CRITICAL LOW (ref 150–400)
RBC # BLD: 2.39 M/UL — LOW (ref 4.2–5.8)
RBC # FLD: 12.5 % — SIGNIFICANT CHANGE UP (ref 10.3–14.5)
RBC BLD AUTO: SIGNIFICANT CHANGE UP
WBC # BLD: 0.9 K/UL — CRITICAL LOW (ref 3.8–10.5)
WBC # FLD AUTO: 0.9 K/UL — CRITICAL LOW (ref 3.8–10.5)

## 2017-05-15 RX ORDER — POTASSIUM CHLORIDE 1500 MG/1
20 TABLET, EXTENDED RELEASE ORAL DAILY
Qty: 60 | Refills: 2 | Status: ACTIVE | COMMUNITY
Start: 2017-04-27 | End: 1900-01-01

## 2017-05-18 ENCOUNTER — RESULT REVIEW (OUTPATIENT)
Age: 82
End: 2017-05-18

## 2017-05-18 ENCOUNTER — APPOINTMENT (OUTPATIENT)
Dept: INFUSION THERAPY | Facility: HOSPITAL | Age: 82
End: 2017-05-18

## 2017-05-18 LAB
EOSINOPHIL # BLD AUTO: 0 K/UL — SIGNIFICANT CHANGE UP (ref 0–0.5)
EOSINOPHIL NFR BLD AUTO: 2 % — SIGNIFICANT CHANGE UP (ref 0–6)
HCT VFR BLD CALC: 25.9 % — LOW (ref 39–50)
HGB BLD-MCNC: 9.2 G/DL — LOW (ref 13–17)
LYMPHOCYTES # BLD AUTO: 0.8 K/UL — LOW (ref 1–3.3)
LYMPHOCYTES # BLD AUTO: 73 % — HIGH (ref 13–44)
MCHC RBC-ENTMCNC: 30.2 PG — SIGNIFICANT CHANGE UP (ref 27–34)
MCHC RBC-ENTMCNC: 35.7 G/DL — SIGNIFICANT CHANGE UP (ref 32–36)
MCV RBC AUTO: 84.8 FL — SIGNIFICANT CHANGE UP (ref 80–100)
MONOCYTES # BLD AUTO: 0 K/UL — SIGNIFICANT CHANGE UP (ref 0–0.9)
MONOCYTES NFR BLD AUTO: 8 % — SIGNIFICANT CHANGE UP (ref 2–14)
NEUTROPHILS # BLD AUTO: 0.2 K/UL — LOW (ref 1.8–7.4)
NEUTROPHILS NFR BLD AUTO: 17 % — LOW (ref 43–77)
PLAT MORPH BLD: NORMAL — SIGNIFICANT CHANGE UP
PLATELET # BLD AUTO: 4 K/UL — CRITICAL LOW (ref 150–400)
PLATELET # BLD MANUAL: 26 K/UL — LOW (ref 150–400)
RBC # BLD: 3.06 M/UL — LOW (ref 4.2–5.8)
RBC # FLD: 13.2 % — SIGNIFICANT CHANGE UP (ref 10.3–14.5)
RBC BLD AUTO: SIGNIFICANT CHANGE UP
WBC # BLD: 1.1 K/UL — LOW (ref 3.8–10.5)
WBC # FLD AUTO: 1.1 K/UL — LOW (ref 3.8–10.5)

## 2017-05-22 ENCOUNTER — RESULT REVIEW (OUTPATIENT)
Age: 82
End: 2017-05-22

## 2017-05-22 ENCOUNTER — APPOINTMENT (OUTPATIENT)
Dept: HEMATOLOGY ONCOLOGY | Facility: CLINIC | Age: 82
End: 2017-05-22

## 2017-05-22 ENCOUNTER — APPOINTMENT (OUTPATIENT)
Dept: INFUSION THERAPY | Facility: HOSPITAL | Age: 82
End: 2017-05-22

## 2017-05-22 LAB
BLASTS # FLD: 1 % — HIGH (ref 0–0)
EOSINOPHIL # BLD AUTO: 0.1 K/UL — SIGNIFICANT CHANGE UP (ref 0–0.5)
EOSINOPHIL NFR BLD AUTO: 1 % — SIGNIFICANT CHANGE UP (ref 0–6)
HCT VFR BLD CALC: 24.7 % — LOW (ref 39–50)
HGB BLD-MCNC: 8.9 G/DL — LOW (ref 13–17)
LYMPHOCYTES # BLD AUTO: 0.6 K/UL — LOW (ref 1–3.3)
LYMPHOCYTES # BLD AUTO: 68 % — HIGH (ref 13–44)
MCHC RBC-ENTMCNC: 30.6 PG — SIGNIFICANT CHANGE UP (ref 27–34)
MCHC RBC-ENTMCNC: 36 G/DL — SIGNIFICANT CHANGE UP (ref 32–36)
MCV RBC AUTO: 85.1 FL — SIGNIFICANT CHANGE UP (ref 80–100)
MONOCYTES # BLD AUTO: 0 K/UL — SIGNIFICANT CHANGE UP (ref 0–0.9)
MONOCYTES NFR BLD AUTO: 3 % — SIGNIFICANT CHANGE UP (ref 2–14)
NEUTROPHILS # BLD AUTO: 0.2 K/UL — LOW (ref 1.8–7.4)
NEUTROPHILS NFR BLD AUTO: 27 % — LOW (ref 43–77)
PLAT MORPH BLD: NORMAL — SIGNIFICANT CHANGE UP
PLATELET # BLD AUTO: 6 K/UL — CRITICAL LOW (ref 150–400)
PLATELET # BLD MANUAL: 6 K/UL — CRITICAL LOW (ref 150–400)
RBC # BLD: 2.91 M/UL — LOW (ref 4.2–5.8)
RBC # FLD: 13 % — SIGNIFICANT CHANGE UP (ref 10.3–14.5)
RBC BLD AUTO: SIGNIFICANT CHANGE UP
WBC # BLD: 1 K/UL — CRITICAL LOW (ref 3.8–10.5)
WBC # FLD AUTO: 1 K/UL — CRITICAL LOW (ref 3.8–10.5)

## 2017-05-23 ENCOUNTER — OUTPATIENT (OUTPATIENT)
Dept: OUTPATIENT SERVICES | Facility: HOSPITAL | Age: 82
LOS: 1 days | End: 2017-05-23
Payer: MEDICARE

## 2017-05-23 DIAGNOSIS — C95.00 ACUTE LEUKEMIA OF UNSPECIFIED CELL TYPE NOT HAVING ACHIEVED REMISSION: ICD-10-CM

## 2017-05-25 ENCOUNTER — RESULT REVIEW (OUTPATIENT)
Age: 82
End: 2017-05-25

## 2017-05-25 ENCOUNTER — APPOINTMENT (OUTPATIENT)
Dept: INFUSION THERAPY | Facility: HOSPITAL | Age: 82
End: 2017-05-25

## 2017-05-25 LAB
BLASTS # FLD: 1 % — HIGH (ref 0–0)
EOSINOPHIL # BLD AUTO: 0.1 K/UL — SIGNIFICANT CHANGE UP (ref 0–0.5)
EOSINOPHIL NFR BLD AUTO: 3 % — SIGNIFICANT CHANGE UP (ref 0–6)
HCT VFR BLD CALC: 23.6 % — LOW (ref 39–50)
HGB BLD-MCNC: 8.2 G/DL — LOW (ref 13–17)
LYMPHOCYTES # BLD AUTO: 0.6 K/UL — LOW (ref 1–3.3)
LYMPHOCYTES # BLD AUTO: 66 % — HIGH (ref 13–44)
MCHC RBC-ENTMCNC: 29.8 PG — SIGNIFICANT CHANGE UP (ref 27–34)
MCHC RBC-ENTMCNC: 34.9 G/DL — SIGNIFICANT CHANGE UP (ref 32–36)
MCV RBC AUTO: 85.4 FL — SIGNIFICANT CHANGE UP (ref 80–100)
MONOCYTES # BLD AUTO: 0 K/UL — SIGNIFICANT CHANGE UP (ref 0–0.9)
MONOCYTES NFR BLD AUTO: 5 % — SIGNIFICANT CHANGE UP (ref 2–14)
NEUTROPHILS # BLD AUTO: 0.2 K/UL — LOW (ref 1.8–7.4)
NEUTROPHILS NFR BLD AUTO: 25 % — LOW (ref 43–77)
PLAT MORPH BLD: NORMAL — SIGNIFICANT CHANGE UP
PLATELET # BLD AUTO: 7 K/UL — CRITICAL LOW (ref 150–400)
PLATELET # BLD MANUAL: 23 K/UL — LOW (ref 150–400)
RBC # BLD: 2.76 M/UL — LOW (ref 4.2–5.8)
RBC # FLD: 13.2 % — SIGNIFICANT CHANGE UP (ref 10.3–14.5)
RBC BLD AUTO: SIGNIFICANT CHANGE UP
WBC # BLD: 1 K/UL — CRITICAL LOW (ref 3.8–10.5)
WBC # FLD AUTO: 1 K/UL — CRITICAL LOW (ref 3.8–10.5)

## 2017-05-27 ENCOUNTER — APPOINTMENT (OUTPATIENT)
Dept: HEMATOLOGY ONCOLOGY | Facility: CLINIC | Age: 82
End: 2017-05-27

## 2017-05-27 ENCOUNTER — RESULT REVIEW (OUTPATIENT)
Age: 82
End: 2017-05-27

## 2017-05-27 LAB
BLD GP AB SCN SERPL QL: NEGATIVE — SIGNIFICANT CHANGE UP
RH IG SCN BLD-IMP: POSITIVE — SIGNIFICANT CHANGE UP

## 2017-05-30 ENCOUNTER — RESULT REVIEW (OUTPATIENT)
Age: 82
End: 2017-05-30

## 2017-05-30 ENCOUNTER — APPOINTMENT (OUTPATIENT)
Dept: INFUSION THERAPY | Facility: HOSPITAL | Age: 82
End: 2017-05-30

## 2017-05-30 LAB
BASOPHILS # BLD AUTO: 0 K/UL — SIGNIFICANT CHANGE UP (ref 0–0.2)
BLASTS # FLD: 2 % — HIGH (ref 0–0)
EOSINOPHIL # BLD AUTO: 0 K/UL — SIGNIFICANT CHANGE UP (ref 0–0.5)
EOSINOPHIL NFR BLD AUTO: 1 % — SIGNIFICANT CHANGE UP (ref 0–6)
HCT VFR BLD CALC: 19.8 % — CRITICAL LOW (ref 39–50)
HGB BLD-MCNC: 6.9 G/DL — CRITICAL LOW (ref 13–17)
LYMPHOCYTES # BLD AUTO: 0.5 K/UL — LOW (ref 1–3.3)
LYMPHOCYTES # BLD AUTO: 56 % — HIGH (ref 13–44)
MCHC RBC-ENTMCNC: 30.2 PG — SIGNIFICANT CHANGE UP (ref 27–34)
MCHC RBC-ENTMCNC: 35 G/DL — SIGNIFICANT CHANGE UP (ref 32–36)
MCV RBC AUTO: 86.4 FL — SIGNIFICANT CHANGE UP (ref 80–100)
MONOCYTES # BLD AUTO: 0 K/UL — SIGNIFICANT CHANGE UP (ref 0–0.9)
MONOCYTES NFR BLD AUTO: 2 % — SIGNIFICANT CHANGE UP (ref 2–14)
NEUTROPHILS # BLD AUTO: 0.3 K/UL — LOW (ref 1.8–7.4)
NEUTROPHILS NFR BLD AUTO: 39 % — LOW (ref 43–77)
PLAT MORPH BLD: NORMAL — SIGNIFICANT CHANGE UP
PLATELET # BLD AUTO: 4 K/UL — CRITICAL LOW (ref 150–400)
PLATELET # BLD MANUAL: 3 K/UL — CRITICAL LOW (ref 150–400)
RBC # BLD: 2.29 M/UL — LOW (ref 4.2–5.8)
RBC # FLD: 13 % — SIGNIFICANT CHANGE UP (ref 10.3–14.5)
RBC BLD AUTO: SIGNIFICANT CHANGE UP
WBC # BLD: 0.8 K/UL — CRITICAL LOW (ref 3.8–10.5)
WBC # FLD AUTO: 0.8 K/UL — CRITICAL LOW (ref 3.8–10.5)

## 2017-05-31 ENCOUNTER — APPOINTMENT (OUTPATIENT)
Dept: INFUSION THERAPY | Facility: HOSPITAL | Age: 82
End: 2017-05-31

## 2017-05-31 ENCOUNTER — RESULT REVIEW (OUTPATIENT)
Age: 82
End: 2017-05-31

## 2017-05-31 LAB — PLATELET # BLD MANUAL: 34 K/UL — LOW (ref 150–400)

## 2017-06-02 ENCOUNTER — APPOINTMENT (OUTPATIENT)
Dept: INFUSION THERAPY | Facility: HOSPITAL | Age: 82
End: 2017-06-02

## 2017-06-02 ENCOUNTER — RX RENEWAL (OUTPATIENT)
Age: 82
End: 2017-06-02

## 2017-06-05 ENCOUNTER — APPOINTMENT (OUTPATIENT)
Dept: INFUSION THERAPY | Facility: HOSPITAL | Age: 82
End: 2017-06-05

## 2017-06-05 ENCOUNTER — RESULT REVIEW (OUTPATIENT)
Age: 82
End: 2017-06-05

## 2017-06-05 LAB
BASOPHILS # BLD AUTO: 0 K/UL — SIGNIFICANT CHANGE UP (ref 0–0.2)
BLASTS # FLD: 5 % — HIGH (ref 0–0)
BLD GP AB SCN SERPL QL: NEGATIVE — SIGNIFICANT CHANGE UP
EOSINOPHIL # BLD AUTO: 0 K/UL — SIGNIFICANT CHANGE UP (ref 0–0.5)
EOSINOPHIL NFR BLD AUTO: 2 % — SIGNIFICANT CHANGE UP (ref 0–6)
HCT VFR BLD CALC: 24.1 % — LOW (ref 39–50)
HGB BLD-MCNC: 8.3 G/DL — LOW (ref 13–17)
LYMPHOCYTES # BLD AUTO: 1.1 K/UL — SIGNIFICANT CHANGE UP (ref 1–3.3)
LYMPHOCYTES # BLD AUTO: 52 % — HIGH (ref 13–44)
MCHC RBC-ENTMCNC: 29.8 PG — SIGNIFICANT CHANGE UP (ref 27–34)
MCHC RBC-ENTMCNC: 34.5 G/DL — SIGNIFICANT CHANGE UP (ref 32–36)
MCV RBC AUTO: 86.4 FL — SIGNIFICANT CHANGE UP (ref 80–100)
MONOCYTES # BLD AUTO: 0 K/UL — SIGNIFICANT CHANGE UP (ref 0–0.9)
MONOCYTES NFR BLD AUTO: 2 % — SIGNIFICANT CHANGE UP (ref 2–14)
NEUTROPHILS # BLD AUTO: 0.7 K/UL — LOW (ref 1.8–7.4)
NEUTROPHILS NFR BLD AUTO: 37 % — LOW (ref 43–77)
NEUTS BAND # BLD: 2 % — SIGNIFICANT CHANGE UP (ref 0–8)
PLAT MORPH BLD: NORMAL — SIGNIFICANT CHANGE UP
PLATELET # BLD AUTO: 9 K/UL — CRITICAL LOW (ref 150–400)
PLATELET # BLD MANUAL: 8 K/UL — CRITICAL LOW (ref 150–400)
RBC # BLD: 2.79 M/UL — LOW (ref 4.2–5.8)
RBC # FLD: 13.3 % — SIGNIFICANT CHANGE UP (ref 10.3–14.5)
RBC BLD AUTO: SIGNIFICANT CHANGE UP
RH IG SCN BLD-IMP: POSITIVE — SIGNIFICANT CHANGE UP
WBC # BLD: 1.8 K/UL — LOW (ref 3.8–10.5)
WBC # FLD AUTO: 1.8 K/UL — LOW (ref 3.8–10.5)

## 2017-06-07 PROCEDURE — 86850 RBC ANTIBODY SCREEN: CPT

## 2017-06-07 PROCEDURE — 86901 BLOOD TYPING SEROLOGIC RH(D): CPT

## 2017-06-07 PROCEDURE — 86923 COMPATIBILITY TEST ELECTRIC: CPT

## 2017-06-07 PROCEDURE — 86900 BLOOD TYPING SEROLOGIC ABO: CPT

## 2017-06-08 ENCOUNTER — RESULT REVIEW (OUTPATIENT)
Age: 82
End: 2017-06-08

## 2017-06-08 ENCOUNTER — APPOINTMENT (OUTPATIENT)
Dept: INFUSION THERAPY | Facility: HOSPITAL | Age: 82
End: 2017-06-08

## 2017-06-08 LAB
BLASTS # FLD: 10 % — HIGH (ref 0–0)
EOSINOPHIL NFR BLD AUTO: 2 % — SIGNIFICANT CHANGE UP (ref 0–6)
HCT VFR BLD CALC: 24.6 % — LOW (ref 39–50)
HGB BLD-MCNC: 8.6 G/DL — LOW (ref 13–17)
LYMPHOCYTES # BLD AUTO: 58 % — HIGH (ref 13–44)
LYMPHOCYTES # BLD AUTO: SIGNIFICANT CHANGE UP (ref 1–3.3)
MCHC RBC-ENTMCNC: 30.3 PG — SIGNIFICANT CHANGE UP (ref 27–34)
MCHC RBC-ENTMCNC: 34.9 G/DL — SIGNIFICANT CHANGE UP (ref 32–36)
MCV RBC AUTO: 86.8 FL — SIGNIFICANT CHANGE UP (ref 80–100)
NEUTROPHILS # BLD AUTO: SIGNIFICANT CHANGE UP (ref 1.8–7.4)
NEUTROPHILS NFR BLD AUTO: 28 % — LOW (ref 43–77)
NEUTS BAND # BLD: 2 % — SIGNIFICANT CHANGE UP (ref 0–8)
PLAT MORPH BLD: NORMAL — SIGNIFICANT CHANGE UP
PLATELET # BLD AUTO: 4 K/UL — CRITICAL LOW (ref 150–400)
RBC # BLD: 2.83 M/UL — LOW (ref 4.2–5.8)
RBC # FLD: 13.5 % — SIGNIFICANT CHANGE UP (ref 10.3–14.5)
RBC BLD AUTO: SIGNIFICANT CHANGE UP
WBC # BLD: 1.5 K/UL — LOW (ref 3.8–10.5)
WBC # FLD AUTO: 1.5 K/UL — LOW (ref 3.8–10.5)

## 2017-06-09 ENCOUNTER — OUTPATIENT (OUTPATIENT)
Dept: OUTPATIENT SERVICES | Facility: HOSPITAL | Age: 82
LOS: 1 days | Discharge: HOME | End: 2017-06-09

## 2017-06-09 DIAGNOSIS — C95.00 ACUTE LEUKEMIA OF UNSPECIFIED CELL TYPE NOT HAVING ACHIEVED REMISSION: ICD-10-CM

## 2017-06-12 ENCOUNTER — RESULT REVIEW (OUTPATIENT)
Age: 82
End: 2017-06-12

## 2017-06-12 ENCOUNTER — APPOINTMENT (OUTPATIENT)
Dept: HEMATOLOGY ONCOLOGY | Facility: CLINIC | Age: 82
End: 2017-06-12

## 2017-06-12 ENCOUNTER — APPOINTMENT (OUTPATIENT)
Dept: INFUSION THERAPY | Facility: HOSPITAL | Age: 82
End: 2017-06-12

## 2017-06-12 VITALS
RESPIRATION RATE: 16 BRPM | SYSTOLIC BLOOD PRESSURE: 109 MMHG | WEIGHT: 140.21 LBS | BODY MASS INDEX: 23.65 KG/M2 | OXYGEN SATURATION: 98 % | TEMPERATURE: 97.9 F | HEART RATE: 104 BPM | DIASTOLIC BLOOD PRESSURE: 80 MMHG

## 2017-06-12 DIAGNOSIS — D61.818 OTHER PANCYTOPENIA: ICD-10-CM

## 2017-06-12 DIAGNOSIS — C92.00 ACUTE MYELOBLASTIC LEUKEMIA, NOT HAVING ACHIEVED REMISSION: ICD-10-CM

## 2017-06-12 LAB
BASOPHILS NFR BLD AUTO: 2 % — SIGNIFICANT CHANGE UP (ref 0–2)
BLASTS # FLD: 4 % — HIGH (ref 0–0)
EOSINOPHIL NFR BLD AUTO: 2 % — SIGNIFICANT CHANGE UP (ref 0–6)
HCT VFR BLD CALC: 27.3 % — LOW (ref 39–50)
HGB BLD-MCNC: 9.6 G/DL — LOW (ref 13–17)
LYMPHOCYTES # BLD AUTO: 58 % — HIGH (ref 13–44)
LYMPHOCYTES # BLD AUTO: SIGNIFICANT CHANGE UP (ref 1–3.3)
MCHC RBC-ENTMCNC: 31 PG — SIGNIFICANT CHANGE UP (ref 27–34)
MCHC RBC-ENTMCNC: 35.3 G/DL — SIGNIFICANT CHANGE UP (ref 32–36)
MCV RBC AUTO: 87.8 FL — SIGNIFICANT CHANGE UP (ref 80–100)
NEUTROPHILS # BLD AUTO: SIGNIFICANT CHANGE UP (ref 1.8–7.4)
NEUTROPHILS NFR BLD AUTO: 28 % — LOW (ref 43–77)
NEUTS BAND # BLD: 6 % — SIGNIFICANT CHANGE UP (ref 0–8)
PLAT MORPH BLD: NORMAL — SIGNIFICANT CHANGE UP
PLATELET # BLD AUTO: 3 K/UL — CRITICAL LOW (ref 150–400)
PLATELET # BLD MANUAL: 35 K/UL — LOW (ref 150–400)
RBC # BLD: 3.11 M/UL — LOW (ref 4.2–5.8)
RBC # FLD: 13.2 % — SIGNIFICANT CHANGE UP (ref 10.3–14.5)
RBC BLD AUTO: SIGNIFICANT CHANGE UP
WBC # BLD: 1.2 K/UL — LOW (ref 3.8–10.5)
WBC # FLD AUTO: 1.2 K/UL — LOW (ref 3.8–10.5)

## 2017-06-12 RX ORDER — ELTROMBOPAG OLAMINE 50 MG/1
50 TABLET, FILM COATED ORAL DAILY
Qty: 90 | Refills: 1 | Status: DISCONTINUED | COMMUNITY
Start: 2017-03-27 | End: 2017-06-12

## 2017-06-13 LAB
ALBUMIN SERPL ELPH-MCNC: 3.5 G/DL
ALP BLD-CCNC: 89 U/L
ALT SERPL-CCNC: 15 U/L
ANION GAP SERPL CALC-SCNC: 17 MMOL/L
AST SERPL-CCNC: 16 U/L
BILIRUB SERPL-MCNC: 1.4 MG/DL
BUN SERPL-MCNC: 31 MG/DL
CALCIUM SERPL-MCNC: 10.4 MG/DL
CHLORIDE SERPL-SCNC: 101 MMOL/L
CO2 SERPL-SCNC: 25 MMOL/L
CREAT SERPL-MCNC: 1.16 MG/DL
GLUCOSE SERPL-MCNC: 163 MG/DL
LDH SERPL-CCNC: 93 U/L
POTASSIUM SERPL-SCNC: 2.9 MMOL/L
PROT SERPL-MCNC: 6 G/DL
SODIUM SERPL-SCNC: 143 MMOL/L
URATE SERPL-MCNC: 5.1 MG/DL

## 2017-06-15 ENCOUNTER — EMERGENCY (EMERGENCY)
Facility: HOSPITAL | Age: 82
LOS: 1 days | Discharge: HOME | End: 2017-06-15
Attending: EMERGENCY MEDICINE | Admitting: EMERGENCY MEDICINE
Payer: MEDICARE

## 2017-06-15 ENCOUNTER — APPOINTMENT (OUTPATIENT)
Dept: INFUSION THERAPY | Facility: HOSPITAL | Age: 82
End: 2017-06-15

## 2017-06-15 VITALS
RESPIRATION RATE: 18 BRPM | HEIGHT: 64 IN | HEART RATE: 109 BPM | SYSTOLIC BLOOD PRESSURE: 99 MMHG | WEIGHT: 139.99 LBS | OXYGEN SATURATION: 100 % | TEMPERATURE: 99 F | DIASTOLIC BLOOD PRESSURE: 63 MMHG

## 2017-06-15 VITALS
HEART RATE: 82 BPM | TEMPERATURE: 98 F | OXYGEN SATURATION: 96 % | RESPIRATION RATE: 18 BRPM | SYSTOLIC BLOOD PRESSURE: 118 MMHG | DIASTOLIC BLOOD PRESSURE: 52 MMHG

## 2017-06-15 DIAGNOSIS — Z51.89 ENCOUNTER FOR OTHER SPECIFIED AFTERCARE: ICD-10-CM

## 2017-06-15 DIAGNOSIS — Z51.11 ENCOUNTER FOR ANTINEOPLASTIC CHEMOTHERAPY: ICD-10-CM

## 2017-06-15 LAB
ALBUMIN SERPL ELPH-MCNC: 3.4 G/DL — SIGNIFICANT CHANGE UP (ref 3.3–5)
ALP SERPL-CCNC: 94 U/L — SIGNIFICANT CHANGE UP (ref 40–120)
ALT FLD-CCNC: 15 U/L RC — SIGNIFICANT CHANGE UP (ref 10–45)
ANION GAP SERPL CALC-SCNC: 13 MMOL/L — SIGNIFICANT CHANGE UP (ref 5–17)
APTT BLD: 32 SEC — SIGNIFICANT CHANGE UP (ref 27.5–37.4)
AST SERPL-CCNC: 11 U/L — SIGNIFICANT CHANGE UP (ref 10–40)
BASOPHILS # BLD AUTO: 0 K/UL — SIGNIFICANT CHANGE UP (ref 0–0.2)
BASOPHILS NFR BLD AUTO: 0.6 % — SIGNIFICANT CHANGE UP (ref 0–2)
BILIRUB SERPL-MCNC: 1.3 MG/DL — HIGH (ref 0.2–1.2)
BLD GP AB SCN SERPL QL: NEGATIVE — SIGNIFICANT CHANGE UP
BUN SERPL-MCNC: 26 MG/DL — HIGH (ref 7–23)
CALCIUM SERPL-MCNC: 10.9 MG/DL — HIGH (ref 8.4–10.5)
CHLORIDE SERPL-SCNC: 103 MMOL/L — SIGNIFICANT CHANGE UP (ref 96–108)
CO2 SERPL-SCNC: 28 MMOL/L — SIGNIFICANT CHANGE UP (ref 22–31)
CREAT SERPL-MCNC: 1.02 MG/DL — SIGNIFICANT CHANGE UP (ref 0.5–1.3)
EOSINOPHIL # BLD AUTO: 0 K/UL — SIGNIFICANT CHANGE UP (ref 0–0.5)
EOSINOPHIL NFR BLD AUTO: 0.1 % — SIGNIFICANT CHANGE UP (ref 0–6)
GLUCOSE SERPL-MCNC: 165 MG/DL — HIGH (ref 70–99)
HCT VFR BLD CALC: 28.8 % — LOW (ref 39–50)
HGB BLD-MCNC: 9.9 G/DL — LOW (ref 13–17)
INR BLD: 1.54 RATIO — HIGH (ref 0.88–1.16)
LYMPHOCYTES # BLD AUTO: 0.7 K/UL — LOW (ref 1–3.3)
LYMPHOCYTES # BLD AUTO: 57.9 % — HIGH (ref 13–44)
MCHC RBC-ENTMCNC: 31 PG — SIGNIFICANT CHANGE UP (ref 27–34)
MCHC RBC-ENTMCNC: 34.5 GM/DL — SIGNIFICANT CHANGE UP (ref 32–36)
MCV RBC AUTO: 90 FL — SIGNIFICANT CHANGE UP (ref 80–100)
NEUTROPHILS # BLD AUTO: 0.5 K/UL — LOW (ref 1.8–7.4)
NEUTROPHILS NFR BLD AUTO: 41.4 % — LOW (ref 43–77)
PLATELET # BLD AUTO: 4 K/UL — CRITICAL LOW (ref 150–400)
POTASSIUM SERPL-MCNC: 3.4 MMOL/L — LOW (ref 3.5–5.3)
POTASSIUM SERPL-SCNC: 3.4 MMOL/L — LOW (ref 3.5–5.3)
PROT SERPL-MCNC: 6.8 G/DL — SIGNIFICANT CHANGE UP (ref 6–8.3)
PROTHROM AB SERPL-ACNC: 16.8 SEC — HIGH (ref 9.8–12.7)
RBC # BLD: 3.2 M/UL — LOW (ref 4.2–5.8)
RBC # FLD: 13.1 % — SIGNIFICANT CHANGE UP (ref 10.3–14.5)
RH IG SCN BLD-IMP: POSITIVE — SIGNIFICANT CHANGE UP
SODIUM SERPL-SCNC: 144 MMOL/L — SIGNIFICANT CHANGE UP (ref 135–145)
WBC # BLD: 1.2 K/UL — LOW (ref 3.8–10.5)
WBC # FLD AUTO: 1.2 K/UL — LOW (ref 3.8–10.5)

## 2017-06-15 PROCEDURE — P9037: CPT

## 2017-06-15 PROCEDURE — 12011 RPR F/E/E/N/L/M 2.5 CM/<: CPT | Mod: GC

## 2017-06-15 PROCEDURE — 85610 PROTHROMBIN TIME: CPT

## 2017-06-15 PROCEDURE — 96374 THER/PROPH/DIAG INJ IV PUSH: CPT | Mod: XU

## 2017-06-15 PROCEDURE — 12011 RPR F/E/E/N/L/M 2.5 CM/<: CPT

## 2017-06-15 PROCEDURE — 36430 TRANSFUSION BLD/BLD COMPNT: CPT | Mod: XU

## 2017-06-15 PROCEDURE — 85730 THROMBOPLASTIN TIME PARTIAL: CPT

## 2017-06-15 PROCEDURE — 86850 RBC ANTIBODY SCREEN: CPT

## 2017-06-15 PROCEDURE — 85027 COMPLETE CBC AUTOMATED: CPT

## 2017-06-15 PROCEDURE — 80053 COMPREHEN METABOLIC PANEL: CPT

## 2017-06-15 PROCEDURE — 99284 EMERGENCY DEPT VISIT MOD MDM: CPT | Mod: 25,GC

## 2017-06-15 PROCEDURE — 86900 BLOOD TYPING SEROLOGIC ABO: CPT

## 2017-06-15 PROCEDURE — 70450 CT HEAD/BRAIN W/O DYE: CPT | Mod: 26

## 2017-06-15 PROCEDURE — 99285 EMERGENCY DEPT VISIT HI MDM: CPT | Mod: 25

## 2017-06-15 PROCEDURE — 70450 CT HEAD/BRAIN W/O DYE: CPT

## 2017-06-15 PROCEDURE — 86901 BLOOD TYPING SEROLOGIC RH(D): CPT

## 2017-06-15 RX ORDER — LORATADINE 10 MG/1
10 TABLET ORAL DAILY
Qty: 0 | Refills: 0 | Status: DISCONTINUED | OUTPATIENT
Start: 2017-06-15 | End: 2017-06-19

## 2017-06-15 RX ORDER — ACETAMINOPHEN 500 MG
650 TABLET ORAL ONCE
Qty: 0 | Refills: 0 | Status: COMPLETED | OUTPATIENT
Start: 2017-06-15 | End: 2017-06-15

## 2017-06-15 RX ORDER — HYDROCORTISONE 20 MG
100 TABLET ORAL ONCE
Qty: 0 | Refills: 0 | Status: COMPLETED | OUTPATIENT
Start: 2017-06-15 | End: 2017-06-15

## 2017-06-15 RX ADMIN — Medication 100 MILLIGRAM(S): at 14:55

## 2017-06-15 RX ADMIN — LORATADINE 10 MILLIGRAM(S): 10 TABLET ORAL at 14:55

## 2017-06-15 RX ADMIN — Medication 650 MILLIGRAM(S): at 14:55

## 2017-06-15 NOTE — ED ADULT NURSE REASSESSMENT NOTE - NS ED NURSE REASSESS COMMENT FT1
signed platelet transfusion consent in chart. Pt. and family at bedside educated and verbalized understanding of transfusion related reactions. platelets verified and administered with 2nd RN at bedside. signed platelet transfusion consent in chart. Pt. and family at bedside educated and verbalized understanding of transfusion related reactions. platelets verified and administered with 2nd RN at bedside. Vitals as documented on paper  transfusion record.

## 2017-06-15 NOTE — ED PROVIDER NOTE - ATTENDING CONTRIBUTION TO CARE
I have seen and evaluated this patient with the resident.   I agree with the findings  unless other wise stated.  I have made appropriate changes in documentations where needed, After my face to face bedside evaluation, I am further  noting: Will get CTH due to hx of low platelets and head injury, send type and screen for possible transfusion. Will repair laceration, discuss with oncologist. as no signs of acute intracranial bleed will d/c will have f/u outpatient

## 2017-06-15 NOTE — ED PROVIDER NOTE - OBJECTIVE STATEMENT
84 YOM pmh HTN, AML, low platelets presents to ED s/p mechanical fall pt hit his head while walking out of bathroom, pt misstepped and fell into wall hit head and then slowly slided down the wall onto the ground, pt injured right forehead. Denies LOC, denies confusion, denies weakness in body. Pt has been able to ambulate, no SOB, no CP.    Sacha: Eden Mcfadden

## 2017-06-15 NOTE — ED PROVIDER NOTE - MEDICAL DECISION MAKING DETAILS
Will get CTH due to hx of low platelets and head injury, send type and screen for possible transfusion. Will repair laceration, discuss with oncologist.

## 2017-06-15 NOTE — ED PROVIDER NOTE - PROGRESS NOTE DETAILS
Discussed with oncologist, will transfuse unit of platelets and d/c home with follow up if cth normal No ICH on CTH, transfusing unit of platelets, lac repaired, will d/c home.

## 2017-06-19 ENCOUNTER — RESULT REVIEW (OUTPATIENT)
Age: 82
End: 2017-06-19

## 2017-06-19 ENCOUNTER — APPOINTMENT (OUTPATIENT)
Dept: INFUSION THERAPY | Facility: HOSPITAL | Age: 82
End: 2017-06-19

## 2017-06-19 LAB
BASOPHILS # BLD AUTO: 0 K/UL — SIGNIFICANT CHANGE UP (ref 0–0.2)
BLASTS # FLD: 6 % — HIGH (ref 0–0)
EOSINOPHIL # BLD AUTO: 0.1 K/UL — SIGNIFICANT CHANGE UP (ref 0–0.5)
EOSINOPHIL NFR BLD AUTO: 1 % — SIGNIFICANT CHANGE UP (ref 0–6)
HCT VFR BLD CALC: 22.3 % — LOW (ref 39–50)
HGB BLD-MCNC: 8 G/DL — LOW (ref 13–17)
LYMPHOCYTES # BLD AUTO: 1.7 K/UL — SIGNIFICANT CHANGE UP (ref 1–3.3)
LYMPHOCYTES # BLD AUTO: 71 % — HIGH (ref 13–44)
MCHC RBC-ENTMCNC: 31.2 PG — SIGNIFICANT CHANGE UP (ref 27–34)
MCHC RBC-ENTMCNC: 35.7 G/DL — SIGNIFICANT CHANGE UP (ref 32–36)
MCV RBC AUTO: 87.7 FL — SIGNIFICANT CHANGE UP (ref 80–100)
MONOCYTES # BLD AUTO: 0 K/UL — SIGNIFICANT CHANGE UP (ref 0–0.9)
MONOCYTES NFR BLD AUTO: 1 % — LOW (ref 2–14)
NEUTROPHILS # BLD AUTO: 0.5 K/UL — LOW (ref 1.8–7.4)
NEUTROPHILS NFR BLD AUTO: 21 % — LOW (ref 43–77)
PLAT MORPH BLD: NORMAL — SIGNIFICANT CHANGE UP
PLATELET # BLD AUTO: 13 K/UL — CRITICAL LOW (ref 150–400)
PLATELET # BLD MANUAL: 37 K/UL — LOW (ref 150–400)
RBC # BLD: 2.55 M/UL — LOW (ref 4.2–5.8)
RBC # FLD: 12.7 % — SIGNIFICANT CHANGE UP (ref 10.3–14.5)
RBC BLD AUTO: SIGNIFICANT CHANGE UP
WBC # BLD: 2.2 K/UL — LOW (ref 3.8–10.5)
WBC # FLD AUTO: 2.2 K/UL — LOW (ref 3.8–10.5)

## 2017-06-22 ENCOUNTER — APPOINTMENT (OUTPATIENT)
Dept: INFUSION THERAPY | Facility: HOSPITAL | Age: 82
End: 2017-06-22

## 2017-06-26 ENCOUNTER — APPOINTMENT (OUTPATIENT)
Dept: HEMATOLOGY ONCOLOGY | Facility: CLINIC | Age: 82
End: 2017-06-26

## 2017-06-26 ENCOUNTER — APPOINTMENT (OUTPATIENT)
Dept: INFUSION THERAPY | Facility: HOSPITAL | Age: 82
End: 2017-06-26

## 2017-06-29 ENCOUNTER — APPOINTMENT (OUTPATIENT)
Dept: INFUSION THERAPY | Facility: HOSPITAL | Age: 82
End: 2017-06-29

## 2017-07-03 ENCOUNTER — APPOINTMENT (OUTPATIENT)
Dept: INFUSION THERAPY | Facility: HOSPITAL | Age: 82
End: 2017-07-03

## 2017-07-06 ENCOUNTER — APPOINTMENT (OUTPATIENT)
Dept: INFUSION THERAPY | Facility: HOSPITAL | Age: 82
End: 2017-07-06

## 2017-07-10 ENCOUNTER — APPOINTMENT (OUTPATIENT)
Dept: INFUSION THERAPY | Facility: HOSPITAL | Age: 82
End: 2017-07-10

## 2017-07-13 ENCOUNTER — APPOINTMENT (OUTPATIENT)
Dept: INFUSION THERAPY | Facility: HOSPITAL | Age: 82
End: 2017-07-13

## 2017-07-17 ENCOUNTER — APPOINTMENT (OUTPATIENT)
Dept: HEMATOLOGY ONCOLOGY | Facility: CLINIC | Age: 82
End: 2017-07-17

## 2017-07-24 ENCOUNTER — APPOINTMENT (OUTPATIENT)
Dept: HEMATOLOGY ONCOLOGY | Facility: CLINIC | Age: 82
End: 2017-07-24

## 2018-01-23 NOTE — H&P ADULT. - CRANIAL NERVE
Verified Results  (1) PT WITH INR [id number] 09:12AM Taye Mccoyeller     Test Name Result Flag Reference   INR 2 17 H 0 86-1 16   PT 24 4 seconds H 12 1-14 4 CN II - XII Grossly intact.

## 2018-02-16 NOTE — DISCHARGE NOTE ADULT - ADDITIONAL INSTRUCTIONS
From: Naty Ross  To: Cassy Avalos MD  Sent: 2/16/2018 7:55 AM CST  Subject: signature?    When I was at my appointment yesterday I forgot to ask about getting a signature for my disabled parking renewal notice. Normally my hip doctor would sign my renewal but due to changing insurance I am in between hip doctors right now. Would it be possible to get a signature on my renewal notice?   Continue with  Prophylactic Levaquin after completion of Cipro.

## 2019-06-17 NOTE — PATIENT PROFILE ADULT. - NS PRO ABUSE SCREEN AFRAID ANYONE YN
Pediatric Otolaryngology and Facial Plastics Post Tympanostomy Tube    CC: Follow up ear tubes    Date of Service: 06/17/19      Dear Dr. Quiroz,    I had the pleasure of seeing Shaan Georges today in follow up.     HPI:  Shaan is a 17 month old male who presents for follow up after ear tubes. Tubes were placed for Recurrent Acute Otitis Media- Bilateral. No post operative infections. Hearing improved. No concerns today.     Past Surgical History:   Procedure Laterality Date     MYRINGOTOMY, INSERT TUBE BILATERAL, COMBINED Bilateral 2/26/2019    Procedure: Bilateral Myringotomy with Pressure Equalizer  Tube Placement;  Surgeon: Aline Du MD;  Location: [facility] OR       No past medical history on file.        REVIEW OF SYSTEMS:  12 point ROS obtained and was negative other than the symptoms noted above in the HPI.    PHYSICAL EXAMINATION:  General: No acute distress, age appropriate behavior  Wt 25 lb 3 oz (11.4 kg)   HEAD: normocephalic, atraumatic  Face: symmetrical, no swelling, edema, or erythema, no facial droop  Eyes: EOMI, PERRLA    Ears:   Bilateral external ears normal with patent external ear canals bilaterally.   Right EAC:Normal caliber with minimal cerumen  Right TM: Tube in place and patent  Right middle ear:No effusion    Left EAC:Normal caliber with minimal cerumen  Left TM:Tube in place and patent  Left middle ear:No effusion    Nose:   No anterior drainage, intact and midline septum without perforation or hematoma   Mouth: Moist, no ulcers, no jaw or tooth tenderness, tongue midline and symmetric.    Oropharynx:   Tonsils: 2+  Palate intact with normal movement  Uvula singular and midline, no oropharyngeal erythema  Neck: no LAD, trach midline  Neuro: cranial nerves 2-12 grossly intact    Post Operative Audiogram: Normal thresholds bilaterally. Tympanograms show open tubes bilaterally.     Impressions and Recommendations:  Shaan is a 17 month old male who presents for follow up after  ear tubes. Tubes are in and open and post operative audiogram is normal. Recommend yearly evaluations to assess the tubes and hearing. Will see Shaan back in our clinic in 1 year.     Thank you for allowing me to participate in the care of Shaan. Please don't hesitate to contact me.    Juanjo Varela MD  Pediatric Otolaryngology and Facial Plastics  Department of Otolaryngology  [facility]   Clinic [phone]   Pager [phone]   [email or web address]     no

## 2019-10-25 NOTE — PHYSICAL THERAPY INITIAL EVALUATION ADULT - ASSISTIVE DEVICE FOR TRANSFER: GAIT, REHAB EVAL
1.  If the pravastatin 20mg bottle is not , you can take two nightly until gone. If they are  take the 40mg dose as one tab nightly instead. 2.  Please follow the following instructions to process/authorize your referral, if needed:    Referrals processing  Please verify with your insurance IF you need referral authorization submitted. For insurance plans which require this, please follow the following steps. FAILURE TO DO SO MAY RESULT IN INABILITY TO SEE THE SPECIALIST YOU HAVE BEEN REFERRED TO (once you are scheduled to see them). 1. Call and schedule appointment with specialist  2. Call our clinic and leave message with provider name, and date of appointment  3. We will then submit the referral to your insurance. This process takes 2-5 business days. If you have questions about scheduling or authorizing referral, you can review with our referral coordinator Elodia Rhodes). You can review with her today if available/if you have time, or you can call to review once you have made your referral/appointment. If you are not sure if you need referral authorizations, please review with the referral coordinator(s), either prior to or after you have made the appointment, as reviewed. straight cane/rolling walker

## 2020-10-07 NOTE — PHYSICAL THERAPY INITIAL EVALUATION ADULT - PATIENT PROFILE REVIEW, REHAB EVAL
[facility] Medicine Services  INPATIENT PROGRESS NOTE    Length of Stay: 13  Date of Admission: 9/23/2020  Primary Care Physician: Wendie Quiñonez APRN    Subjective   Chief Complaint: No new complaints.    HPI: Patient is seen for follow-up.  He is doing well, less deconditioned and voices no new complaints.  Oral intake continues to improve and patient seems cheerful this a.m.       Review of Systems   Constitutional: Positive for activity change and fatigue. Negative for appetite change, chills, diaphoresis and fever.   HENT: Negative for trouble swallowing and voice change.    Eyes: Positive for visual disturbance. Negative for photophobia.   Respiratory: Negative for cough, choking, chest tightness, shortness of breath, wheezing and stridor.    Cardiovascular: Negative for chest pain, palpitations and leg swelling.   Gastrointestinal: Negative for abdominal distention, abdominal pain, blood in stool, constipation, diarrhea, nausea and vomiting.   Endocrine: Negative for cold intolerance, heat intolerance, polydipsia, polyphagia and polyuria.   Genitourinary: Negative for decreased urine volume, difficulty urinating, dysuria, enuresis, flank pain, frequency, hematuria and urgency.   Musculoskeletal: Negative for arthralgias, gait problem, myalgias, neck pain and neck stiffness.   Skin: Negative for pallor, rash and wound.   Neurological: Negative for dizziness, tremors, seizures, syncope, facial asymmetry, speech difficulty, weakness, light-headedness, numbness and headaches.   Hematological: Does not bruise/bleed easily.   Psychiatric/Behavioral: Negative for agitation, behavioral problems and confusion.       Objective    Temp:  [96.1 °F (35.6 °C)-98.1 °F (36.7 °C)] 98.1 °F (36.7 °C)  Heart Rate:  [58-82] 67  Resp:  [16-18] 16  BP: (121-128)/(56-68) 122/58    Physical Exam  Vitals signs and nursing note reviewed.   Constitutional:       General: He is not in acute  yes distress.     Appearance: He is well-developed. He is not diaphoretic.   HENT:      Head: Normocephalic and atraumatic.   Eyes:      General: No scleral icterus.     Pupils: Pupils are equal, round, and reactive to light.      Comments: Patient is legally blind.   Neck:      Musculoskeletal: Normal range of motion and neck supple.      Thyroid: No thyromegaly.      Vascular: No JVD.   Cardiovascular:      Rate and Rhythm: Normal rate and regular rhythm.      Heart sounds: Normal heart sounds. No murmur. No friction rub. No gallop.    Pulmonary:      Effort: Pulmonary effort is normal.      Breath sounds: Normal breath sounds. No wheezing or rales.   Chest:      Chest wall: No tenderness.   Abdominal:      General: Bowel sounds are normal. There is no distension.      Palpations: Abdomen is soft. There is no mass.      Tenderness: There is no abdominal tenderness. There is no guarding or rebound.   Musculoskeletal:         General: No tenderness or deformity.      Right lower leg: No edema.      Left lower leg: No edema.   Skin:     General: Skin is warm and dry.      Coloration: Skin is not pale.      Findings: No erythema or rash.   Neurological:      General: No focal deficit present.      Mental Status: He is alert and oriented to person, place, and time. Mental status is at baseline.      Cranial Nerves: No cranial nerve deficit.      Sensory: No sensory deficit.      Motor: No abnormal muscle tone.      Coordination: Coordination normal.      Deep Tendon Reflexes: Reflexes normal.   Psychiatric:         Behavior: Behavior normal.         Thought Content: Thought content normal.         Judgment: Judgment normal.           Medication Review:    Current Facility-Administered Medications:   •  acetaminophen (TYLENOL) tablet 650 mg, 650 mg, Oral, Q6H PRN, Samira Rodrigues MD, 650 mg at 09/25/20 0007  •  amiodarone (PACERONE) tablet 200 mg, 200 mg, Oral, Q24H, Molly Gr APRN, 200 mg  at 10/07/20 0914  •  aspirin EC tablet 81 mg, 81 mg, Oral, Daily, Samira Rodrigues MD, 81 mg at 10/07/20 0916  •  atorvastatin (LIPITOR) tablet 40 mg, 40 mg, Oral, Nightly, Samira Rodrigues MD, 40 mg at 10/06/20 2209  •  bisacodyl (DULCOLAX) EC tablet 5 mg, 5 mg, Oral, Daily PRN, Samira Rodrigues MD  •  carvedilol (COREG) tablet 6.25 mg, 6.25 mg, Oral, BID With Meals, Pedro Garcia MD, 6.25 mg at 10/07/20 0912  •  dronabinol (MARINOL) capsule 2.5 mg, 2.5 mg, Oral, BID, Pedro Garcia MD, 2.5 mg at 10/07/20 1003  •  furosemide (LASIX) tablet 40 mg, 40 mg, Oral, Daily, Marley Akhtar MD, 40 mg at 10/07/20 0913  •  hydrALAZINE (APRESOLINE) tablet 75 mg, 75 mg, Oral, Q8H, Pedro Garcia MD, 75 mg at 10/06/20 2208  •  ipratropium-albuterol (DUO-NEB) nebulizer solution 3 mL, 3 mL, Nebulization, Q4H PRN, Samira Rodrigues MD  •  isosorbide mononitrate (IMDUR) 24 hr tablet 30 mg, 30 mg, Oral, Q24H, Samira Rodrigues MD, 30 mg at 10/07/20 0915  •  magic butt ointment, , Topical, BID, Samira Rodrigues MD  •  metOLazone (ZAROXOLYN) tablet 2.5 mg, 2.5 mg, Oral, Every Other Day, Marley Akhtar MD, 2.5 mg at 10/06/20 0918  •  ondansetron (ZOFRAN) tablet 4 mg, 4 mg, Oral, Q6H PRN **OR** ondansetron (ZOFRAN) injection 4 mg, 4 mg, Intravenous, Q6H PRN, Samira Rodrigues MD, 4 mg at 10/01/20 1824  •  pantoprazole (PROTONIX) EC tablet 40 mg, 40 mg, Oral, QAM, Samira Rodrigues MD, 40 mg at 10/05/20 0605  •  Pharmacy to dose warfarin, , Does not apply, Continuous PRN, Samira Rodrigues MD  •  sodium chloride 0.9 % flush 10 mL, 10 mL, Intravenous, PRN, Tree Waggoner, DO  •  sodium chloride 0.9 % flush 10 mL, 10 mL, Intravenous, Q12H, Samira Rodrigues MD, 10 mL at 10/06/20 0914  •  sodium chloride 0.9 %  flush 10 mL, 10 mL, Intravenous, PRN, Samira Rodrigues MD  •  warfarin (COUMADIN) tablet 5 mg, 5 mg, Oral, Daily, Samira Rodrigues MD    Results Review:  I have reviewed the labs, radiology results, and diagnostic studies.    Laboratory Data:   Results from last 7 days   Lab Units 10/07/20  0610 10/06/20  0635 10/05/20  0511   SODIUM mmol/L 136 137 138   POTASSIUM mmol/L 3.9 3.2* 3.8   CHLORIDE mmol/L 94* 93* 94*   CO2 mmol/L 28.0 33.0* 35.0*   BUN mg/dL 76* 67* 60*   CREATININE mg/dL 3.71* 3.39* 3.31*   GLUCOSE mg/dL 103* 121* 110*   CALCIUM mg/dL 8.8 9.3 9.5   ANION GAP mmol/L 14.0 11.0 9.0     Estimated Creatinine Clearance: 18.8 mL/min (A) (by C-G formula based on SCr of 3.71 mg/dL (H)).          Results from last 7 days   Lab Units 10/07/20  0610 10/03/20  0603   WBC 10*3/mm3 7.02 7.64   HEMOGLOBIN g/dL 10.3* 11.9*   HEMATOCRIT % 31.3* 35.4*   PLATELETS 10*3/mm3 160 162     Results from last 7 days   Lab Units 10/07/20  0610 10/06/20  0635 10/05/20  0511 10/04/20  0851 10/03/20  0744   INR  1.40* 1.26* 1.27* 1.60* 1.70*       Culture Data:   No results found for: BLOODCX  No results found for: URINECX  No results found for: RESPCX  No results found for: WOUNDCX  No results found for: STOOLCX  No components found for: BODYFLD    Radiology Data:   Imaging Results (Last 24 Hours)     ** No results found for the last 24 hours. **          I have reviewed the patient's current medications.     Assessment/Plan     Hospital Problem List:  Active Problems:    Pneumonia of both lower lobes due to infectious organism    Acute respiratory failure with hypoxia secondary to Pneumonia/ CHF decompensation: Resolved.  Patient is maintaining O2 saturation in the upper 90s on room air and has completed a course of antimicrobial therapy. Blood culture showed no growth.    Heart failure with decreased ejection fraction decompensation (secondary to ischemic cardiomyopathy): Resolved.   Follow-up chest x-ray done 10/3/2020 was essentially unremarkable. Echocardiogram done 8/21/2020 showed severe global hypokinesis with an ejection fraction of 20%.  Diuretic therapy has been changed to Lasix 40 mg daily and metolazone every other day due to worsening creatinine.  Continue daily weights, salt and fluid restriction. Inputs by nephrologist/cardiologist is appreciated.     Chronic kidney disease stage III/4: Patient's baseline creatinine is reportedly between 1.9-2.0. Creatinine is 3.71 today.  Patient did receive fluid bolus of 500 cc of saline on 10/5/2020. Will defer further adjustment of ongoing diuretic therapy to the nephrologist.  He may need additional IV infusion/ albumin if deemed necessary by the nephrologist.  Continue to monitor renal function and avoid nephrotoxins.  Input by nephrologist is appreciated.    Hypokalemia: Resolved.      History of atrial flutter: Patient is currently in normal sinus rhythm.  Continue amiodarone, Coreg and anticoagulation with warfarin.  INR is 1.40 and pharmacy is dosing warfarin.  Patient reportedly has not been compliant with taking prescribed warfarin hence the low INR.      Hypertension: Stable.  Continue Coreg and hydralazine.      Diabetes mellitus: Stable.  Continue anti-glycemic with Accu-Cheks and sliding scale insulin.    Acute cystitis: He has completed a course of antimicrobial therapy.  Repeat urine culture showed no growth.            History of GERD: Continue PPI.    Continue DVT prophylaxis with warfarin.    Nutrition: Continue appetite stimulants and diet as recommended by speech-language pathologist.     Deconditioning: Continue PT and OT.    Discharge Planning: In progress.      Pedro Garcia MD   10/07/20   10:55 CDT

## 2021-02-18 NOTE — PROVIDER CONTACT NOTE (OTHER) - REASON
elevated BP
pt. hypertensive 192/80
platelet transfusion complete, do you want stat CBC?
Island Pedicle Flap-Requiring Vessel Identification Text: The defect edges were debeveled with a #15 scalpel blade.  Given the location of the defect, shape of the defect and the proximity to free margins an island pedicle advancement flap was deemed most appropriate.  Using a sterile surgical marker, an appropriate advancement flap was drawn, based on the axial vessel mentioned above, incorporating the defect, outlining the appropriate donor tissue and placing the expected incisions within the relaxed skin tension lines where possible.    The area thus outlined was incised deep to adipose tissue with a #15 scalpel blade.  The skin margins were undermined to an appropriate distance in all directions around the primary defect and laterally outward around the island pedicle utilizing iris scissors.  There was minimal undermining beneath the pedicle flap.

## 2021-03-10 NOTE — PROVIDER CONTACT NOTE (OTHER) - ACTION/TREATMENT ORDERED:
[facility]    History and Physical - Pediatric Gastroenterology Service        Date of Admission:  3/10/2021    Assessment & Plan   Jeramie Wright is a 7 month old male admitted on 3/10/2021. He has a history of Trisomy 21, duodenal atresia s/p repair, hypothyroidism, ASD, VSD, portal hypertension secondary to portal vein thrombosis and liver failure and is admitted for post-operative management after gastrostomy tube placement and circumcision.     FEN  - NPO until tomorrow AM, plan to discuss with Surgery to determine feeding plan  - D5NS at 25ml/hr while NPO  - Continue PTA vitamin D3 10 mcg/mL daily, multivitamin CF Formula 0.5ml PO daily    GI    S/P Gastrostomy tube placement  - G tube to gravity today  - NPO x 24 hrs (medications OK)   - MIVF with D5 NS   - Will restart home feeding after ok from surgery.    History of liver cirrhosis  History of portal hypertension  History of ascites  History of portosystemic shunt (stented ductus venosus)  - Daily weights, strict Is/Os  - Continue PTA aspirin 20.25mg PO daily  - Continue PTA Lasix 1.5 mg PO BID  - Continue PTA propranolol 0.8mg PO TID  - Continue PTA spironolactone 4.5 mg per feeding tube Q8h  - Continue PTA ursodiol 46 mg PO BID  - If eating, omeprazole 2.5 mg PO every morning before breakfast  - To complete a 24 hr course of antibiotics (Cefoxitin) per Dr. Kerr.  - AM Labs ordered: CBC, CMP, GGT and direct bilirubin per Dr. Lamas.    Endocrine  Hypothyroidism   - Continue PTA levothyroxine 25 mcg PO daily  - Due for repeat TSH and Free T4.labs to be obtained in the AM.    Genitourinary   Circumcision (3/10/2021)  - bacitracin ointment topical Q1h PRN    Respiratory  History of hepatopulmonary syndrome  - Supplemental O2 via nasal cannula, currently on 1/2L, wean as tolerated (baseline 1/16L at home)    Neuro:  - Pain control:   Mild: Tylenol PRN  Moderate to severe: Morphine 0.3 mg Q3h PRN  - PRN 
Naloxone in place.     Diet: NPO for Medical/Clinical Reasons Except for: Meds  Fluids: D5NS at 25ml/hr  DVT Prophylaxis: Low Risk/Ambulatory with no VTE prophylaxis indicated  [place] Catheter: not present  Code Status:         Disposition Plan   Expected discharge: 2 - 3 days, recommended to home once tolerating G tube feeds.  Entered: Emilie Garrett 03/10/2021, 4:07 PM       The patient's care was discussed with the Attending Physician, Dr. Kerr.    Emilie Garrett, MS3  [employer]  [facility]    Resident Attestation  I, Konstantin Mcmahon MD, was present with the medical student who participated in the service and in the documentation of the note.  I have verified the history and personally performed the physical exam and medical decision making.  I agree with the assessment and plan of care as documented in the note.      Konstantin Mcmahon MD  [facility]   ______________________________________________________________________    Chief Complaint   Gastrostomy Tube Placement    History is obtained from the patient's parent and the electronic medical record.     History of Present Illness   Jeramie Wright is a 7 month old male with a history of Trisomy 21, duodenal atresia s/p repair, ASD, VSD, and liver failure and is admitted for management after gastrostomy tube placement and circumcision. The patient's mother reports that the patient is doing overall well since surgery but has some fussiness likely due to pain. She denies any bowel movements or urination since surgery. She denies any bleeding at either incision site.     The patient's mother is requesting that upcoming outpatient Endocrine and Gastroenterology labs be drawn while in the hospital if possible. The patient's mother reports that the patient is typically able to consume 60% of his daily feeds orally but that some days, he does not tolerate oral 
feeds at all. See feeding plan from his dietician below.    Daytime feeds:   1. Mix 2 scoops Pregestimil powder + 100 mL  water = Makes ~115 mL of 24 Kcal/oz formula  2. Mix 108 mL of Pregestimil 24 (as mixed in step 1) + 1.5 mL MCT oil = ~100 mL.  Give 4x/day.  3. Enzyme dosin Creon 3000 or 2 Creon 6000 (total of 98599) given orally with applesauce prior to each feeding to provide 2448 units lipase/gm fat.   Overnight feed:   1. Mix 6 scoops Pregestimil powder + 9 oz (270 mL) water = Makes ~310 mL of 26 Kcal/oz formula.    2. Run at 33 mL/hr x 8 hours (add to bag every 4 hours). Total of 264 mL overnight.  3. Enzyme dosing: Use 8 Creon 3000 or 4 Creon 6000 (total of 56462) with overnight feeds. Instructions given to Mom to open capsules and grind/crush in a  and add to formula.  This will provide 1846 units lipase/gm fat.  Feeds to provide 704 mL (125 mL/kg), 610 Kcal (108 Kcal/kg), 16.5 gm protein (2.9 gm/kg).      Review of Systems    CONSTITUTIONAL:  negative  RESPIRATORY:  negative  CARDIOVASCULAR:  negative  GASTROINTESTINAL: abdominal pain, tenderness at incision, no bleeding, no bowel movements  GENITOURINARY: no urination, no bleeding from circumcision site    Past Medical History    I have reviewed this patient's medical history and updated it with pertinent information if needed.   Past Medical History:   Diagnosis Date     ASD (atrial septal defect) 2020     Cholestatic jaundice 2020     Congenital hypothyroidism 2020     Duodenal atresia 2020    s/p repair on 2020     History of blood transfusion 8/10/20     Hypertension 20     Maternal drug use complicating pregnancy in third trimester, antepartum      Portal hypertension (H) 2020      infant 2020     Trisomy 21 2020     Past Surgical History   I have reviewed this patient's surgical history and updated it with pertinent information if needed.  Past Surgical History: 
  Procedure Laterality Date     ANESTHESIA OUT OF OR MRI  2020    Procedure: Anesthesia out of OR MRI;  Surgeon: GENERIC ANESTHESIA PROVIDER;  Location: [facility] OR     BIOPSY  9/10/20    Liver     CV PEDS HEART CATHETERIZATION N/A 2020    Procedure: Heart Catheterization, angiography, pulmonary hypertension study, possible stent placement in ductus venosus;  Surgeon: Fracisco Thrasher MD;  Location: [facility] HEART PEDS CARDIAC CATH LAB     INSERT PICC LINE INFANT Left 2020    Procedure: PICC Line placement;  Surgeon: Fitz Melton MD;  Location: [facility] OR     INSERT PICC LINE INFANT Right 2020    Procedure: INSERTION, PICC, INFANT;  Surgeon: Susan Cox MD;  Location: [facility] OR     IR LIVER BIOPSY PERCUTANEOUS  2020     IR PARACENTESIS  2020     IR PARACENTESIS  2020     IR PICC PLACEMENT < 5 YRS OF AGE  2020     IR PICC PLACEMENT > 5 YRS OF AGE  2020     LAPAROTOMY EXPLORATORY  2020      REPAIR DUODENAL ATRESIA  2020     PARACENTESIS  2020     PARACENTESIS N/A 2020    Procedure: PARACENTESIS;  Surgeon: Fitz Melton MD;  [facility] OR     PARACENTESIS Right 2020    Procedure: Paracentesis;  Surgeon: Shadi Breen MD;  [facility]: [facility] OR     PERCUTANEOUS BIOPSY LIVER N/A 2020    Procedure: NEEDLE BIOPSY, LIVER, PERCUTANEOUS;  Surgeon: Shadi Breen MD;  Location: [facility] OR     VASCULAR SURGERY  20    Stent placed in patent ductuous venousus      Social History   I have reviewed the following Social History Narrative:      Jeramie lives with mom and dad (adoptive parents) as well as 3 siblings. He does not attend .     Immunizations   Immunization Status:  up to date and documented    Family History   Patient is adopted    Prior to Admission Medications   Prior to Admission Medications   Prescriptions Last Dose Informant Patient Reported? Taking?   Multiple Vitamin (DEKAS ESSENTIAL) LIQD 3/9/2021 at 0900  No 
As per Dr. Cote pt does not need acyclovir, colace and doxazosin upon discharge.
OK to give pt Tylenol as requested for pain that is 7/10.
Provider will d/c [facility]
Yes   Sig: Take 0.5 mLs by mouth daily   amylase-lipase-protease (CREON 6) 9769-11050-73307 units CPEP 3/9/2021 at 2000  Yes Yes   Sig: Give by NG tube 1 capsule 4x/day with bolus feeds and 4 capsules for overnight feeds (1.5 capsules with each of the 2x 4hr feeds)   aspirin (ASA) 81 MG chewable tablet 3/6/2021  No No   Sig: Take 0.25 tablets (20.25 mg) by mouth daily   butt paste ointment   No No   Sig: Nystatin 15g/stomahesive 28.3g/Aquafor 60g   cholecalciferol (D-VI-SOL, VITAMIN D3) 10 mcg/mL (400 units/mL) LIQD liquid 3/9/2021 at 0900  No Yes   Sig: Take 1 mL (10 mcg) by mouth daily   furosemide (LASIX) 10 MG/ML solution 3/9/2021 at 1930  No Yes   Sig: Take 0.15 mLs (1.5 mg) by mouth 2 times daily   levothyroxine (SYNTHROID/LEVOTHROID) 25 MCG tablet 3/9/2021 at 0900  No Yes   Sig: Take 1 tablet (25 mcg) by mouth daily   omeprazole (PRILOSEC) 2 mg/mL suspension 3/9/2021 at 0900  No Yes   Sig: Take 1.25 mLs (2.5 mg) by mouth every morning (before breakfast)   propranolol (INDERAL) 20 MG/5ML solution 3/9/2021 at 1930  No Yes   Sig: Take 0.2 mLs (0.8 mg) by mouth 3 times daily   spironolactone (CAROSPIR) 25 MG/5ML SUSP suspension 3/9/2021 at 1930  No Yes   Si.9 mLs (4.5 mg) by Per Feeding Tube route every 8 hours   spironolactone POWD powder   No No   Sig: Compounded medication patient has been receiving in 25 mg/mL.  Give 4.5 mgs (0.18 mLs) every 8 hours  Dispense 20 mLs.   ursodiol (ACTIGALL) 20 mg/mL suspension 3/9/2021 at 1930  No Yes   Sig: Take 2.3 mLs (46 mg) by mouth 2 times daily      Facility-Administered Medications: None     Allergies   No Known Allergies    Physical Exam   Vital Signs: Temp: 98.3  F (36.8  C) Temp src: Axillary BP: 93/68 Pulse: 127   Resp: (!) 32(fussy ) SpO2: 100 % O2 Device: Nasal cannula Oxygen Delivery: 1/2 LPM  Weight: 14 lbs .87 oz    GENERAL: Active, alert, in mild distress.  SKIN: Central abdominal incision, clean, dry and intact. Otherwise no concerning lesions.  HEAD: 
MD aware. No new action at this time.
Normocephalic. Flat nasal bridge. Normal fontanels and sutures. Nasal cannula in place.  EYES: Conjunctivae and cornea normal. Up-slanting, almond-shaped eyes with prominent epicanthal folds.  MOUTH/THROAT: Clear. No oral lesions.  NECK: Supple.  LUNGS: Clear. No rales, rhonchi, wheezing or retractions  HEART: Auscultation difficult secondary to intermittent crying during exam. Regular rhythm. Possible murmur at LUSB.  ABDOMEN: Tender near incision. Mildly distended but soft, no masses or hepatosplenomegaly.  GENITALIA: Normal male external genitalia. Circumcision without bleeding, Plastibell ring in place.    EXTREMITIES: Symmetric extremities, no deformities  NEUROLOGIC: Mild hypotonia throughout.     Data   Data reviewed today: I reviewed all medications, new labs and imaging results over the last 24 hours. I personally reviewed no images or EKG's today.    
Provider will order hydralazine 10mg po x 1
no treatment now as per MD  will continue to monitor.
transfused platelets and 1 unit PRBC then post cbc
will come to unit to assess pt.
MD aware and states to give Tylenol and reassess pain. MD states to recheck BP in 4 hours. Will continue to monitor pt closely.
MD aware-will recheck VS in 4hours
cxr and blood cultures x 2 sent

## 2021-05-11 NOTE — ED PROVIDER NOTE - MUSCULOSKELETAL, MLM
Ok I thought they were only good for 6 months.   Thanks Spine appears normal, range of motion is not limited, no muscle or joint tenderness. pelvis stable, b/l FROM hips

## 2022-04-04 NOTE — PATIENT PROFILE ADULT. - PATIENT REPRESENTATIVE: ( YOU CAN CHOOSE ANY PERSON THAT CAN ASSIST YOU WITH YOUR HEALTH CARE PREFERENCES, DOES NOT HAVE TO BE A SPOUSE, IMMEDIATE FAMILY OR SIGNIFICANT OTHER/PARTNER)
Chief Complaint:    Chief Complaint   Patient presents with    Follow-up       History of Present Illness:  Patient with a hx of HLD, HTN, s/p prostatectomy (09/24/2009), DM2, and diverticulosis of colon presents today for a six month follow-up      Doing well.   Did diabetic eye exam.   Due for shingles vaccination.   A1C is 6.6, up from 5.8; says his blood sugar levels have been normal.   PSA is 0.2, had been stable at 0.1; no radiation after prostatectomy.   Having trouble with full ROM of his R shoulder.          ROS:  Review of Systems   Constitutional: Negative for activity change, chills, fatigue, fever and unexpected weight change.   HENT: Negative for congestion, ear discharge, ear pain, hearing loss, postnasal drip and rhinorrhea.    Eyes: Negative for pain and visual disturbance.   Respiratory: Negative for cough, chest tightness and shortness of breath.    Cardiovascular: Negative for chest pain and palpitations.   Gastrointestinal: Negative for abdominal pain, diarrhea and vomiting.   Endocrine: Negative for heat intolerance.   Genitourinary: Negative for dysuria, flank pain, frequency and hematuria.   Musculoskeletal: Positive for arthralgias and joint swelling. Negative for back pain, gait problem and neck pain.   Skin: Negative for color change and rash.   Neurological: Negative for dizziness, tremors, seizures, numbness and headaches.   Psychiatric/Behavioral: Negative for agitation, hallucinations, self-injury, sleep disturbance and suicidal ideas. The patient is not nervous/anxious.    All other systems reviewed and are negative.      Past Medical History:   Diagnosis Date    Colon polyp     colonoscopy 3/12/2014    Diabetes mellitus     Diabetes mellitus type II     Diverticulosis     colonoscopy 3/12/2014    History of prostatectomy (09/24/2009) 8/13/2012    Hyperlipidemia     Hypertension     Prostate cancer     Special screening for malignant neoplasms, colon 3/12/2014    Type 2  "diabetes mellitus        Social History:  Social History     Socioeconomic History    Marital status:    Tobacco Use    Smoking status: Never Smoker    Smokeless tobacco: Never Used   Substance and Sexual Activity    Alcohol use: Yes     Alcohol/week: 7.0 standard drinks     Types: 1 Glasses of wine, 6 Cans of beer per week     Comment: occasionally    Drug use: No    Sexual activity: Yes     Partners: Female       Family History:   family history includes Aneurysm in his father; Cirrhosis in his paternal grandmother; Diabetes in his maternal aunt and mother; Heart disease in his brother, father, mother, and paternal uncle; Hypertension in his brother, brother, and mother; Kidney disease in his brother; Stroke in his maternal grandfather and maternal grandmother; Sudden death in his father.    Health Maintenance   Topic Date Due    Eye Exam  02/22/2022    Hemoglobin A1c  09/28/2022    Lipid Panel  03/28/2023    Low Dose Statin  04/04/2023    Foot Exam  04/04/2023    TETANUS VACCINE  05/15/2023    Hepatitis C Screening  Completed       Physical Exam:    Vital Signs  Temp: 98.1 °F (36.7 °C)  Pulse: 100  SpO2: (!) 94 %  BP: 114/73  Height and Weight  Height: 5' 11" (180.3 cm)  Weight: 73 kg (160 lb 15 oz)  BSA (Calculated - sq m): 1.91 sq meters  BMI (Calculated): 22.5  Weight in (lb) to have BMI = 25: 178.9]    Body mass index is 22.45 kg/m².    Physical Exam  Vitals and nursing note reviewed.   Constitutional:       Appearance: Normal appearance.   HENT:      Head: Normocephalic and atraumatic.      Right Ear: Tympanic membrane normal.      Left Ear: Tympanic membrane normal.   Eyes:      Extraocular Movements: Extraocular movements intact.      Pupils: Pupils are equal, round, and reactive to light.   Cardiovascular:      Rate and Rhythm: Normal rate and regular rhythm.      Pulses: Normal pulses.           Dorsalis pedis pulses are 2+ on the right side and 2+ on the left side.        Posterior " tibial pulses are 2+ on the right side and 2+ on the left side.      Heart sounds: Normal heart sounds. No murmur heard.    No gallop.   Pulmonary:      Effort: Pulmonary effort is normal. No respiratory distress.      Breath sounds: Normal breath sounds. No wheezing, rhonchi or rales.   Abdominal:      General: There is no distension.      Palpations: Abdomen is soft.      Tenderness: There is no abdominal tenderness.   Musculoskeletal:         General: No swelling, deformity or signs of injury. Normal range of motion.      Right shoulder: Tenderness present. Normal strength.      Cervical back: Normal range of motion.   Feet:      Right foot:      Protective Sensation: 10 sites tested. 10 sites sensed.      Left foot:      Protective Sensation: 10 sites tested. 10 sites sensed.   Skin:     General: Skin is warm and dry.      Capillary Refill: Capillary refill takes less than 2 seconds.      Coloration: Skin is not jaundiced or pale.   Neurological:      General: No focal deficit present.      Mental Status: He is alert and oriented to person, place, and time.   Psychiatric:         Mood and Affect: Mood normal.         Behavior: Behavior normal.           Diabetes Management Status    Statin: Taking  ACE/ARB: Not taking    Screening or Prevention Patient's value Goal Complete/Controlled?   HgA1C Testing and Control   Lab Results   Component Value Date    HGBA1C 6.6 (H) 03/28/2022      Annually/Less than 8% Yes   Lipid profile : 03/28/2022 Annually Yes   LDL control Lab Results   Component Value Date    LDLCALC 65.2 03/28/2022    Annually/Less than 100 mg/dl  Yes   Nephropathy screening Lab Results   Component Value Date    LABMICR 9.0 03/28/2022     No results found for: PROTEINUA Annually Yes   Blood pressure BP Readings from Last 1 Encounters:   04/04/22 114/73    Less than 140/90 Yes   Dilated retinal exam : 02/22/2021 Annually No   Foot exam   : 04/04/2022 Annually Yes       Assessment:      ICD-10-CM ICD-9-CM    1. Controlled type 2 diabetes mellitus without complication, without long-term current use of insulin  E11.9 250.00   2. Essential hypertension  I10 401.9   3. Mixed hyperlipidemia  E78.2 272.2   4. History of prostate cancer s/p prostatectomy (09/24/2009)  Z85.46 V10.46   5. Tendinitis of right rotator cuff  M75.81 726.10         Plan:    Retrieve records of diabetic eye exam.   Get shingles vaccination at your pharmacy.  Refer to urology to check PSA. Hx of prostate cancer s/p prostatectomy (09/24/2009).   Refer to physical therapy for right rotator cuff tendinitis.   See labs below.   Doing well continue current plan follow-up is scheduled in 6 months  Orders Placed This Encounter   Procedures    Hemoglobin A1C    Comprehensive Metabolic Panel    Lipid Panel    Ambulatory referral/consult to Urology       Current Outpatient Medications   Medication Sig Dispense Refill    amLODIPine (NORVASC) 5 MG tablet TAKE 1 TABLET BY MOUTH EVERY DAY 90 tablet 3    blood sugar diagnostic (TRUETRACK TEST) Strp USE TO TEST 3 TIMES DAILY 300 strip 2    co-enzyme Q-10 30 mg capsule Take 30 mg by mouth 3 (three) times daily.      lancets Misc Use lancets to obtain glucose blood sample 300 each 2    lisinopriL (PRINIVIL,ZESTRIL) 20 MG tablet TAKE 1 TABLET BY MOUTH EVERY DAY IN THE EVENING 90 tablet 1    metFORMIN (GLUCOPHAGE) 500 MG tablet TAKE 1 TABLET (500 MG TOTAL) BY MOUTH 2 (TWO) TIMES DAILY. TAKE WITH FOOD. 180 tablet 2    multivitamin capsule Take 1 capsule by mouth once daily.      omega-3 fatty acids 1,000 mg Cap Take by mouth. 6 Capsule Oral Every day      simvastatin (ZOCOR) 20 MG tablet TAKE 1 TABLET BY MOUTH EVERY DAY 90 tablet 3    TRUE METRIX GLUCOSE METER Misc TEST THREE TIMES A DAY      sildenafiL (VIAGRA) 100 MG tablet Take by mouth. 1 Tablet Oral Every day       No current facility-administered medications for this visit.       There are no discontinued medications.    Follow up in about 6 months  (around 10/4/2022).      Teagan Hernandez MD  Scribe Attestation:   I, Isabelle Villarreal, am scribing for, and in the presence of, Dr.Arif Hernandez I performed the above scribed service and the documentation accurately describes the services I performed. I attest to the accuracy of the note.    I, Dr. Teagan Hernandez, reviewed documentation as scribed above. I performed the services described in this documentation.  I agree that the record reflects my personal performance and is accurate and complete. Teagan Hernandez MD.  10/04/2021                         Declines

## 2023-11-19 NOTE — H&P ADULT. - PROBLEM SELECTOR PROBLEM 1
negative normal/regular rate and rhythm/S1 S2 present/no gallops/no rub/no murmur Diverticulitis of large intestine with abscess without bleeding
